# Patient Record
Sex: MALE | Race: BLACK OR AFRICAN AMERICAN | NOT HISPANIC OR LATINO | Employment: UNEMPLOYED | URBAN - METROPOLITAN AREA
[De-identification: names, ages, dates, MRNs, and addresses within clinical notes are randomized per-mention and may not be internally consistent; named-entity substitution may affect disease eponyms.]

---

## 2017-04-12 ENCOUNTER — OFFICE VISIT (OUTPATIENT)
Dept: INTERNAL MEDICINE | Facility: CLINIC | Age: 32
End: 2017-04-12
Payer: COMMERCIAL

## 2017-04-12 VITALS
RESPIRATION RATE: 16 BRPM | SYSTOLIC BLOOD PRESSURE: 118 MMHG | DIASTOLIC BLOOD PRESSURE: 84 MMHG | BODY MASS INDEX: 27.71 KG/M2 | TEMPERATURE: 99 F | HEART RATE: 86 BPM | HEIGHT: 67 IN | WEIGHT: 176.56 LBS

## 2017-04-12 DIAGNOSIS — K52.9 AGE (ACUTE GASTROENTERITIS): Primary | ICD-10-CM

## 2017-04-12 PROCEDURE — 99999 PR PBB SHADOW E&M-NEW PATIENT-LVL III: CPT | Mod: PBBFAC,,, | Performed by: FAMILY MEDICINE

## 2017-04-12 PROCEDURE — 99203 OFFICE O/P NEW LOW 30 MIN: CPT | Mod: S$GLB,,, | Performed by: FAMILY MEDICINE

## 2017-04-12 PROCEDURE — 1160F RVW MEDS BY RX/DR IN RCRD: CPT | Mod: S$GLB,,, | Performed by: FAMILY MEDICINE

## 2017-04-12 RX ORDER — ONDANSETRON 8 MG/1
8 TABLET, ORALLY DISINTEGRATING ORAL EVERY 6 HOURS PRN
Qty: 30 TABLET | Refills: 0 | Status: SHIPPED | OUTPATIENT
Start: 2017-04-12 | End: 2017-08-30

## 2017-04-12 NOTE — PROGRESS NOTES
Subjective:   Patient ID: Varinder Salazar is a 31 y.o. male.    Chief Complaint: Generalized Body Aches; Chills; Emesis (vomiting on Monday); and Letter for School/Work (return to work note)      Emesis    This is a new problem. The current episode started yesterday. The problem occurs 2 to 4 times per day. The problem has been gradually improving. The emesis has an appearance of stomach contents. There has been no fever. Associated symptoms include abdominal pain, chills, diarrhea, a fever, headaches, myalgias and sweats. Pertinent negatives include no arthralgias, chest pain, coughing, dizziness, URI or weight loss. Risk factors include ill contacts. He has tried nothing for the symptoms. The treatment provided no relief.         PAST MEDICAL HISTORY:  Past Medical History:   Diagnosis Date    Asthma     Depression        PAST SURGICAL HISTORY:  History reviewed. No pertinent surgical history.    SOCIAL HISTORY:  Social History     Social History    Marital status: Single     Spouse name: N/A    Number of children: N/A    Years of education: N/A     Occupational History    Not on file.     Social History Main Topics    Smoking status: Former Smoker     Quit date: 4/5/2017    Smokeless tobacco: Not on file    Alcohol use Yes      Comment: social     Drug use: Yes     Special: Marijuana    Sexual activity: Yes     Partners: Female     Other Topics Concern    Not on file     Social History Narrative       FAMILY HISTORY:  Family History   Problem Relation Age of Onset    Migraines Mother     Diabetes Father     Heart disease Father        ALLERGIES AND MEDICATIONS: updated and reviewed.  Review of patient's allergies indicates:  No Known Allergies    Current Outpatient Prescriptions:     ondansetron (ZOFRAN-ODT) 8 MG TbDL, Take 1 tablet (8 mg total) by mouth every 6 (six) hours as needed., Disp: 30 tablet, Rfl: 0    Review of Systems   Constitutional: Positive for chills and fever. Negative for  "weight loss.   Respiratory: Negative for cough and wheezing.    Cardiovascular: Negative for chest pain and palpitations.   Gastrointestinal: Positive for abdominal pain, diarrhea, nausea and vomiting. Negative for abdominal distention, anal bleeding, blood in stool, constipation and rectal pain.   Musculoskeletal: Positive for myalgias. Negative for arthralgias.   Neurological: Positive for headaches. Negative for dizziness.       Objective:     Vitals:    04/12/17 1049   BP: 118/84   Pulse: 86   Resp: 16   Temp: 98.6 °F (37 °C)   TempSrc: Oral   Weight: 80.1 kg (176 lb 9.4 oz)   Height: 5' 7" (1.702 m)   PainSc:   6   PainLoc: Generalized     Body mass index is 27.66 kg/(m^2).    Physical Exam   Constitutional: He appears well-developed and well-nourished.   HENT:   Head: Normocephalic and atraumatic.   Right Ear: Hearing, tympanic membrane, external ear and ear canal normal. No drainage or swelling. No decreased hearing is noted.   Left Ear: Hearing, tympanic membrane, external ear and ear canal normal. No drainage or swelling. No decreased hearing is noted.   Nose: Nose normal. No rhinorrhea.   Mouth/Throat: Oropharynx is clear and moist. No oropharyngeal exudate, posterior oropharyngeal edema or posterior oropharyngeal erythema.   Eyes: Conjunctivae, EOM and lids are normal. Pupils are equal, round, and reactive to light. Right eye exhibits no discharge and no exudate. Left eye exhibits no discharge and no exudate. Right conjunctiva is not injected. Left conjunctiva is not injected.   Neck: Trachea normal and full passive range of motion without pain. Normal carotid pulses, no hepatojugular reflux and no JVD present. Carotid bruit is not present. No rigidity. No edema and no erythema present. No thyroid mass and no thyromegaly present.   Cardiovascular: Normal rate, regular rhythm and normal heart sounds.    Pulmonary/Chest: Effort normal. No respiratory distress.   Abdominal: Soft. Normal appearance and bowel " sounds are normal. There is no tenderness. There is negative Stephen's sign.   Lymphadenopathy:     He has no cervical adenopathy.   Neurological: He is alert.   Skin: Skin is warm and dry.   Psychiatric: He has a normal mood and affect. His speech is normal and behavior is normal.       Assessment and Plan:   Varinder was seen today for generalized body aches, chills, emesis and letter for school/work.    Diagnoses and all orders for this visit:    AGE (acute gastroenteritis)    Other orders  -     ondansetron (ZOFRAN-ODT) 8 MG TbDL; Take 1 tablet (8 mg total) by mouth every 6 (six) hours as needed.    Hydrate, rest, push po liquids. No immodium for 48-72 hrs. ER if not better tonight.    Return in about 1 week (around 4/19/2017).    Pt has been given instructions populated from [employer] patient information database and has verbalized understanding of the after-visit summary (AVS) and information contained therein.    THIS NOTE WILL BE SHARED WITH THE PATIENT.

## 2017-04-13 NOTE — PATIENT INSTRUCTIONS

## 2017-08-30 ENCOUNTER — OFFICE VISIT (OUTPATIENT)
Dept: INTERNAL MEDICINE | Facility: CLINIC | Age: 32
End: 2017-08-30
Payer: COMMERCIAL

## 2017-08-30 VITALS
HEART RATE: 76 BPM | RESPIRATION RATE: 16 BRPM | TEMPERATURE: 99 F | DIASTOLIC BLOOD PRESSURE: 86 MMHG | SYSTOLIC BLOOD PRESSURE: 132 MMHG | HEIGHT: 67 IN | WEIGHT: 185.88 LBS | BODY MASS INDEX: 29.17 KG/M2

## 2017-08-30 DIAGNOSIS — B96.89 ACUTE BACTERIAL PHARYNGITIS: ICD-10-CM

## 2017-08-30 DIAGNOSIS — N45.1 EPIDIDYMITIS: Primary | ICD-10-CM

## 2017-08-30 DIAGNOSIS — J02.8 ACUTE BACTERIAL PHARYNGITIS: ICD-10-CM

## 2017-08-30 PROCEDURE — 3008F BODY MASS INDEX DOCD: CPT | Mod: S$GLB,,, | Performed by: FAMILY MEDICINE

## 2017-08-30 PROCEDURE — 99999 PR PBB SHADOW E&M-EST. PATIENT-LVL III: CPT | Mod: PBBFAC,,, | Performed by: FAMILY MEDICINE

## 2017-08-30 PROCEDURE — 99214 OFFICE O/P EST MOD 30 MIN: CPT | Mod: S$GLB,,, | Performed by: FAMILY MEDICINE

## 2017-08-30 RX ORDER — METHYLPREDNISOLONE 4 MG/1
TABLET ORAL
Qty: 1 PACKAGE | Refills: 0 | Status: SHIPPED | OUTPATIENT
Start: 2017-08-30 | End: 2017-09-19

## 2017-08-30 RX ORDER — CIPROFLOXACIN 500 MG/1
500 TABLET ORAL EVERY 12 HOURS
Qty: 20 TABLET | Refills: 0 | Status: SHIPPED | OUTPATIENT
Start: 2017-08-30 | End: 2017-09-19

## 2017-08-30 NOTE — PROGRESS NOTES
Subjective:   Patient ID: Varinder Salazar is a 31 y.o. male.    Chief Complaint: Sore Throat and Back Pain      Sore Throat    This is a new problem. The current episode started 1 to 4 weeks ago. The problem has been rapidly worsening. Neither side of throat is experiencing more pain than the other. The maximum temperature recorded prior to his arrival was 100.4 - 100.9 F. The fever has been present for 1 to 2 days. The pain is at a severity of 9/10. Associated symptoms include ear pain, headaches, a hoarse voice, a plugged ear sensation, neck pain, swollen glands and trouble swallowing. Pertinent negatives include no abdominal pain, congestion, coughing, diarrhea, drooling, ear discharge, shortness of breath, stridor or vomiting. He has had exposure to strep. He has had no exposure to mono. He has tried nothing for the symptoms. The treatment provided no relief.   He is also having some symptoms cw past epididymitis but denies dysuria or fever or chills. Having some left sided lower back pain as he did with prior episode.    Patient queried and denies any further complaints        PAST MEDICAL HISTORY:  Past Medical History:   Diagnosis Date    Asthma     Depression        PAST SURGICAL HISTORY:  History reviewed. No pertinent surgical history.    SOCIAL HISTORY:  Social History     Social History    Marital status: Single     Spouse name: N/A    Number of children: N/A    Years of education: N/A     Occupational History    Not on file.     Social History Main Topics    Smoking status: Former Smoker     Quit date: 4/5/2017    Smokeless tobacco: Not on file    Alcohol use Yes      Comment: social     Drug use:      Types: Marijuana    Sexual activity: Yes     Partners: Female     Other Topics Concern    Not on file     Social History Narrative    No narrative on file       FAMILY HISTORY:  Family History   Problem Relation Age of Onset    Migraines Mother     Diabetes Father     Heart disease Father  "       ALLERGIES AND MEDICATIONS: updated and reviewed.  Review of patient's allergies indicates:  No Known Allergies    Current Outpatient Prescriptions:     ciprofloxacin HCl (CIPRO) 500 MG tablet, Take 1 tablet (500 mg total) by mouth every 12 (twelve) hours., Disp: 20 tablet, Rfl: 0    methylPREDNISolone (MEDROL DOSEPACK) 4 mg tablet, use as directed, Disp: 1 Package, Rfl: 0    Review of Systems   Constitutional: Positive for fatigue. Negative for chills, diaphoresis and fever.   HENT: Positive for ear pain, hoarse voice, sore throat and trouble swallowing. Negative for congestion, drooling and ear discharge.    Respiratory: Negative for cough, shortness of breath and stridor.    Cardiovascular: Negative for chest pain and palpitations.   Gastrointestinal: Negative for abdominal pain, diarrhea and vomiting.   Genitourinary: Positive for frequency. Negative for decreased urine volume, dysuria, flank pain, penile pain, penile swelling, testicular pain and urgency.   Musculoskeletal: Positive for back pain and neck pain.   Neurological: Positive for headaches.       Objective:     Vitals:    08/30/17 0836   BP: 132/86   Pulse: 76   Resp: 16   Temp: 98.9 °F (37.2 °C)   TempSrc: Oral   Weight: 84.3 kg (185 lb 13.6 oz)   Height: 5' 7" (1.702 m)   PainSc:   7     Body mass index is 29.11 kg/m².    Physical Exam   Constitutional: He is oriented to person, place, and time. He appears well-developed and well-nourished. He is cooperative. He does not have a sickly appearance. No distress.   HENT:   Head: Normocephalic and atraumatic.   Right Ear: Hearing, tympanic membrane, external ear and ear canal normal. No drainage, swelling or tenderness. No decreased hearing is noted.   Left Ear: Hearing, tympanic membrane, external ear and ear canal normal. No drainage, swelling or tenderness. No decreased hearing is noted.   Nose: Nose normal. No rhinorrhea.   Mouth/Throat: Oropharyngeal exudate, posterior oropharyngeal edema " and posterior oropharyngeal erythema present.   Eyes: Conjunctivae, EOM and lids are normal. Pupils are equal, round, and reactive to light. Right eye exhibits no discharge and no exudate. Left eye exhibits no discharge and no exudate. Right conjunctiva is not injected. Left conjunctiva is not injected. No scleral icterus. Right eye exhibits normal extraocular motion. Left eye exhibits normal extraocular motion.   Neck: Trachea normal, normal range of motion and full passive range of motion without pain. Neck supple. Normal carotid pulses, no hepatojugular reflux and no JVD present. Carotid bruit is not present. No neck rigidity. No tracheal deviation, no edema and no erythema present. No thyroid mass and no thyromegaly present.   Cardiovascular: Normal rate, regular rhythm, normal heart sounds and normal pulses.  Exam reveals no friction rub.    No murmur heard.  Pulmonary/Chest: Effort normal and breath sounds normal. No accessory muscle usage. No respiratory distress. He has no wheezes. He has no rhonchi. He has no rales.   Abdominal: Soft. Normal appearance and bowel sounds are normal. He exhibits no distension, no abdominal bruit, no pulsatile midline mass and no mass. There is no hepatosplenomegaly. There is no tenderness. There is no rebound, no guarding, no CVA tenderness, no tenderness at McBurney's point and negative Stephen's sign.   Musculoskeletal: He exhibits no edema.   Lymphadenopathy:        Head (right side): No submandibular, no preauricular and no posterior auricular adenopathy present.        Head (left side): No submandibular, no preauricular and no posterior auricular adenopathy present.     He has no cervical adenopathy.   Neurological: He is alert and oriented to person, place, and time. GCS eye subscore is 4. GCS verbal subscore is 5. GCS motor subscore is 6.   Skin: Skin is warm and dry. No ecchymosis and no rash noted. Rash is not maculopapular and not urticarial. He is not diaphoretic. No  cyanosis or erythema. Nails show no clubbing.   Psychiatric: He has a normal mood and affect. His speech is normal and behavior is normal. Thought content normal. His mood appears not anxious. His affect is not angry and not inappropriate. He does not exhibit a depressed mood.       Assessment and Plan:   Varinder was seen today for sore throat and back pain.    Diagnoses and all orders for this visit:    Epididymitis    Acute bacterial pharyngitis    Other orders  -     methylPREDNISolone (MEDROL DOSEPACK) 4 mg tablet; use as directed  -     ciprofloxacin HCl (CIPRO) 500 MG tablet; Take 1 tablet (500 mg total) by mouth every 12 (twelve) hours.      Hydrate, rest, OTC Mucinex as directed, Nasal saline as needed.  OTC Zyrtec as directed.    Return in about 1 week (around 9/6/2017), or if symptoms worsen or fail to improve.        THIS NOTE WILL BE SHARED WITH THE PATIENT.

## 2017-09-19 ENCOUNTER — OFFICE VISIT (OUTPATIENT)
Dept: INTERNAL MEDICINE | Facility: CLINIC | Age: 32
End: 2017-09-19
Payer: COMMERCIAL

## 2017-09-19 VITALS
HEIGHT: 67 IN | DIASTOLIC BLOOD PRESSURE: 81 MMHG | BODY MASS INDEX: 28.93 KG/M2 | TEMPERATURE: 99 F | SYSTOLIC BLOOD PRESSURE: 118 MMHG | HEART RATE: 75 BPM | RESPIRATION RATE: 16 BRPM | WEIGHT: 184.31 LBS

## 2017-09-19 DIAGNOSIS — K52.9 AGE (ACUTE GASTROENTERITIS): Primary | ICD-10-CM

## 2017-09-19 PROCEDURE — 99213 OFFICE O/P EST LOW 20 MIN: CPT | Mod: S$GLB,,, | Performed by: FAMILY MEDICINE

## 2017-09-19 PROCEDURE — 3008F BODY MASS INDEX DOCD: CPT | Mod: S$GLB,,, | Performed by: FAMILY MEDICINE

## 2017-09-19 PROCEDURE — 99999 PR PBB SHADOW E&M-EST. PATIENT-LVL III: CPT | Mod: PBBFAC,,, | Performed by: FAMILY MEDICINE

## 2017-09-19 RX ORDER — ONDANSETRON 8 MG/1
8 TABLET, ORALLY DISINTEGRATING ORAL EVERY 6 HOURS PRN
Qty: 30 TABLET | Refills: 0 | Status: SHIPPED | OUTPATIENT
Start: 2017-09-19 | End: 2020-01-27

## 2017-09-20 NOTE — PROGRESS NOTES
"Subjective:   Patient ID: Varinder Salazar is a 31 y.o. male.    Chief Complaint: Sore Throat and Generalized Body Aches      HPI  32 yo male with NVD for two days. Nausea is improving. Diarrhea is improving. No hematemesis. No melena or BRBPR. No fevers or chills. Mild sore throat. Hydrating well now.    Patient queried and denies any further complaints.        ALLERGIES AND MEDICATIONS: updated and reviewed.  Review of patient's allergies indicates:  No Known Allergies    Current Outpatient Prescriptions:     ondansetron (ZOFRAN-ODT) 8 MG TbDL, Take 1 tablet (8 mg total) by mouth every 6 (six) hours as needed., Disp: 30 tablet, Rfl: 0    Review of Systems   Constitutional: Negative for activity change, appetite change, chills, diaphoresis, fatigue, fever and unexpected weight change.   HENT: Positive for sore throat. Negative for congestion, ear discharge, ear pain, postnasal drip, rhinorrhea and sneezing.    Eyes: Negative for photophobia and discharge.   Respiratory: Negative for cough, chest tightness, shortness of breath and wheezing.    Cardiovascular: Negative for chest pain and palpitations.   Gastrointestinal: Positive for diarrhea, nausea and vomiting. Negative for abdominal distention and abdominal pain.   Genitourinary: Negative for dysuria.   Musculoskeletal: Negative for arthralgias and neck pain.   Skin: Negative for rash.   Neurological: Negative for headaches.       Objective:     Vitals:    09/19/17 1135   BP: 118/81   Pulse: 75   Resp: 16   Temp: 98.7 °F (37.1 °C)   TempSrc: Oral   Weight: 83.6 kg (184 lb 4.9 oz)   Height: 5' 7" (1.702 m)   PainSc:   7   PainLoc: Throat     Body mass index is 28.87 kg/m².    Physical Exam   Constitutional: He appears well-developed and well-nourished.   HENT:   Head: Normocephalic and atraumatic.   Right Ear: Hearing, tympanic membrane, external ear and ear canal normal. No drainage or swelling. No decreased hearing is noted.   Left Ear: Hearing, tympanic " membrane, external ear and ear canal normal. No drainage or swelling. No decreased hearing is noted.   Nose: Nose normal. No rhinorrhea.   Mouth/Throat: Posterior oropharyngeal erythema present. No oropharyngeal exudate, posterior oropharyngeal edema or tonsillar abscesses.   Eyes: Conjunctivae, EOM and lids are normal. Pupils are equal, round, and reactive to light. Right eye exhibits no discharge and no exudate. Left eye exhibits no discharge and no exudate. Right conjunctiva is not injected. Left conjunctiva is not injected.   Neck: Trachea normal and full passive range of motion without pain. Normal carotid pulses, no hepatojugular reflux and no JVD present. Carotid bruit is not present. No neck rigidity. No edema and no erythema present. No thyroid mass and no thyromegaly present.   Cardiovascular: Normal rate, regular rhythm and normal heart sounds.    Pulmonary/Chest: Effort normal. No respiratory distress.   Abdominal: Soft. Normal appearance and bowel sounds are normal. There is no tenderness. There is negative Stephen's sign.   Lymphadenopathy:     He has no cervical adenopathy.   Neurological: He is alert.   Skin: Skin is warm and dry.   Psychiatric: He has a normal mood and affect. His speech is normal and behavior is normal.       Assessment and Plan:   Varinder was seen today for sore throat and generalized body aches.    Diagnoses and all orders for this visit:    AGE (acute gastroenteritis)    Other orders  -     ondansetron (ZOFRAN-ODT) 8 MG TbDL; Take 1 tablet (8 mg total) by mouth every 6 (six) hours as needed.      Gastroenteritis--Hydrate with clear liquids. (Dilute Powerade, Gatorade, water, flat Sprite). No carbonation (increases nausea). When hungry, slowly advance diet to full liquids and then to cold, bland diet. Zofran Rx as directed. If emesis starts and can't be resolved with Zofran, go to ER.   If diarrhea starts, I recommend good, soft toilet paper or baby wipes. Consider using Desitin or  Erik's Butt Paste. Wash hands thoroughly. I don't especially recommend Imodium or Pepto-Bismol; let the diarrhea take its course. You will not become dehydrated as long as we stop the vomiting.     No Follow-up on file.    THIS NOTE WILL BE SHARED WITH THE PATIENT.

## 2017-09-21 ENCOUNTER — TELEPHONE (OUTPATIENT)
Dept: INTERNAL MEDICINE | Facility: CLINIC | Age: 32
End: 2017-09-21

## 2017-09-21 NOTE — TELEPHONE ENCOUNTER
----- Message from Amanda Hodge sent at 9/21/2017  8:09 AM CDT -----  Contact: [phone]  Patient would like to know if he can get a new Dr note to return to work. Patient was told to call if needed he tried to return to work but was sent home.

## 2017-09-21 NOTE — TELEPHONE ENCOUNTER
Pt was seen Tuesday, missed Wednesday  and  Thursday and wants to return tomorrow Friday 9-22    Pt informed new wk note is ready for him to .

## 2017-12-13 ENCOUNTER — OFFICE VISIT (OUTPATIENT)
Dept: INTERNAL MEDICINE | Facility: CLINIC | Age: 32
End: 2017-12-13
Payer: COMMERCIAL

## 2017-12-13 VITALS
WEIGHT: 183.44 LBS | DIASTOLIC BLOOD PRESSURE: 80 MMHG | BODY MASS INDEX: 28.79 KG/M2 | HEART RATE: 68 BPM | SYSTOLIC BLOOD PRESSURE: 130 MMHG | TEMPERATURE: 98 F | HEIGHT: 67 IN

## 2017-12-13 DIAGNOSIS — J11.1 INFLUENZA: Primary | ICD-10-CM

## 2017-12-13 PROCEDURE — 99999 PR PBB SHADOW E&M-EST. PATIENT-LVL III: CPT | Mod: PBBFAC,,, | Performed by: FAMILY MEDICINE

## 2017-12-13 PROCEDURE — 99213 OFFICE O/P EST LOW 20 MIN: CPT | Mod: S$GLB,,, | Performed by: FAMILY MEDICINE

## 2017-12-13 RX ORDER — PROMETHAZINE HYDROCHLORIDE AND CODEINE PHOSPHATE 6.25; 1 MG/5ML; MG/5ML
5 SOLUTION ORAL EVERY 4 HOURS PRN
Qty: 240 ML | Refills: 0 | Status: SHIPPED | OUTPATIENT
Start: 2017-12-13 | End: 2017-12-23

## 2017-12-13 RX ORDER — OSELTAMIVIR PHOSPHATE 75 MG/1
75 CAPSULE ORAL 2 TIMES DAILY
Qty: 10 CAPSULE | Refills: 0 | Status: SHIPPED | OUTPATIENT
Start: 2017-12-13 | End: 2017-12-18

## 2017-12-13 NOTE — PROGRESS NOTES
Subjective:   Patient ID: Varinder Salazar is a 32 y.o. male.    Chief Complaint: Sore Throat; Cough (dry); Nasal Congestion; and Generalized Body Aches      Cough   This is a new problem. The current episode started yesterday. The problem has been rapidly worsening. The problem occurs every few minutes. The cough is non-productive. Associated symptoms include chest pain, chills, ear congestion, ear pain, a fever, headaches, myalgias, postnasal drip, rhinorrhea, a sore throat and sweats. Pertinent negatives include no hemoptysis, rash, shortness of breath, weight loss or wheezing. The symptoms are aggravated by dust, exercise and fumes. He has tried nothing for the symptoms. The treatment provided no relief. There is no history of asthma or bronchiectasis.       Patient queried and denies any further complaints.        ALLERGIES AND MEDICATIONS: updated and reviewed.  Review of patient's allergies indicates:  No Known Allergies    Current Outpatient Prescriptions:     ondansetron (ZOFRAN-ODT) 8 MG TbDL, Take 1 tablet (8 mg total) by mouth every 6 (six) hours as needed., Disp: 30 tablet, Rfl: 0    oseltamivir (TAMIFLU) 75 MG capsule, Take 1 capsule (75 mg total) by mouth 2 (two) times daily., Disp: 10 capsule, Rfl: 0    promethazine-codeine 6.25-10 mg/5 ml (PHENERGAN WITH CODEINE) 6.25-10 mg/5 mL syrup, Take 5 mLs by mouth every 4 (four) hours as needed., Disp: 240 mL, Rfl: 0    Review of Systems   Constitutional: Positive for chills and fever. Negative for weight loss.   HENT: Positive for ear pain, postnasal drip, rhinorrhea and sore throat.    Respiratory: Positive for cough. Negative for hemoptysis, shortness of breath and wheezing.    Cardiovascular: Positive for chest pain. Negative for palpitations.   Musculoskeletal: Positive for myalgias.   Skin: Negative for rash.   Neurological: Positive for headaches.       Objective:     Vitals:    12/13/17 1058   BP: 130/80   Pulse: 68   Temp: 98.3 °F (36.8 °C)  "  TempSrc: Oral   Weight: 83.2 kg (183 lb 6.8 oz)   Height: 5' 7" (1.702 m)   PainSc:   4     Body mass index is 28.73 kg/m².    Physical Exam   Constitutional: He appears well-developed and well-nourished.   HENT:   Head: Normocephalic and atraumatic.   Right Ear: Hearing, tympanic membrane, external ear and ear canal normal. No drainage or swelling. No decreased hearing is noted.   Left Ear: Hearing, tympanic membrane, external ear and ear canal normal. No drainage or swelling. No decreased hearing is noted.   Nose: Nose normal. No rhinorrhea.   Mouth/Throat: Oropharynx is clear and moist. No oropharyngeal exudate, posterior oropharyngeal edema or posterior oropharyngeal erythema.   Eyes: Conjunctivae, EOM and lids are normal. Pupils are equal, round, and reactive to light. Right eye exhibits no discharge and no exudate. Left eye exhibits no discharge and no exudate. Right conjunctiva is not injected. Left conjunctiva is not injected.   Neck: Trachea normal and full passive range of motion without pain. Normal carotid pulses, no hepatojugular reflux and no JVD present. Carotid bruit is not present. No neck rigidity. No edema and no erythema present. No thyroid mass and no thyromegaly present.   Cardiovascular: Normal rate, regular rhythm and normal heart sounds.    Pulmonary/Chest: Effort normal. No respiratory distress.   Abdominal: Soft. Normal appearance and bowel sounds are normal. There is no tenderness. There is negative Stephen's sign.   Lymphadenopathy:     He has no cervical adenopathy.   Neurological: He is alert.   Skin: Skin is warm and dry.   Psychiatric: He has a normal mood and affect. His speech is normal and behavior is normal.       Assessment and Plan:   Varinder was seen today for sore throat, cough, nasal congestion and generalized body aches.    Diagnoses and all orders for this visit:    Influenza    Other orders  -     oseltamivir (TAMIFLU) 75 MG capsule; Take 1 capsule (75 mg total) by mouth 2 " (two) times daily.  -     promethazine-codeine 6.25-10 mg/5 ml (PHENERGAN WITH CODEINE) 6.25-10 mg/5 mL syrup; Take 5 mLs by mouth every 4 (four) hours as needed.    Hydrate, rest, OTC Mucinex as directed, Nasal saline as needed.  OTC Zyrtec as directed.      Return in about 1 week (around 12/20/2017).    THIS NOTE WILL BE SHARED WITH THE PATIENT.

## 2018-02-01 ENCOUNTER — TELEPHONE (OUTPATIENT)
Dept: INTERNAL MEDICINE | Facility: CLINIC | Age: 33
End: 2018-02-01

## 2018-02-01 ENCOUNTER — OFFICE VISIT (OUTPATIENT)
Dept: INTERNAL MEDICINE | Facility: CLINIC | Age: 33
End: 2018-02-01
Payer: COMMERCIAL

## 2018-02-01 ENCOUNTER — HOSPITAL ENCOUNTER (OUTPATIENT)
Dept: RADIOLOGY | Facility: HOSPITAL | Age: 33
Discharge: HOME | End: 2018-02-01
Attending: FAMILY MEDICINE
Payer: COMMERCIAL

## 2018-02-01 VITALS
TEMPERATURE: 98 F | DIASTOLIC BLOOD PRESSURE: 90 MMHG | BODY MASS INDEX: 28.09 KG/M2 | OXYGEN SATURATION: 99 % | SYSTOLIC BLOOD PRESSURE: 140 MMHG | HEART RATE: 89 BPM | WEIGHT: 179 LBS | HEIGHT: 67 IN

## 2018-02-01 DIAGNOSIS — M54.6 ACUTE LEFT-SIDED THORACIC BACK PAIN: ICD-10-CM
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
## 2018-02-01 DIAGNOSIS — M54.50 ACUTE LEFT-SIDED LOW BACK PAIN WITHOUT SCIATICA: ICD-10-CM

## 2018-02-01 DIAGNOSIS — M25.512 ACUTE PAIN OF LEFT SHOULDER: Primary | ICD-10-CM

## 2018-02-01 DIAGNOSIS — M25.512 ACUTE PAIN OF LEFT SHOULDER: ICD-10-CM

## 2018-02-01 PROCEDURE — 72114 X-RAY EXAM L-S SPINE BENDING: CPT | Mod: TC,PO

## 2018-02-01 PROCEDURE — 73030 X-RAY EXAM OF SHOULDER: CPT | Mod: 26,LT,, | Performed by: RADIOLOGY

## 2018-02-01 PROCEDURE — 99214 OFFICE O/P EST MOD 30 MIN: CPT | Mod: S$GLB,,, | Performed by: FAMILY MEDICINE

## 2018-02-01 PROCEDURE — 72114 X-RAY EXAM L-S SPINE BENDING: CPT | Mod: 26,,, | Performed by: RADIOLOGY

## 2018-02-01 PROCEDURE — 73030 X-RAY EXAM OF SHOULDER: CPT | Mod: TC,PO,LT

## 2018-02-01 PROCEDURE — 3008F BODY MASS INDEX DOCD: CPT | Mod: S$GLB,,, | Performed by: FAMILY MEDICINE

## 2018-02-01 PROCEDURE — 99999 PR PBB SHADOW E&M-EST. PATIENT-LVL III: CPT | Mod: PBBFAC,,, | Performed by: FAMILY MEDICINE

## 2018-02-01 PROCEDURE — 72070 X-RAY EXAM THORAC SPINE 2VWS: CPT | Mod: TC,PO

## 2018-02-01 PROCEDURE — 72070 X-RAY EXAM THORAC SPINE 2VWS: CPT | Mod: 26,,, | Performed by: RADIOLOGY

## 2018-02-01 RX ORDER — CYCLOBENZAPRINE HCL 10 MG
10 TABLET ORAL NIGHTLY
Qty: 20 TABLET | Refills: 0 | Status: SHIPPED | OUTPATIENT
Start: 2018-02-01 | End: 2018-02-11

## 2018-02-01 RX ORDER — CHLORZOXAZONE 500 MG/1
TABLET ORAL
COMMUNITY
Start: 2018-01-23 | End: 2018-02-01

## 2018-02-01 RX ORDER — METHOCARBAMOL 500 MG/1
500 TABLET, FILM COATED ORAL 3 TIMES DAILY
Qty: 30 TABLET | Refills: 0 | Status: SHIPPED | OUTPATIENT
Start: 2018-02-01 | End: 2018-02-11

## 2018-02-01 RX ORDER — IBUPROFEN 800 MG/1
800 TABLET ORAL 3 TIMES DAILY
Qty: 30 TABLET | Refills: 0 | Status: SHIPPED | OUTPATIENT
Start: 2018-02-01 | End: 2020-01-27
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
## 2018-02-01 NOTE — TELEPHONE ENCOUNTER
----- Message from Jeremi Ruiz MD sent at 2/1/2018 12:37 PM CST -----  Please let pt know his spinal films and shoulder films were normal other than some mild arthritis changes. Thank you.

## 2019-07-23 ENCOUNTER — TELEPHONE (OUTPATIENT)
Dept: PRIMARY CARE CLINIC | Facility: CLINIC | Age: 34
End: 2019-07-23

## 2019-07-23 ENCOUNTER — OFFICE VISIT (OUTPATIENT)
Dept: PRIMARY CARE CLINIC | Facility: CLINIC | Age: 34
End: 2019-07-23
Payer: COMMERCIAL

## 2019-07-23 ENCOUNTER — HOSPITAL ENCOUNTER (OUTPATIENT)
Dept: RADIOLOGY | Facility: HOSPITAL | Age: 34
Discharge: HOME | End: 2019-07-23
Attending: FAMILY MEDICINE
Payer: COMMERCIAL

## 2019-07-23 VITALS
BODY MASS INDEX: 30.07 KG/M2 | DIASTOLIC BLOOD PRESSURE: 78 MMHG | HEIGHT: 67 IN | HEART RATE: 88 BPM | TEMPERATURE: 99 F | WEIGHT: 191.56 LBS | SYSTOLIC BLOOD PRESSURE: 104 MMHG

## 2019-07-23 DIAGNOSIS — M54.42 CHRONIC LEFT-SIDED LOW BACK PAIN WITH LEFT-SIDED SCIATICA: ICD-10-CM

## 2019-07-23 DIAGNOSIS — G89.29 CHRONIC LEFT-SIDED LOW BACK PAIN WITH LEFT-SIDED SCIATICA: ICD-10-CM

## 2019-07-23 DIAGNOSIS — N52.8 OTHER MALE ERECTILE DYSFUNCTION: ICD-10-CM

## 2019-07-23 DIAGNOSIS — L02.811 SCALP ABSCESS: Primary | ICD-10-CM

## 2019-07-23 DIAGNOSIS — Z00.00 GENERAL MEDICAL EXAM: ICD-10-CM

## 2019-07-23 PROCEDURE — 99999 PR PBB SHADOW E&M-EST. PATIENT-LVL III: ICD-10-PCS | Mod: PBBFAC,,, | Performed by: FAMILY MEDICINE

## 2019-07-23 PROCEDURE — 99214 PR OFFICE/OUTPT VISIT, EST, LEVL IV, 30-39 MIN: ICD-10-PCS | Mod: S$GLB,,, | Performed by: FAMILY MEDICINE

## 2019-07-23 PROCEDURE — 99213 OFFICE O/P EST LOW 20 MIN: CPT | Mod: PBBFAC,25,PN | Performed by: FAMILY MEDICINE

## 2019-07-23 PROCEDURE — 72100 X-RAY EXAM L-S SPINE 2/3 VWS: CPT | Mod: 26,,, | Performed by: RADIOLOGY

## 2019-07-23 PROCEDURE — 99999 PR PBB SHADOW E&M-EST. PATIENT-LVL III: CPT | Mod: PBBFAC,,, | Performed by: FAMILY MEDICINE

## 2019-07-23 PROCEDURE — 99214 OFFICE O/P EST MOD 30 MIN: CPT | Mod: S$GLB,,, | Performed by: FAMILY MEDICINE

## 2019-07-23 PROCEDURE — 72100 XR LUMBAR SPINE AP AND LATERAL: ICD-10-PCS | Mod: 26,,, | Performed by: RADIOLOGY

## 2019-07-23 PROCEDURE — 72100 X-RAY EXAM L-S SPINE 2/3 VWS: CPT | Mod: TC,PN

## 2019-07-23 RX ORDER — DICLOFENAC SODIUM 75 MG/1
75 TABLET, DELAYED RELEASE ORAL 2 TIMES DAILY
Qty: 30 TABLET | Refills: 1 | Status: SHIPPED | OUTPATIENT
Start: 2019-07-23 | End: 2020-01-27

## 2019-07-23 RX ORDER — SILDENAFIL 100 MG/1
100 TABLET, FILM COATED ORAL DAILY PRN
Qty: 30 TABLET | Refills: 1 | Status: SHIPPED | OUTPATIENT
Start: 2019-07-23 | End: 2020-01-27

## 2019-07-23 RX ORDER — METHOCARBAMOL 500 MG/1
500 TABLET, FILM COATED ORAL 3 TIMES DAILY
Qty: 30 TABLET | Refills: 0 | Status: SHIPPED | OUTPATIENT
Start: 2019-07-23 | End: 2019-08-02

## 2019-07-24 ENCOUNTER — TELEPHONE (OUTPATIENT)
Dept: PLASTIC SURGERY | Facility: CLINIC | Age: 34
End: 2019-07-24

## 2019-07-24 PROBLEM — G89.29 CHRONIC LEFT-SIDED LOW BACK PAIN WITH LEFT-SIDED SCIATICA: Status: ACTIVE | Noted: 2019-07-24

## 2019-07-24 PROBLEM — N52.8 OTHER MALE ERECTILE DYSFUNCTION: Status: ACTIVE | Noted: 2019-07-24

## 2019-07-24 PROBLEM — M54.42 CHRONIC LEFT-SIDED LOW BACK PAIN WITH LEFT-SIDED SCIATICA: Status: ACTIVE | Noted: 2019-07-24

## 2019-07-24 NOTE — TELEPHONE ENCOUNTER
left message for pt to call me back regarding being scheduled for a consult appt. I left our office number for pt to call back . Pt didnt answer the phone,         ----- Message from Imelda Bhatia RN sent at 7/23/2019  5:32 PM CDT -----  Contact: [phone]/ pt information      ----- Message -----  From: Torrie Miller  Sent: 7/23/2019  11:04 AM  To: Daljit Purcell Staff    Dr. Jeremi Ruiz has put in a referral for patient to be scheduled with Plastic Surgery. Please call patient to schedule.    Scalp abscess [L02.811]

## 2019-07-24 NOTE — TELEPHONE ENCOUNTER
spoke with pt and was able to get him scheduled . Pt verbalized understanding of appt time date and location.             ----- Message from Machelle Vargas sent at 7/24/2019 12:02 PM CDT -----  Contact: Pt.Self   Patient Returning Call from [facility]    Who Left Message for Patient:  Ms.Brittani Al     Communication Preference:  [phone]    Additional Information:    Thank You
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
## 2019-07-26 ENCOUNTER — TELEPHONE (OUTPATIENT)
Dept: PRIMARY CARE CLINIC | Facility: CLINIC | Age: 34
End: 2019-07-26

## 2019-07-26 ENCOUNTER — TELEPHONE (OUTPATIENT)
Dept: PLASTIC SURGERY | Facility: CLINIC | Age: 34
End: 2019-07-26

## 2019-07-26 NOTE — TELEPHONE ENCOUNTER
spoke with pt regarding his upcoming appt. I made pt aware that I could not gurantee his procedure to be approved and that this appt was only a consult & that we did have a procedure day available thurs of week of his scheduled consult appt. Pt asked me what was the latest time for a procedure that Thursday, I made pt aware that if approved , The procedure could possibly be done at 4pm if the slot was available and his insurance approved the procedure. Pt verbalized understanding.               ----- Message from Flo Kumar sent at 7/26/2019 12:09 PM CDT -----  Contact: Pt  Needs Advice    Reason for call:The Pt would like to have a call back from your office about his appt on Tuesday and then also the possible appt for the procedure on Thursday.  Please contact the Pt.         Communication Preference:[phone]    Additional Information:

## 2019-07-26 NOTE — TELEPHONE ENCOUNTER
----- Message from Jeremi Ruiz MD sent at 7/26/2019 11:55 AM CDT -----  Contact: [phone]  gen surgery not likely to do an I&D on his scalp. Other choice is ENT which will be a much longer wait. He will need to wait for plastic appt or seek outside referral. thx  ----- Message -----  From: Jeremi Ruiz MD  Sent: 7/25/2019  12:51 PM  To: Jeremi Ruiz MD        ----- Message -----  From: Kathleen Randhawa  Sent: 7/25/2019  12:38 PM  To: Joseph Blood Staff     The plastic surgeon your recommended to him will not be able to do the surgery until 8/8. He needs this prior to 8/5. Please set this up with the 2nd option you had for him with the [facility].

## 2019-08-06 ENCOUNTER — OFFICE VISIT (OUTPATIENT)
Dept: PLASTIC SURGERY | Facility: CLINIC | Age: 34
End: 2019-08-06
Payer: COMMERCIAL

## 2019-08-06 VITALS — HEART RATE: 76 BPM | DIASTOLIC BLOOD PRESSURE: 82 MMHG | SYSTOLIC BLOOD PRESSURE: 125 MMHG

## 2019-08-06 DIAGNOSIS — R22.0 MASS OF SCALP: ICD-10-CM

## 2019-08-06 DIAGNOSIS — L72.0 EPIDERMAL CYST: Primary | ICD-10-CM

## 2019-08-06 PROCEDURE — 99202 PR OFFICE/OUTPT VISIT, NEW, LEVL II, 15-29 MIN: ICD-10-PCS | Mod: S$GLB,,, | Performed by: SURGERY

## 2019-08-06 PROCEDURE — 99202 OFFICE O/P NEW SF 15 MIN: CPT | Mod: S$GLB,,, | Performed by: SURGERY

## 2019-08-06 RX ORDER — OXYCODONE HYDROCHLORIDE 5 MG/1
5 TABLET ORAL EVERY 4 HOURS PRN
Qty: 5 TABLET | Refills: 0 | Status: SHIPPED | OUTPATIENT
Start: 2019-08-06 | End: 2020-01-27

## 2019-08-06 RX ORDER — DIAZEPAM 5 MG/1
5 TABLET ORAL EVERY 6 HOURS PRN
Qty: 1 TABLET | Refills: 0 | Status: SHIPPED | OUTPATIENT
Start: 2019-08-06 | End: 2019-09-05

## 2019-08-06 NOTE — PROGRESS NOTES
CC: epidermal cyst    HPI: This is a 33 y.o. male with a cyst that has been present for months. Patient states the cyst first appeared in December 2018. The location of the cyst is focal to the left mastoid. Patient reports he tried aspirating it at home, which produced white sebum. The cyst continued to reoccur and grow in size. The area is mildly tender. There are no modifying factors and there are no systemic associated signs and symptoms.     Med/Surg/Accidents:                See ROS                                                   CV: no congenital abn                                                     Pulm: + asthma, no chronic diseases    FH:   History of cancer:  Negative  Bleeding disorders:                         none  MH/anesthetic problems:                 none           Sickle Cell:                                      none  Allergy/Asthma:                              None                                                       Past Medical History:   Diagnosis Date    Asthma     Depression     Lateral epicondylitis 8/23/2013       History reviewed. No pertinent surgical history.      Current Outpatient Medications:     diclofenac (VOLTAREN) 75 MG EC tablet, Take 1 tablet (75 mg total) by mouth 2 (two) times daily. w food and water for 7-10 d, Disp: 30 tablet, Rfl: 1    ibuprofen (ADVIL,MOTRIN) 800 MG tablet, Take 1 tablet (800 mg total) by mouth 3 (three) times daily., Disp: 30 tablet, Rfl: 0    ondansetron (ZOFRAN-ODT) 8 MG TbDL, Take 1 tablet (8 mg total) by mouth every 6 (six) hours as needed., Disp: 30 tablet, Rfl: 0    sildenafil (VIAGRA) 100 MG tablet, Take 1 tablet (100 mg total) by mouth daily as needed. For erectile dysfunction, Disp: 30 tablet, Rfl: 1    Review of patient's allergies indicates:  No Known Allergies    Family History   Problem Relation Age of Onset    Migraines Mother     Diabetes Father     Heart disease Father            ROS  Review of Systems   Constitutional:  Negative for activity change, appetite change and unexpected weight change.   HENT: Negative.  Negative for congestion and facial swelling.    Eyes: Negative.  Negative for discharge, redness and itching.   Respiratory: Negative.  Negative for apnea and shortness of breath.         + asthma    Cardiovascular: Negative.  Negative for chest pain.   Musculoskeletal: Negative.  Negative for arthralgias, gait problem and myalgias.   Skin: Negative.  Negative for color change and pallor.        Cyst: left mastoid x 10 m, increasing in size   Allergic/Immunologic: Negative.    Neurological: Negative.  Negative for dizziness and facial asymmetry.   Psychiatric/Behavioral: Negative.          PE    Physical Exam   Constitutional: He is oriented to person, place, and time. He appears well-developed and well-nourished.   HENT:   Head: Normocephalic and atraumatic. Head is without abrasion, without contusion and without laceration.   Right Ear: External ear normal.   Left Ear: External ear normal.   Nose: Nose normal.   Mouth/Throat: Oropharynx is clear and moist.   Eyes: Pupils are equal, round, and reactive to light. Conjunctivae, EOM and lids are normal.   Neck: Trachea normal, full passive range of motion without pain and phonation normal. Neck supple.   Cardiovascular: Normal rate, regular rhythm and normal heart sounds.   Pulmonary/Chest: Effort normal and breath sounds normal. No stridor. No respiratory distress.   Musculoskeletal: Normal range of motion.   Neurological: He is alert and oriented to person, place, and time.   Skin: Skin is warm, dry and intact. Capillary refill takes less than 2 seconds. No abrasion, no bruising, no burn, no ecchymosis, no laceration, no lesion and no rash noted. No erythema.   On exam, the location of the cyst is focal to the left mastoid process. There is a punctum present.    The cyst is non-mobile, hard There are no modifying factors and there are no systemic associated signs and  symptoms.   Psychiatric: He has a normal mood and affect. His speech is normal and behavior is normal. Judgment and thought content normal. Cognition and memory are normal.   Nursing note and vitals reviewed.       Imaging Studies  Not indicated at this time    Assessment:  Assessment   1. Epidermal cyst     Scalp          Plan  Plan   I feel he is a good candidate for excision of a epidermal cyst and every effort will be made to place the scar in an aesthetically pleasing location. The risks, benefits, and alternatives are reviewed and permission is granted to proceed.  Will submit information for authorization.  Needs booking sheet with CPT codes 39988, 99043.  8/22 for minor date scheduled  He can place orajel over the lesion 1 hour before the procedure and cover it with a tegaderm.  Wrote for pre-procedural valium.  He verbalized understanding that he is to take it 1 hour before scheduled procedure.  He cannot drive or use alcohol while taking this.  He says that his wife will bring him in to the appointment.  I explained to him that I would use local anesthesia injected around the area prior to excision.  We came up with the plan of oral sedation and topical premedication as outlined above to address his concerns.      30 minutes was spend with patient, more than 50% was spent explaining the nature of the procedure, perioperative anesthesia or answering questions, expected recovery.         Plastic & Reconstructive Surgery  [facility]  c/o Binh Bocanegra M.D.  [facility]  [address]  [address] [place] [address]    Work [phone]  Toll free [phone]  If no answer [phone]

## 2019-08-06 NOTE — LETTER
August 6, 2019      Jeremi Ruiz MD  [address]  [facility] [address]           [facility] [place] [address] [id number]  [address] [address]  Phone: [phone]  Fax: [phone]       [facility] [place] [id number]  [address] [address]  Phone: [phone]  Fax: [phone] August 6, 2019      Jeremi Ruiz MD  [address]  [facility] [address]    Patient: Varinder Salazar   MR Number: [id number]   YOB: 1985   Date of Visit: 8/6/2019     Dear Dr. Jeremi Ruiz:    Thank you for referring Varinder Salazar to me for evaluation. Below you will find relevant portions of my assessment and plan of care.    Assessment:   1. Epidermal cyst Scalp       I feel he is a good candidate for excision of a epidermal cyst and every effort will be made to place the scar in an aesthetically pleasing location. The risks, benefits, and alternatives are reviewed and permission is granted to proceed.    Will submit information for authorization.  Needs booking sheet with CPT codes 83103, 37088.  8/22 for minor procedure date was scheduled. He can place orajel over the lesion 1 hour before the procedure and cover it with a tegaderm.    Wrote for pre-procedural valium.  He verbalized understanding that he is to take it 1 hour before scheduled procedure.  He cannot drive or use alcohol while taking this.  He says that his wife will bring him in to the appointment. I explained to him that I would use local anesthesia injected around the area prior to excision.  We came up with the plan of oral sedation and topical premedication to address his concerns.       30 minutes was spend with patient, more than 50% was spent explaining the nature of the procedure, perioperative anesthesia or answering questions, expected recovery.     If you have questions, please do not hesitate to call me. I look forward to following Varinder Salazar along with  you.    Sincerely,      Binh Bocanegra MD  Section of Plastic & Reconstructive Surgery  Department of Surgery  [facility]    TYRONEK/hcr

## 2019-08-21 ENCOUNTER — TELEPHONE (OUTPATIENT)
Dept: PLASTIC SURGERY | Facility: CLINIC | Age: 34
End: 2019-08-21

## 2019-08-21 RX ORDER — DIAZEPAM 5 MG/1
5 TABLET ORAL EVERY 6 HOURS PRN
Qty: 1 TABLET | Refills: 0 | Status: SHIPPED | OUTPATIENT
Start: 2019-08-21 | End: 2020-01-27

## 2019-08-21 NOTE — TELEPHONE ENCOUNTER
spoke with pt and confirmed that his Rx was sent over and it was to be taken an hour before his procedure. Pt verbalized understanding.                ----- Message from Que Peacock sent at 8/21/2019  9:44 AM CDT -----  Contact: pt  Needs Advice    Reason for call: the pt is calling to speak with the nurse. The pt states he was told he would be getting a prescription called in before his surgery. The pt states he wants to know if he will get the prescription today or tomorrow.     [id number] - [place], [place] - [address] AT [facility] [address]  [facility] [address]  Phone: [phone] Fax: [phone]    Communication Preference: [phone]     Additional Information:

## 2019-08-22 ENCOUNTER — TELEPHONE (OUTPATIENT)
Dept: PLASTIC SURGERY | Facility: CLINIC | Age: 34
End: 2019-08-22

## 2019-08-22 ENCOUNTER — PROCEDURE VISIT (OUTPATIENT)
Dept: PLASTIC SURGERY | Facility: CLINIC | Age: 34
End: 2019-08-22
Payer: COMMERCIAL

## 2019-08-22 VITALS — WEIGHT: 191 LBS | BODY MASS INDEX: 29.91 KG/M2

## 2019-08-22 DIAGNOSIS — R22.0 MASS OF SCALP: Primary | ICD-10-CM

## 2019-08-22 PROCEDURE — 88304 TISSUE SPECIMEN TO PATHOLOGY, SURGERY: ICD-10-PCS | Mod: 26,,, | Performed by: PATHOLOGY

## 2019-08-22 PROCEDURE — 88304 TISSUE EXAM BY PATHOLOGIST: CPT | Mod: 26,,, | Performed by: PATHOLOGY

## 2019-08-22 PROCEDURE — 88304 TISSUE EXAM BY PATHOLOGIST: CPT | Performed by: PATHOLOGY

## 2019-08-22 RX ORDER — OXYCODONE HYDROCHLORIDE 5 MG/1
5 TABLET ORAL EVERY 6 HOURS PRN
Qty: 5 TABLET | Refills: 0 | Status: SHIPPED | OUTPATIENT
Start: 2019-08-22 | End: 2020-01-27

## 2019-08-22 NOTE — TELEPHONE ENCOUNTER
spoke with pt and squared him away for his procedure. Pt verbalized understading.                 ----- Message from Ngoc Vazquez sent at 8/22/2019 12:54 PM CDT -----  Contact: pt @ [phone]  Calling to speak with someone in Dr. Bocanegra's office regarding his procedure today @4pm, asking if he needed to fast. Please call.

## 2019-08-23 ENCOUNTER — OFFICE VISIT (OUTPATIENT)
Dept: URGENT CARE | Facility: CLINIC | Age: 34
End: 2019-08-23
Payer: COMMERCIAL

## 2019-08-23 ENCOUNTER — TELEPHONE (OUTPATIENT)
Dept: PLASTIC SURGERY | Facility: CLINIC | Age: 34
End: 2019-08-23

## 2019-08-23 VITALS
WEIGHT: 191 LBS | OXYGEN SATURATION: 98 % | TEMPERATURE: 98 F | DIASTOLIC BLOOD PRESSURE: 81 MMHG | SYSTOLIC BLOOD PRESSURE: 132 MMHG | HEART RATE: 89 BPM | HEIGHT: 67 IN | BODY MASS INDEX: 29.98 KG/M2 | RESPIRATION RATE: 18 BRPM

## 2019-08-23 DIAGNOSIS — T79.2XXA SEROMA, POST-TRAUMATIC: Primary | ICD-10-CM

## 2019-08-23 DIAGNOSIS — M54.2 NECK PAIN: ICD-10-CM

## 2019-08-23 PROCEDURE — 99203 PR OFFICE/OUTPT VISIT, NEW, LEVL III, 30-44 MIN: ICD-10-PCS | Mod: S$GLB,,, | Performed by: EMERGENCY MEDICINE

## 2019-08-23 PROCEDURE — 99203 OFFICE O/P NEW LOW 30 MIN: CPT | Mod: S$GLB,,, | Performed by: EMERGENCY MEDICINE

## 2019-08-23 RX ORDER — HYDROCODONE BITARTRATE AND ACETAMINOPHEN 7.5; 325 MG/1; MG/1
1 TABLET ORAL EVERY 8 HOURS PRN
Qty: 12 TABLET | Refills: 0 | Status: SHIPPED | OUTPATIENT
Start: 2019-08-23 | End: 2019-08-27

## 2019-08-23 RX ORDER — MUPIROCIN 20 MG/G
OINTMENT TOPICAL
Qty: 22 G | Refills: 1 | Status: SHIPPED | OUTPATIENT
Start: 2019-08-23 | End: 2020-01-27

## 2019-08-23 RX ORDER — SULFAMETHOXAZOLE AND TRIMETHOPRIM 800; 160 MG/1; MG/1
1 TABLET ORAL 2 TIMES DAILY
Qty: 20 TABLET | Refills: 0 | Status: SHIPPED | OUTPATIENT
Start: 2019-08-23 | End: 2019-09-02

## 2019-08-23 NOTE — TELEPHONE ENCOUNTER
spoke with pt and he stated that he was in a little pain since his procedure and he went over to urgent care to be seen and the Dr he saw there squeezed it and blood came out I told pt that was normal,He asked could he get a work letter for Sunday to let him be excused as he wasn't sure what his pain level would be this weekend and he would be able to contact us . I told pt that was fine and that I would contact pt on Monday to touch base with him. He verbalized understanding.

## 2019-08-23 NOTE — PATIENT INSTRUCTIONS
Large amount of serous fluid and blood removed during reopening of the drainage site left by surgeon.    Bactrim double strength prescription  Bactroban ointment prescription  [place] prescription for pain  Ibuprofen 600 mg every 6 hr for pain    Return for any fevers chills spreading redness, otherwise follow up with your surgeon who did the procedure.      Postsurgical Seroma    A seroma is a sterile collection of fluid under the skin, usually at the site of a surgical incision. Fluid builds up under the skin where tissue was removed. It may form soon after your surgery. Or it may form up to about 1 to 2 weeks after surgery. It may look like a swollen lump and feel tender or sore.  A small seroma is not dangerous. Depending on its size and symptoms, it may not need to be treated. The seroma may go away on its own within a few weeks or months. Your body slowly absorbs the fluid. No medicine will make it go away faster. But if you have a large seroma or if it is causing pain, your healthcare provider may drain it. This is done with a syringe and needle. Or the provider may put in a drain. Seromas can return and may need to be drained multiple times. Rarely, you may need a minor procedure to remove the seroma. Long-term problems from a seroma are rare.  Home care  You may be given medicines to relieve pain. These may include acetaminophen and ibuprofen. Take these as directed. Check the seroma daily for the signs of infection listed below.  Follow-up care  Follow up with your healthcare provider, or as advised.  When to seek medical advice  Call your healthcare provider right away if you have signs of infection:  · Fever of 100.4°F (38°C) or higher, or as directed by your healthcare provider  · Seroma or skin around it feels warm  · Pain in the seroma that gets worse  · Redness or swelling that gets worse  Also call your provider right away if any of these occur:  · Drainage from the seroma that is white or colored or  very bloody. Clear or slightly bloody drainage is normal.  · Wound opens up  · Rapid heart rate  · Shortness of breath  Date Last Reviewed: 1/1/2017  © 2106-6151 [employer]. [address] [place] [address], [place] [address]. All rights reserved. This information is not intended as a substitute for professional medical care. Always follow your healthcare professional's instructions.

## 2019-08-23 NOTE — PROGRESS NOTES
"Subjective:       Patient ID: Varinder Salazar is a 33 y.o. male.    Vitals:    08/23/19 0948   BP: 132/81   Pulse: 89   Resp: 18   Temp: 98 °F (36.7 °C)   TempSrc: Oral   SpO2: 98%   Weight: 86.6 kg (191 lb)   Height: 5' 7" (1.702 m)       Chief Complaint: Neck Pain    Pt states yesterday he had cyst removed on back of neck behind left ear. Pt c/o pain to area. Pt taking oxycodone and not helping with the pain. Pt states they did not go over any after care instructions on what to do or how to clean the area. Apparently it was a cyst, not infected cyst nor abscess, and they were not prescribed antibiotics. They are concerned for infection and pain, however it appears to be a seroma/blood collection rather than abscess/pus pocket. No fevers, no chills, no spreading redness.    Neck Pain    This is a new problem. The current episode started yesterday. The problem occurs constantly. The problem has been gradually worsening. The pain is associated with nothing. The quality of the pain is described as stabbing. The pain is at a severity of 9/10. The symptoms are aggravated by position. The pain is same all the time. Pertinent negatives include no chest pain, fever or headaches. He has tried oral narcotics for the symptoms. The treatment provided no relief.     Review of Systems   Constitution: Negative for chills and fever.   HENT: Negative for sore throat.    Eyes: Negative for blurred vision.   Cardiovascular: Negative for chest pain.   Respiratory: Negative for shortness of breath.    Skin: Negative for rash.   Musculoskeletal: Positive for neck pain. Negative for back pain and joint pain.   Gastrointestinal: Negative for abdominal pain, diarrhea, nausea and vomiting.   Neurological: Negative for headaches.   Psychiatric/Behavioral: The patient is not nervous/anxious.        Objective:      Physical Exam   Constitutional: He is oriented to person, place, and time. He appears well-developed and well-nourished.   HENT: "   Head: Normocephalic and atraumatic. Head is without abrasion, without contusion and without laceration.   Right Ear: External ear normal.   Left Ear: External ear normal.   Nose: Nose normal.   Mouth/Throat: Oropharynx is clear and moist.   Eyes: Pupils are equal, round, and reactive to light. Conjunctivae, EOM and lids are normal.   Neck: Trachea normal, full passive range of motion without pain and phonation normal. Neck supple.   Cardiovascular: Normal rate, regular rhythm and normal heart sounds.   Pulmonary/Chest: Effort normal and breath sounds normal. No stridor. No respiratory distress.   Musculoskeletal: Normal range of motion.   Neurological: He is alert and oriented to person, place, and time. No sensory deficit. He exhibits normal muscle tone.   Skin: Skin is dry and intact. Capillary refill takes less than 2 seconds. No abrasion, no bruising, no burn, no ecchymosis, no laceration, no lesion and no rash noted. No erythema.   Left post auricular swelling, sutures at the superiormost aspect of the wound without dehiscence. Apparent purposefully place stab incision in the center of the area for drainage as per patient, however it has closed up. Golf ball sized swelling, edema, conssistent with either abscess vs seroma vs retained cystic structures.  Incision and manual pressure reveals collection of old blood and seroma with relief on manual pressure.   Psychiatric: He has a normal mood and affect. His speech is normal. Cognition and memory are normal.   Nursing note and vitals reviewed.      Assessment:       1. Seroma, post-traumatic    2. Neck pain        Plan:       Varinder was seen today for neck pain.    Diagnoses and all orders for this visit:    Seroma, post-traumatic  Comments:  post-procedural    Neck pain    Other orders  -     sulfamethoxazole-trimethoprim 800-160mg (BACTRIM DS) 800-160 mg Tab; Take 1 tablet by mouth 2 (two) times daily. for 10 days  -     HYDROcodone-acetaminophen (NORCO)  7.5-325 mg per tablet; Take 1 tablet by mouth every 8 (eight) hours as needed for Pain.  -     mupirocin (BACTROBAN) 2 % ointment; Apply to affected area 3 times daily          Patient Instructions   Large amount of serous fluid and blood removed during reopening of the drainage site left by surgeon.    Bactrim double strength prescription  Bactroban ointment prescription  [place] prescription for pain  Ibuprofen 600 mg every 6 hr for pain    Return for any fevers chills spreading redness, otherwise follow up with your surgeon who did the procedure.      Postsurgical Seroma    A seroma is a sterile collection of fluid under the skin, usually at the site of a surgical incision. Fluid builds up under the skin where tissue was removed. It may form soon after your surgery. Or it may form up to about 1 to 2 weeks after surgery. It may look like a swollen lump and feel tender or sore.  A small seroma is not dangerous. Depending on its size and symptoms, it may not need to be treated. The seroma may go away on its own within a few weeks or months. Your body slowly absorbs the fluid. No medicine will make it go away faster. But if you have a large seroma or if it is causing pain, your healthcare provider may drain it. This is done with a syringe and needle. Or the provider may put in a drain. Seromas can return and may need to be drained multiple times. Rarely, you may need a minor procedure to remove the seroma. Long-term problems from a seroma are rare.  Home care  You may be given medicines to relieve pain. These may include acetaminophen and ibuprofen. Take these as directed. Check the seroma daily for the signs of infection listed below.  Follow-up care  Follow up with your healthcare provider, or as advised.  When to seek medical advice  Call your healthcare provider right away if you have signs of infection:  · Fever of 100.4°F (38°C) or higher, or as directed by your healthcare provider  · Seroma or skin around it  feels warm  · Pain in the seroma that gets worse  · Redness or swelling that gets worse  Also call your provider right away if any of these occur:  · Drainage from the seroma that is white or colored or very bloody. Clear or slightly bloody drainage is normal.  · Wound opens up  · Rapid heart rate  · Shortness of breath  Date Last Reviewed: 1/1/2017  © 9395-8545 [employer] [address] [place] [address], [place] [address]. All rights reserved. This information is not intended as a substitute for professional medical care. Always follow your healthcare professional's instructions.

## 2019-08-26 ENCOUNTER — OFFICE VISIT (OUTPATIENT)
Dept: URGENT CARE | Facility: CLINIC | Age: 34
End: 2019-08-26
Payer: COMMERCIAL

## 2019-08-26 VITALS
OXYGEN SATURATION: 100 % | HEART RATE: 92 BPM | RESPIRATION RATE: 18 BRPM | HEIGHT: 67 IN | BODY MASS INDEX: 29.98 KG/M2 | TEMPERATURE: 98 F | DIASTOLIC BLOOD PRESSURE: 90 MMHG | SYSTOLIC BLOOD PRESSURE: 138 MMHG | WEIGHT: 191 LBS

## 2019-08-26 DIAGNOSIS — Z51.89 VISIT FOR WOUND CHECK: Primary | ICD-10-CM

## 2019-08-26 PROCEDURE — 99499 UNLISTED E&M SERVICE: CPT | Mod: S$GLB,,, | Performed by: EMERGENCY MEDICINE

## 2019-08-26 PROCEDURE — 99499 NO LOS: ICD-10-PCS | Mod: S$GLB,,, | Performed by: EMERGENCY MEDICINE

## 2019-08-26 NOTE — PROCEDURES
OFFICE PROCEDURE    Name: Varinder Salazar  MRN: [id number]  Date: 8/6/2019    PRE-OP DIAGNOSIS:  Encounter Diagnoses   Name Primary?    Mass of scalp Yes       POST-OP DIAGNOSIS:  Same    PROCEDURES:  EXCISION, BENIGN mass scalp  Layered closure scalp    ANESTHESIA:  Lidocaine 1% with epinephrine 1:200,000 10 ml    FINDINGS: Well encapsulated subcutaneous cystic mass 3 cm, total incision 3.5 cm.    SURGEON: Ermias Bocanegra MD  EBL: minimal  COMPLICATIONS: none  SPECIMENS: cystic mass  DISPOSITION: good condition, discharged to home.  The patient tolerated the procedure without adverse consequences    INDICATIONS:  The risks, benefits, alternatives, and expected outcomes were discussed with the patient's parents; the risks including but not limited to poor scarring, wound healing problem, infection, bleeding, keloid, hypertrophic scarring, incomplete removal, recurrence of mass, sensory nerve injury. Informed consent was obtained.  All questions were answered.      PROCEDURE:  The patient was brought to the procedure room and placed in a supine position.  Time out and verification procedure were performed. 10 cc 1% lidocaine with 1:200,000 epinephrine solution was injected locally. The patient was prepped and draped in sterile fashion. Incision was made eccentrically and blunt dissection around the mass was performed under direct visualization.  The cystic mass was excised and sent for permanent pathology.  Hemostasis was achieved. The wound was closed with interrupted 3-0 [place] interrupted 3-0 chromic. The patient tolerating the procedure well without complication.  A bacitracin dressing was placed and he was instructed on further wound care.      AFTERCARE  Explained to him the following instructions at his initial visit and at time of consent  Antibiotics not indicated at this time  Avoid driving while taking pain medication  Recommend taking the remainder of the week off  No lifting more than 5 lbs  Avoid perspiration  until follow up if possible  Baby shampoo for 3 weeks  Would avoid buzzing hair if possible to avoid irritation of surgical incision  Sleep with head of bed elevated if possible  Pat wound dry, no tub soaking  Follow up in 2-3 weeks, follow up pathology

## 2019-08-27 ENCOUNTER — OFFICE VISIT (OUTPATIENT)
Dept: PLASTIC SURGERY | Facility: CLINIC | Age: 34
End: 2019-08-27
Payer: COMMERCIAL

## 2019-08-27 VITALS
BODY MASS INDEX: 29.28 KG/M2 | DIASTOLIC BLOOD PRESSURE: 70 MMHG | SYSTOLIC BLOOD PRESSURE: 120 MMHG | WEIGHT: 186.94 LBS | HEART RATE: 78 BPM

## 2019-08-27 DIAGNOSIS — R22.0 MASS OF SCALP: Primary | ICD-10-CM

## 2019-08-27 PROCEDURE — 99999 PR PBB SHADOW E&M-EST. PATIENT-LVL III: CPT | Mod: PBBFAC,,, | Performed by: SURGERY

## 2019-08-27 PROCEDURE — 99024 POSTOP FOLLOW-UP VISIT: CPT | Mod: S$GLB,,, | Performed by: SURGERY

## 2019-08-27 PROCEDURE — 99999 PR PBB SHADOW E&M-EST. PATIENT-LVL III: ICD-10-PCS | Mod: PBBFAC,,, | Performed by: SURGERY

## 2019-08-27 PROCEDURE — 99024 PR POST-OP FOLLOW-UP VISIT: ICD-10-PCS | Mod: S$GLB,,, | Performed by: SURGERY

## 2019-08-27 NOTE — PROGRESS NOTES
"Subjective:       Patient ID: Varinder Salazar is a 33 y.o. male.    Vitals:  height is 5' 7" (1.702 m) and weight is 86.6 kg (191 lb). His temperature is 98 °F (36.7 °C). His blood pressure is 138/90 (abnormal) and his pulse is 92. His respiration is 18 and oxygen saturation is 100%.     Chief Complaint: Abscess    Pt presents today with concerns of his healing abscess. He denies any pain and complaints minor itching.     HAS APPOINTMENT WITH SURGEON TOMORROW. WOUND ACTUALLY LOOKS REMARKABLY IMPROVED FROM PRIOR VISIT, NO DRAINAGE, NO ERYTHEMA, SUTURES INTACT. NO EDEMA    Abscess   Chronicity:  RecurrentProgression Since Onset: worsening after initial improvement  Location:  Head/neck  Associated Symptoms: no fever, no chills, no sweats  Characteristics: itching and swelling    Characteristics: not painful, no redness, no dryness, no peeling, no bruising and no blistering    Pain Scale:  0/10      Constitution: Negative for chills, fatigue and fever.   HENT: Negative for congestion and sore throat.    Neck: Negative for painful lymph nodes.   Cardiovascular: Negative for chest pain and leg swelling.   Eyes: Negative for double vision and blurred vision.   Respiratory: Negative for cough and shortness of breath.    Gastrointestinal: Negative for nausea, vomiting and diarrhea.   Genitourinary: Negative for dysuria, frequency and urgency.   Musculoskeletal: Negative for joint pain, joint swelling, muscle cramps and muscle ache.   Skin: Positive for abscess. Negative for color change, pale, rash and erythema.   Allergic/Immunologic: Negative for seasonal allergies.   Neurological: Negative for dizziness, history of vertigo, light-headedness, passing out and headaches.   Hematologic/Lymphatic: Negative for swollen lymph nodes, easy bruising/bleeding and history of blood clots. Does not bruise/bleed easily.   Psychiatric/Behavioral: Negative for nervous/anxious, sleep disturbance and depression. The patient is not " nervous/anxious.        Objective:      Physical Exam   Constitutional: He is oriented to person, place, and time. He appears well-developed and well-nourished.   HENT:   Head: Normocephalic and atraumatic. Head is without abrasion, without contusion and without laceration.   Right Ear: External ear normal.   Left Ear: External ear normal.   Nose: Nose normal.   Mouth/Throat: Oropharynx is clear and moist.   POST AURICULAR SWELLING AND POST-SURGICAL CHANGES, REMARKABLY IMPROVED COMPARED TO PRIOR VISIT BEFORE DRAINAGE OF HEMATOMA/SEROMA   Eyes: Pupils are equal, round, and reactive to light. Conjunctivae, EOM and lids are normal.   Neck: Trachea normal, full passive range of motion without pain and phonation normal. Neck supple.   Cardiovascular: Normal rate, regular rhythm and normal heart sounds.   Pulmonary/Chest: Effort normal and breath sounds normal. No stridor. No respiratory distress.   Musculoskeletal: Normal range of motion.   Neurological: He is alert and oriented to person, place, and time.   Skin: Skin is warm, dry and intact. Capillary refill takes less than 2 seconds. No abrasion, no bruising, no burn, no ecchymosis, no laceration, no lesion and no rash noted. No erythema.   Psychiatric: He has a normal mood and affect. His speech is normal. Cognition and memory are normal.   Nursing note and vitals reviewed.      Assessment:       1. Visit for wound check        Plan:         Visit for wound check          Patient Instructions   Wound looks very much improved from prior visit  Be sure to finish your antibiotics by mouth and topically as well.  Also keep your appointment with the surgeon who performed the surgery tomorrow morning

## 2019-08-27 NOTE — PATIENT INSTRUCTIONS
Wound looks very much improved from prior visit  Be sure to finish your antibiotics by mouth and topically as well.  Also keep your appointment with the surgeon who performed the surgery tomorrow morning

## 2019-09-03 ENCOUNTER — TELEPHONE (OUTPATIENT)
Dept: PRIMARY CARE CLINIC | Facility: CLINIC | Age: 34
End: 2019-09-03

## 2019-09-03 ENCOUNTER — OFFICE VISIT (OUTPATIENT)
Dept: PRIMARY CARE CLINIC | Facility: CLINIC | Age: 34
End: 2019-09-03
Payer: COMMERCIAL

## 2019-09-03 VITALS
WEIGHT: 192 LBS | BODY MASS INDEX: 30.13 KG/M2 | DIASTOLIC BLOOD PRESSURE: 80 MMHG | SYSTOLIC BLOOD PRESSURE: 120 MMHG | TEMPERATURE: 98 F | HEART RATE: 89 BPM | HEIGHT: 67 IN

## 2019-09-03 DIAGNOSIS — E66.09 CLASS 1 OBESITY DUE TO EXCESS CALORIES WITHOUT SERIOUS COMORBIDITY WITH BODY MASS INDEX (BMI) OF 30.0 TO 30.9 IN ADULT: ICD-10-CM

## 2019-09-03 DIAGNOSIS — N52.8 OTHER MALE ERECTILE DYSFUNCTION: ICD-10-CM

## 2019-09-03 DIAGNOSIS — G89.29 CHRONIC LEFT-SIDED LOW BACK PAIN WITH LEFT-SIDED SCIATICA: ICD-10-CM

## 2019-09-03 DIAGNOSIS — L02.811 SCALP ABSCESS: Primary | ICD-10-CM

## 2019-09-03 DIAGNOSIS — M54.42 CHRONIC LEFT-SIDED LOW BACK PAIN WITH LEFT-SIDED SCIATICA: ICD-10-CM

## 2019-09-03 PROCEDURE — 99999 PR PBB SHADOW E&M-EST. PATIENT-LVL III: CPT | Mod: PBBFAC,,, | Performed by: FAMILY MEDICINE

## 2019-09-03 PROCEDURE — 99999 PR PBB SHADOW E&M-EST. PATIENT-LVL III: ICD-10-PCS | Mod: PBBFAC,,, | Performed by: FAMILY MEDICINE

## 2019-09-03 PROCEDURE — 99213 PR OFFICE/OUTPT VISIT, EST, LEVL III, 20-29 MIN: ICD-10-PCS | Mod: S$GLB,,, | Performed by: FAMILY MEDICINE

## 2019-09-03 PROCEDURE — 99213 OFFICE O/P EST LOW 20 MIN: CPT | Mod: S$GLB,,, | Performed by: FAMILY MEDICINE

## 2019-09-03 RX ORDER — CLINDAMYCIN AND BENZOYL PEROXIDE 10; 50 MG/G; MG/G
GEL TOPICAL 2 TIMES DAILY
Qty: 25 G | Refills: 0 | Status: SHIPPED | OUTPATIENT
Start: 2019-09-03 | End: 2020-01-27

## 2019-09-03 RX ORDER — CLINDAMYCIN HYDROCHLORIDE 300 MG/1
300 CAPSULE ORAL EVERY 6 HOURS
Qty: 30 CAPSULE | Refills: 0 | Status: SHIPPED | OUTPATIENT
Start: 2019-09-03 | End: 2020-01-27

## 2019-09-03 NOTE — TELEPHONE ENCOUNTER
----- Message from Monika Biggs sent at 9/3/2019 12:58 PM CDT -----  Contact: [phone]  [facility] is calling to get the correct quantity for medication clindamycin (CLEOCIN) 300 MG capsule.      [id number] [place] [address] [address] AT [facility] & [address]   [phone] (Phone)  [phone] (Fax)  Please advise, thanks

## 2019-09-03 NOTE — TELEPHONE ENCOUNTER
With directions of taking medication every 6 hours for 10 days, should be dispensing #40. Prescription clarified with Jen at [facility].

## 2019-09-04 PROBLEM — E66.09 CLASS 1 OBESITY DUE TO EXCESS CALORIES WITHOUT SERIOUS COMORBIDITY WITH BODY MASS INDEX (BMI) OF 30.0 TO 30.9 IN ADULT: Status: ACTIVE | Noted: 2019-09-04

## 2019-09-04 PROBLEM — E66.811 CLASS 1 OBESITY DUE TO EXCESS CALORIES WITHOUT SERIOUS COMORBIDITY WITH BODY MASS INDEX (BMI) OF 30.0 TO 30.9 IN ADULT: Status: ACTIVE | Noted: 2019-09-04

## 2019-09-04 NOTE — PROGRESS NOTES
Subjective:   Patient ID: Varinder Salazar is a 33 y.o. male.    Chief Complaint: Cyst (removal follow up)      HPI  Pt had I&D recently on scalp and wants f/u eval. No fever or chills. Discomfort minimal.     Patient queried and denies any further complaints.    ALLERGIES AND MEDICATIONS: updated and reviewed.  Review of patient's allergies indicates:  No Known Allergies    Current Outpatient Medications:     mupirocin (BACTROBAN) 2 % ointment, Apply to affected area 3 times daily, Disp: 22 g, Rfl: 1    clindamycin (CLEOCIN) 300 MG capsule, Take 1 capsule (300 mg total) by mouth every 6 (six) hours. X 10 days w food and water, Disp: 30 capsule, Rfl: 0    clindamycin-benzoyl peroxide (BENZACLIN) gel, Apply topically 2 (two) times daily., Disp: 25 g, Rfl: 0    diazePAM (VALIUM) 5 MG tablet, Take 1 tablet (5 mg total) by mouth every 6 (six) hours as needed for Anxiety (take 1 hour before scheduled procedure.  currently scheduled for 8/22/19)., Disp: 1 tablet, Rfl: 0    diazePAM (VALIUM) 5 MG tablet, Take 1 tablet (5 mg total) by mouth every 6 (six) hours as needed (take it 1 hour before procedure 8-22-19)., Disp: 1 tablet, Rfl: 0    diclofenac (VOLTAREN) 75 MG EC tablet, Take 1 tablet (75 mg total) by mouth 2 (two) times daily. w food and water for 7-10 d, Disp: 30 tablet, Rfl: 1    ibuprofen (ADVIL,MOTRIN) 800 MG tablet, Take 1 tablet (800 mg total) by mouth 3 (three) times daily., Disp: 30 tablet, Rfl: 0    ondansetron (ZOFRAN-ODT) 8 MG TbDL, Take 1 tablet (8 mg total) by mouth every 6 (six) hours as needed., Disp: 30 tablet, Rfl: 0    oxyCODONE (ROXICODONE) 5 MG immediate release tablet, Take 1 tablet (5 mg total) by mouth every 4 (four) hours as needed for Pain (severe post procedure pain.  procedure scheuduled for 8/22)., Disp: 5 tablet, Rfl: 0    oxyCODONE (ROXICODONE) 5 MG immediate release tablet, Take 1 tablet (5 mg total) by mouth every 6 (six) hours as needed for Pain (brekathrough pain)., Disp: 5  "tablet, Rfl: 0    sildenafil (VIAGRA) 100 MG tablet, Take 1 tablet (100 mg total) by mouth daily as needed. For erectile dysfunction, Disp: 30 tablet, Rfl: 1    Review of Systems   Constitutional: Negative for activity change, appetite change, chills, diaphoresis, fatigue, fever and unexpected weight change.   HENT: Negative for congestion, ear discharge, ear pain, postnasal drip, rhinorrhea, sneezing and sore throat.    Eyes: Negative for photophobia and discharge.   Respiratory: Negative for cough, chest tightness, shortness of breath and wheezing.    Cardiovascular: Negative for chest pain and palpitations.   Gastrointestinal: Negative for abdominal distention, abdominal pain, diarrhea, nausea and vomiting.   Genitourinary: Negative for dysuria.   Musculoskeletal: Negative for arthralgias and neck pain.   Skin: Negative for rash.   Neurological: Negative for headaches.       Objective:     Vitals:    09/03/19 1115   BP: 120/80   Pulse: 89   Temp: 98.2 °F (36.8 °C)   TempSrc: Oral   Weight: 87.1 kg (192 lb 0.3 oz)   Height: 5' 7" (1.702 m)   PainSc: 0-No pain     Body mass index is 30.07 kg/m².    Physical Exam   Constitutional: He is oriented to person, place, and time. He appears well-developed and well-nourished.   HENT:   Head: Normocephalic.       Right Ear: Hearing, tympanic membrane and ear canal normal. No drainage or swelling. No decreased hearing is noted.   Left Ear: Hearing, tympanic membrane and ear canal normal. No drainage or swelling. No decreased hearing is noted.   Nose: No rhinorrhea.   Mouth/Throat: No posterior oropharyngeal edema or posterior oropharyngeal erythema.   Well healing area as in drawing with evidence of I&d and some mild surrounding edema     Eyes: Lids are normal. Right eye exhibits no exudate. Left eye exhibits no exudate. Right conjunctiva is not injected. Left conjunctiva is not injected.   Neck: Trachea normal and full passive range of motion without pain. Normal carotid " pulses and no hepatojugular reflux present. Carotid bruit is not present. No neck rigidity. No edema and no erythema present. No thyroid mass present.   Abdominal: Normal appearance. There is negative Stephen's sign.   Neurological: He is alert and oriented to person, place, and time.   Skin: Skin is warm and dry.   Psychiatric: He has a normal mood and affect. His speech is normal and behavior is normal.   Nursing note and vitals reviewed.      Assessment and Plan:   Varinder was seen today for cyst.    Diagnoses and all orders for this visit:    Scalp abscess    Class 1 obesity due to excess calories without serious comorbidity with body mass index (BMI) of 30.0 to 30.9 in adult    Chronic left-sided low back pain with left-sided sciatica    Other male erectile dysfunction    Other orders  -     clindamycin-benzoyl peroxide (BENZACLIN) gel; Apply topically 2 (two) times daily.  -     clindamycin (CLEOCIN) 300 MG capsule; Take 1 capsule (300 mg total) by mouth every 6 (six) hours. X 10 days w food and water        Follow up in about 2 weeks (around 9/17/2019).    THIS NOTE WILL BE SHARED WITH THE PATIENT.

## 2019-09-04 NOTE — PROGRESS NOTES
Varinder returns for follow up after excision of scalp mass.  He had to report to the ED for pain and drainage from the incision.  He denies drainage.  Per his report the emergency room expressed fluid from the wound and placed him on antibiotics.  On examination today he has a well approximated incision with good contour.  I was unable to express any fluid from the wound.  There is no evidence of cellulitis or abscess.  There is good approximation of skin.  I reassured him at this visit and advised him to continue using baby shampoo as directed until 3 weeks post operatively.  He can clean any crust from his incision.  If he has any new redness, drainage or pain, he can call us to be seen sooner than his previously scheduled follow up.  If there is concern on final pathology, we will call him before this visit.  All of his questions were answered.      Plastic & Reconstructive Surgery  [facility]  c/o Binh Bocanegra M.D.  [facility]  [address]  [address] [place] [address]    Work [phone]  Toll free [phone]  If no answer [phone]

## 2020-01-27 ENCOUNTER — TELEPHONE (OUTPATIENT)
Dept: PRIMARY CARE CLINIC | Facility: CLINIC | Age: 35
End: 2020-01-27

## 2020-01-27 ENCOUNTER — OFFICE VISIT (OUTPATIENT)
Dept: PRIMARY CARE CLINIC | Facility: CLINIC | Age: 35
End: 2020-01-27
Payer: COMMERCIAL

## 2020-01-27 VITALS
HEIGHT: 67 IN | SYSTOLIC BLOOD PRESSURE: 120 MMHG | WEIGHT: 188.25 LBS | BODY MASS INDEX: 29.55 KG/M2 | HEART RATE: 92 BPM | OXYGEN SATURATION: 99 % | DIASTOLIC BLOOD PRESSURE: 86 MMHG | TEMPERATURE: 99 F

## 2020-01-27 DIAGNOSIS — M54.42 CHRONIC LEFT-SIDED LOW BACK PAIN WITH LEFT-SIDED SCIATICA: ICD-10-CM

## 2020-01-27 DIAGNOSIS — J06.9 UPPER RESPIRATORY TRACT INFECTION, UNSPECIFIED TYPE: ICD-10-CM

## 2020-01-27 DIAGNOSIS — M77.11 LATERAL EPICONDYLITIS, RIGHT ELBOW: Primary | ICD-10-CM

## 2020-01-27 DIAGNOSIS — G89.29 CHRONIC LEFT-SIDED LOW BACK PAIN WITH LEFT-SIDED SCIATICA: ICD-10-CM

## 2020-01-27 DIAGNOSIS — M25.511 RIGHT SHOULDER PAIN, UNSPECIFIED CHRONICITY: ICD-10-CM

## 2020-01-27 PROCEDURE — 99214 PR OFFICE/OUTPT VISIT, EST, LEVL IV, 30-39 MIN: ICD-10-PCS | Mod: S$GLB,,, | Performed by: FAMILY MEDICINE

## 2020-01-27 PROCEDURE — 99999 PR PBB SHADOW E&M-EST. PATIENT-LVL III: ICD-10-PCS | Mod: PBBFAC,,, | Performed by: FAMILY MEDICINE

## 2020-01-27 PROCEDURE — 99214 OFFICE O/P EST MOD 30 MIN: CPT | Mod: S$GLB,,, | Performed by: FAMILY MEDICINE

## 2020-01-27 PROCEDURE — 99999 PR PBB SHADOW E&M-EST. PATIENT-LVL III: CPT | Mod: PBBFAC,,, | Performed by: FAMILY MEDICINE

## 2020-01-27 RX ORDER — METHYLPREDNISOLONE 4 MG/1
TABLET ORAL
Qty: 1 PACKAGE | Refills: 0 | Status: SHIPPED | OUTPATIENT
Start: 2020-01-27 | End: 2020-02-17

## 2020-01-27 RX ORDER — TRAMADOL HYDROCHLORIDE 50 MG/1
50 TABLET ORAL NIGHTLY
Qty: 14 TABLET | Refills: 0 | Status: SHIPPED | OUTPATIENT
Start: 2020-01-27 | End: 2020-01-28 | Stop reason: SDUPTHER

## 2020-01-27 NOTE — PROGRESS NOTES
Subjective:   Patient ID: Varinder Salazar is a 34 y.o. male.    Chief Complaint: Arm Pain; Nasal Congestion; Cough; Generalized Body Aches; and Sore Throat      Arm Pain    The incident occurred more than 1 week ago. There was no injury mechanism. The pain is present in the right shoulder and right elbow. The quality of the pain is described as aching. The pain is at a severity of 5/10. The pain is moderate. Pertinent negatives include no chest pain, muscle weakness, numbness or tingling. Nothing aggravates the symptoms. He has tried nothing for the symptoms.   Cough   This is a new problem. The current episode started 1 to 4 weeks ago. The problem has been gradually worsening. The cough is productive of sputum. Associated symptoms include chills, ear congestion, ear pain, a fever, headaches, myalgias, a sore throat and wheezing. Pertinent negatives include no chest pain, eye redness, postnasal drip, rash, rhinorrhea or shortness of breath. The symptoms are aggravated by dust and exercise. The treatment provided moderate relief.   Sore Throat    Associated symptoms include coughing, ear pain and headaches. Pertinent negatives include no abdominal pain, congestion, diarrhea, ear discharge, neck pain, shortness of breath, trouble swallowing or vomiting.   URI    Associated symptoms include coughing, ear pain, headaches, a sore throat and wheezing. Pertinent negatives include no abdominal pain, chest pain, congestion, diarrhea, dysuria, nausea, neck pain, rash, rhinorrhea, sneezing or vomiting.     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 He is aware this is not filed under workman's comp    Patient queried and denies any further complaints.      ALLERGIES AND MEDICATIONS: updated and reviewed.  Review of patient's allergies indicates:  No Known Allergies    Current Outpatient Medications:     methylPREDNISolone (MEDROL DOSEPACK) 4 mg tablet, use as directed, Disp: 1 Package, Rfl: 0    traMADol  "(ULTRAM) 50 mg tablet, Take 1 tablet (50 mg total) by mouth every evening. Prn severe pain M54.4 qty medically necessary qty, Disp: 7 tablet, Rfl: 0    Review of Systems   Constitutional: Positive for chills and fever. Negative for activity change, appetite change, diaphoresis, fatigue and unexpected weight change.   HENT: Positive for ear pain and sore throat. Negative for congestion, ear discharge, facial swelling, hearing loss, nosebleeds, postnasal drip, rhinorrhea, sinus pressure, sneezing, tinnitus, trouble swallowing and voice change.    Eyes: Negative for photophobia, pain, discharge, redness, itching and visual disturbance.   Respiratory: Positive for cough and wheezing. Negative for chest tightness and shortness of breath.    Cardiovascular: Negative for chest pain, palpitations and leg swelling.   Gastrointestinal: Negative for abdominal distention, abdominal pain, anal bleeding, blood in stool, constipation, diarrhea, nausea, rectal pain and vomiting.   Endocrine: Negative for cold intolerance, heat intolerance, polydipsia, polyphagia and polyuria.   Genitourinary: Negative for difficulty urinating, dysuria and flank pain.   Musculoskeletal: Positive for arthralgias, back pain and myalgias. Negative for joint swelling, neck pain and neck stiffness.   Skin: Negative for rash.   Neurological: Positive for headaches. Negative for dizziness, tingling, tremors, seizures, syncope, speech difficulty, weakness, light-headedness and numbness.   Psychiatric/Behavioral: Negative for behavioral problems, confusion, decreased concentration, dysphoric mood, sleep disturbance and suicidal ideas. The patient is not nervous/anxious and is not hyperactive.        Objective:     Vitals:    01/27/20 1357   BP: 120/86   Pulse: 92   Temp: 98.7 °F (37.1 °C)   TempSrc: Oral   SpO2: 99%   Weight: 85.4 kg (188 lb 4.4 oz)   Height: 5' 7" (1.702 m)   PainSc:   6   PainLoc: Arm     Body mass index is 29.49 kg/m².    Physical Exam "   Constitutional: He appears well-developed and well-nourished.   HENT:   Head: Normocephalic and atraumatic.   Right Ear: Hearing, tympanic membrane, external ear and ear canal normal. No drainage or swelling. No decreased hearing is noted.   Left Ear: Hearing, tympanic membrane, external ear and ear canal normal. No drainage or swelling. No decreased hearing is noted.   Nose: Nose normal. No rhinorrhea.   Mouth/Throat: Oropharynx is clear and moist. No oropharyngeal exudate, posterior oropharyngeal edema or posterior oropharyngeal erythema.   Eyes: Pupils are equal, round, and reactive to light. Conjunctivae, EOM and lids are normal. Right eye exhibits no discharge and no exudate. Left eye exhibits no discharge and no exudate. Right conjunctiva is not injected. Left conjunctiva is not injected.   Neck: Trachea normal and full passive range of motion without pain. Normal carotid pulses, no hepatojugular reflux and no JVD present. Carotid bruit is not present. No neck rigidity. No edema and no erythema present. No thyroid mass and no thyromegaly present.   Cardiovascular: Normal rate, regular rhythm and normal heart sounds.   Pulmonary/Chest: Effort normal. No respiratory distress.   Abdominal: Soft. Normal appearance and bowel sounds are normal. There is no tenderness. There is negative Stephen's sign.   Musculoskeletal:        Right shoulder: Normal.        Left shoulder: Normal.        Right elbow: Tenderness found. Lateral epicondyle tenderness noted.   Lymphadenopathy:     He has no cervical adenopathy.   Neurological: He is alert.   Skin: Skin is warm and dry.   Psychiatric: He has a normal mood and affect. His speech is normal and behavior is normal.   Nursing note and vitals reviewed.      Assessment and Plan:   Varinder was seen today for arm pain, nasal congestion, cough, generalized body aches and sore throat.    Diagnoses and all orders for this visit:    Lateral epicondylitis, right elbow    Chronic  left-sided low back pain with left-sided sciatica    Right shoulder pain, unspecified chronicity  URI  Hydrate, rest, OTC Mucinex Expectorant as directed, Nasal saline as needed.  OTC Zyrtec as directed.    -     methylPREDNISolone (MEDROL DOSEPACK) 4 mg tablet; use as directed    -     traMADol (ULTRAM) 50 mg tablet; Take 1 tablet (50 mg total) by mouth every evening. Prn severe pain M54.4 qty medically necessary qty        Follow up in about 2 weeks (around 2/10/2020), or if symptoms worsen or fail to improve.    THIS NOTE WILL BE SHARED WITH THE PATIENT.

## 2020-01-27 NOTE — TELEPHONE ENCOUNTER
Please see message and advise     ----- Message from Monica Burrows sent at 1/27/2020  4:30 PM CST -----  Contact: [phone]  Patient is requesting a call from the office regarding an excuse note for his visit today.  Also, patient wants to know if he should return to work tomorrow, if not not then he will need a excuse for both days.    Please advise, thank you.

## 2020-01-28 PROBLEM — E66.811 CLASS 1 OBESITY DUE TO EXCESS CALORIES WITHOUT SERIOUS COMORBIDITY WITH BODY MASS INDEX (BMI) OF 30.0 TO 30.9 IN ADULT: Status: RESOLVED | Noted: 2019-09-04 | Resolved: 2020-01-28

## 2020-01-28 PROBLEM — E66.09 CLASS 1 OBESITY DUE TO EXCESS CALORIES WITHOUT SERIOUS COMORBIDITY WITH BODY MASS INDEX (BMI) OF 30.0 TO 30.9 IN ADULT: Status: RESOLVED | Noted: 2019-09-04 | Resolved: 2020-01-28

## 2020-01-28 RX ORDER — TRAMADOL HYDROCHLORIDE 50 MG/1
50 TABLET ORAL NIGHTLY
Qty: 7 TABLET | Refills: 0 | Status: SHIPPED | OUTPATIENT
Start: 2020-01-28 | End: 2020-06-15

## 2020-01-28 RX ORDER — PROMETHAZINE HYDROCHLORIDE AND DEXTROMETHORPHAN HYDROBROMIDE 6.25; 15 MG/5ML; MG/5ML
5 SYRUP ORAL EVERY 4 HOURS PRN
Qty: 118 ML | Refills: 0 | Status: SHIPPED | OUTPATIENT
Start: 2020-01-28 | End: 2020-02-07

## 2020-01-28 NOTE — TELEPHONE ENCOUNTER
Patient states he will come to office to  work excuse. Patient states he was told he would be prescribed something for cough. Please advise.

## 2020-03-10 ENCOUNTER — OFFICE VISIT (OUTPATIENT)
Dept: PRIMARY CARE CLINIC | Facility: CLINIC | Age: 35
End: 2020-03-10
Payer: COMMERCIAL

## 2020-03-10 VITALS
WEIGHT: 186.75 LBS | DIASTOLIC BLOOD PRESSURE: 70 MMHG | OXYGEN SATURATION: 99 % | HEART RATE: 99 BPM | HEIGHT: 67 IN | TEMPERATURE: 99 F | BODY MASS INDEX: 29.31 KG/M2 | SYSTOLIC BLOOD PRESSURE: 128 MMHG

## 2020-03-10 DIAGNOSIS — G89.29 CHRONIC LEFT-SIDED LOW BACK PAIN WITH LEFT-SIDED SCIATICA: ICD-10-CM

## 2020-03-10 DIAGNOSIS — G89.29 CHRONIC MIDLINE LOW BACK PAIN WITHOUT SCIATICA: ICD-10-CM

## 2020-03-10 DIAGNOSIS — F19.90 MISUSE OF PRESCRIPTION ONLY DRUGS: ICD-10-CM

## 2020-03-10 DIAGNOSIS — M54.42 CHRONIC LEFT-SIDED LOW BACK PAIN WITH LEFT-SIDED SCIATICA: ICD-10-CM

## 2020-03-10 DIAGNOSIS — M54.50 CHRONIC MIDLINE LOW BACK PAIN WITHOUT SCIATICA: ICD-10-CM

## 2020-03-10 DIAGNOSIS — Z00.00 GENERAL MEDICAL EXAM: ICD-10-CM

## 2020-03-10 DIAGNOSIS — F19.10 SUBSTANCE ABUSE: Primary | ICD-10-CM

## 2020-03-10 PROCEDURE — 99999 PR PBB SHADOW E&M-EST. PATIENT-LVL IV: ICD-10-PCS | Mod: PBBFAC,,, | Performed by: FAMILY MEDICINE

## 2020-03-10 PROCEDURE — 99214 PR OFFICE/OUTPT VISIT, EST, LEVL IV, 30-39 MIN: ICD-10-PCS | Mod: S$GLB,,, | Performed by: FAMILY MEDICINE

## 2020-03-10 PROCEDURE — 99214 OFFICE O/P EST MOD 30 MIN: CPT | Mod: S$GLB,,, | Performed by: FAMILY MEDICINE

## 2020-03-10 PROCEDURE — 99999 PR PBB SHADOW E&M-EST. PATIENT-LVL IV: CPT | Mod: PBBFAC,,, | Performed by: FAMILY MEDICINE

## 2020-03-10 NOTE — PROGRESS NOTES
"Subjective:   Patient ID: Varinder Salazar is a 34 y.o. male.    Chief Complaint: Generalized Body Aches and Mental Health Problem      HPI  Pt comes in for generalized body aches. States a coworker in the past gave him a rx medication and he took it regularly but did not know what it was.  He states he took it for low back pain He states he did not know it was for approximately 2 years.  States he just recently learned it was "[place]." States he took it consistently since 2018. Stopped taking it last week.  I asked him why he never asked what he was taking.  His reply was, I did not care   [What it was] because it was working so good.     States he is having withdrawal symptoms and has body aches, confusion. States he is now taking tramadol daily and states it is a 200 mg tablet daily;  but moments before stated that he stopped taking any tramadol or other medicine known or unknown last week.     He received a prescription of tramadol 50 mg from me January 27, 2020 a quantity of 7 tablets.  He received a prescription for 12 tablets of tramadol 50 mg December 21, 2019 from a clinic in another [facility].    He is today asking for prescription for something to help with these withdrawals.    Patient queried and denies any further complaints.      ALLERGIES AND MEDICATIONS: updated and reviewed.  Review of patient's allergies indicates:  No Known Allergies    Current Outpatient Medications:     traMADol (ULTRAM) 50 mg tablet, Take 1 tablet (50 mg total) by mouth every evening. Prn severe pain M54.4 qty medically necessary qty (Patient not taking: Reported on 3/10/2020), Disp: 7 tablet, Rfl: 0    Review of Systems   Constitutional: Positive for fatigue. Negative for activity change, appetite change, chills, diaphoresis, fever and unexpected weight change.   HENT: Negative for congestion, ear discharge, ear pain, postnasal drip, rhinorrhea, sneezing and sore throat.    Eyes: Negative for photophobia and " "discharge.   Respiratory: Negative for cough, chest tightness, shortness of breath and wheezing.    Cardiovascular: Negative for chest pain and palpitations.   Gastrointestinal: Negative for abdominal distention, abdominal pain, diarrhea, nausea and vomiting.   Genitourinary: Negative for dysuria.   Musculoskeletal: Positive for back pain. Negative for arthralgias, myalgias and neck pain.   Skin: Negative for rash.   Neurological: Negative for headaches.       Objective:     Vitals:    03/10/20 1539   BP: 128/70   Pulse: 99   Temp: 98.6 °F (37 °C)   TempSrc: Oral   SpO2: 99%   Weight: 84.7 kg (186 lb 11.7 oz)   Height: 5' 7" (1.702 m)   PainSc:   4   PainLoc: Hip     Body mass index is 29.25 kg/m².    Physical Exam   Constitutional: He is oriented to person, place, and time. He appears well-developed and well-nourished.   HENT:   Head: Normocephalic and atraumatic.   Cardiovascular: Normal rate, regular rhythm and normal heart sounds.   Pulmonary/Chest: Effort normal and breath sounds normal.   Neurological: He is alert and oriented to person, place, and time.   Skin: Skin is warm and dry.   Psychiatric: He has a normal mood and affect. His behavior is normal.   Nursing note and vitals reviewed.      Assessment and Plan:   Varinder was seen today for generalized body aches and mental health problem.    Diagnoses and all orders for this visit:    Substance abuse  -     Ambulatory referral/consult to Psychiatry; Future    Misuse of prescription only drugs    Chronic midline low back pain without sciatica  -     Ambulatory referral/consult to [facility]; Future    General medical exam  -     CBC auto differential; Future  -     Comprehensive metabolic panel; Future  -     Hemoglobin A1c; Future  -     TSH; Future    Chronic left-sided low back pain with left-sided sciatica      See HPI regarding patient's reports.  Check labs.  Psychiatry referral.  No opioid will be prescribed to this patient who requests such " or Suboxone.  No follow-ups on file.    THIS NOTE WILL BE SHARED WITH THE PATIENT.

## 2020-03-11 ENCOUNTER — LAB VISIT (OUTPATIENT)
Dept: LAB | Facility: HOSPITAL | Age: 35
End: 2020-03-11
Attending: FAMILY MEDICINE
Payer: COMMERCIAL

## 2020-03-11 DIAGNOSIS — Z00.00 GENERAL MEDICAL EXAM: ICD-10-CM

## 2020-03-11 LAB
BASOPHILS # BLD AUTO: 0.04 K/UL (ref 0–0.2)
BASOPHILS NFR BLD: 0.3 % (ref 0–1.9)
DIFFERENTIAL METHOD: ABNORMAL
EOSINOPHIL # BLD AUTO: 0.1 K/UL (ref 0–0.5)
EOSINOPHIL NFR BLD: 0.8 % (ref 0–8)
ERYTHROCYTE [DISTWIDTH] IN BLOOD BY AUTOMATED COUNT: 13 % (ref 11.5–14.5)
HCT VFR BLD AUTO: 46.2 % (ref 40–54)
HGB BLD-MCNC: 14.4 G/DL (ref 14–18)
IMM GRANULOCYTES # BLD AUTO: 0.03 K/UL (ref 0–0.04)
IMM GRANULOCYTES NFR BLD AUTO: 0.2 % (ref 0–0.5)
LYMPHOCYTES # BLD AUTO: 2.4 K/UL (ref 1–4.8)
LYMPHOCYTES NFR BLD: 19.2 % (ref 18–48)
MCH RBC QN AUTO: 28.3 PG (ref 27–31)
MCHC RBC AUTO-ENTMCNC: 31.2 G/DL (ref 32–36)
MCV RBC AUTO: 91 FL (ref 82–98)
MONOCYTES # BLD AUTO: 0.5 K/UL (ref 0.3–1)
MONOCYTES NFR BLD: 4.3 % (ref 4–15)
NEUTROPHILS # BLD AUTO: 9.4 K/UL (ref 1.8–7.7)
NEUTROPHILS NFR BLD: 75.2 % (ref 38–73)
NRBC BLD-RTO: 0 /100 WBC
PLATELET # BLD AUTO: 357 K/UL (ref 150–350)
PMV BLD AUTO: 10.6 FL (ref 9.2–12.9)
RBC # BLD AUTO: 5.09 M/UL (ref 4.6–6.2)
WBC # BLD AUTO: 12.48 K/UL (ref 3.9–12.7)

## 2020-03-11 PROCEDURE — 84443 ASSAY THYROID STIM HORMONE: CPT

## 2020-03-11 PROCEDURE — 36415 COLL VENOUS BLD VENIPUNCTURE: CPT | Mod: PN

## 2020-03-11 PROCEDURE — 85025 COMPLETE CBC W/AUTO DIFF WBC: CPT

## 2020-03-11 PROCEDURE — 80053 COMPREHEN METABOLIC PANEL: CPT

## 2020-03-11 PROCEDURE — 83036 HEMOGLOBIN GLYCOSYLATED A1C: CPT

## 2020-03-12 ENCOUNTER — TELEPHONE (OUTPATIENT)
Dept: PRIMARY CARE CLINIC | Facility: CLINIC | Age: 35
End: 2020-03-12

## 2020-03-12 LAB
ALBUMIN SERPL BCP-MCNC: 4.5 G/DL (ref 3.5–5.2)
ALP SERPL-CCNC: 62 U/L (ref 55–135)
ALT SERPL W/O P-5'-P-CCNC: 23 U/L (ref 10–44)
ANION GAP SERPL CALC-SCNC: 10 MMOL/L (ref 8–16)
AST SERPL-CCNC: 20 U/L (ref 10–40)
BILIRUB SERPL-MCNC: 0.4 MG/DL (ref 0.1–1)
BUN SERPL-MCNC: 7 MG/DL (ref 6–20)
CALCIUM SERPL-MCNC: 10 MG/DL (ref 8.7–10.5)
CHLORIDE SERPL-SCNC: 106 MMOL/L (ref 95–110)
CO2 SERPL-SCNC: 27 MMOL/L (ref 23–29)
CREAT SERPL-MCNC: 0.8 MG/DL (ref 0.5–1.4)
EST. GFR  (AFRICAN AMERICAN): >60 ML/MIN/1.73 M^2
EST. GFR  (NON AFRICAN AMERICAN): >60 ML/MIN/1.73 M^2
ESTIMATED AVG GLUCOSE: 114 MG/DL (ref 68–131)
GLUCOSE SERPL-MCNC: 95 MG/DL (ref 70–110)
HBA1C MFR BLD HPLC: 5.6 % (ref 4–5.6)
POTASSIUM SERPL-SCNC: 4.1 MMOL/L (ref 3.5–5.1)
PROT SERPL-MCNC: 7.6 G/DL (ref 6–8.4)
SODIUM SERPL-SCNC: 143 MMOL/L (ref 136–145)
TSH SERPL DL<=0.005 MIU/L-ACNC: 0.55 UIU/ML (ref 0.4–4)

## 2020-03-12 NOTE — TELEPHONE ENCOUNTER
----- Message from Jeremi Ruiz MD sent at 3/12/2020  7:21 AM CDT -----  No concerning findings on the lab.  Platelets very mildly elevated which is not uncommon and not of concern.  Follow-up with Psychiatry as discussed

## 2020-03-16 PROBLEM — M54.50 CHRONIC MIDLINE LOW BACK PAIN WITHOUT SCIATICA: Status: ACTIVE | Noted: 2020-03-16

## 2020-03-16 PROBLEM — G89.29 CHRONIC MIDLINE LOW BACK PAIN WITHOUT SCIATICA: Status: ACTIVE | Noted: 2020-03-16

## 2020-03-16 PROBLEM — N52.8 OTHER MALE ERECTILE DYSFUNCTION: Status: RESOLVED | Noted: 2019-07-24 | Resolved: 2020-03-16

## 2020-03-18 PROBLEM — F19.90 MISUSE OF PRESCRIPTION ONLY DRUGS: Status: ACTIVE | Noted: 2020-03-18

## 2020-06-08 ENCOUNTER — TELEPHONE (OUTPATIENT)
Dept: PRIMARY CARE CLINIC | Facility: CLINIC | Age: 35
End: 2020-06-08

## 2020-06-08 ENCOUNTER — HOSPITAL ENCOUNTER (EMERGENCY)
Facility: OTHER | Age: 35
Discharge: HOME | End: 2020-06-08
Attending: EMERGENCY MEDICINE
Payer: MEDICAID

## 2020-06-08 VITALS
DIASTOLIC BLOOD PRESSURE: 86 MMHG | WEIGHT: 200 LBS | SYSTOLIC BLOOD PRESSURE: 127 MMHG | RESPIRATION RATE: 16 BRPM | BODY MASS INDEX: 31.39 KG/M2 | HEART RATE: 78 BPM | TEMPERATURE: 99 F | OXYGEN SATURATION: 100 % | HEIGHT: 67 IN

## 2020-06-08 DIAGNOSIS — N50.82 SCROTAL PAIN: ICD-10-CM

## 2020-06-08 DIAGNOSIS — M79.605 LEG PAIN, MEDIAL, LEFT: Primary | ICD-10-CM

## 2020-06-08 LAB
ALBUMIN SERPL BCP-MCNC: 4.3 G/DL (ref 3.5–5.2)
ALP SERPL-CCNC: 73 U/L (ref 55–135)
ALT SERPL W/O P-5'-P-CCNC: 24 U/L (ref 10–44)
ANION GAP SERPL CALC-SCNC: 10 MMOL/L (ref 8–16)
AST SERPL-CCNC: 20 U/L (ref 10–40)
BASOPHILS # BLD AUTO: 0.04 K/UL (ref 0–0.2)
BASOPHILS NFR BLD: 0.5 % (ref 0–1.9)
BILIRUB SERPL-MCNC: 0.3 MG/DL (ref 0.1–1)
BILIRUB UR QL STRIP: NEGATIVE
BUN SERPL-MCNC: 7 MG/DL (ref 6–20)
CALCIUM SERPL-MCNC: 9.4 MG/DL (ref 8.7–10.5)
CHLORIDE SERPL-SCNC: 107 MMOL/L (ref 95–110)
CK SERPL-CCNC: 193 U/L (ref 20–200)
CLARITY UR: CLEAR
CO2 SERPL-SCNC: 25 MMOL/L (ref 23–29)
COLOR UR: YELLOW
CREAT SERPL-MCNC: 0.9 MG/DL (ref 0.5–1.4)
DIFFERENTIAL METHOD: NORMAL
EOSINOPHIL # BLD AUTO: 0.2 K/UL (ref 0–0.5)
EOSINOPHIL NFR BLD: 2.4 % (ref 0–8)
ERYTHROCYTE [DISTWIDTH] IN BLOOD BY AUTOMATED COUNT: 13.2 % (ref 11.5–14.5)
EST. GFR  (AFRICAN AMERICAN): >60 ML/MIN/1.73 M^2
EST. GFR  (NON AFRICAN AMERICAN): >60 ML/MIN/1.73 M^2
GLUCOSE SERPL-MCNC: 96 MG/DL (ref 70–110)
GLUCOSE UR QL STRIP: NEGATIVE
HCT VFR BLD AUTO: 46.9 % (ref 40–54)
HGB BLD-MCNC: 15 G/DL (ref 14–18)
HGB UR QL STRIP: ABNORMAL
IMM GRANULOCYTES # BLD AUTO: 0.02 K/UL (ref 0–0.04)
IMM GRANULOCYTES NFR BLD AUTO: 0.2 % (ref 0–0.5)
KETONES UR QL STRIP: NEGATIVE
LEUKOCYTE ESTERASE UR QL STRIP: NEGATIVE
LYMPHOCYTES # BLD AUTO: 2 K/UL (ref 1–4.8)
LYMPHOCYTES NFR BLD: 23.9 % (ref 18–48)
MCH RBC QN AUTO: 28.5 PG (ref 27–31)
MCHC RBC AUTO-ENTMCNC: 32 G/DL (ref 32–36)
MCV RBC AUTO: 89 FL (ref 82–98)
MONOCYTES # BLD AUTO: 0.4 K/UL (ref 0.3–1)
MONOCYTES NFR BLD: 5 % (ref 4–15)
NEUTROPHILS # BLD AUTO: 5.7 K/UL (ref 1.8–7.7)
NEUTROPHILS NFR BLD: 68 % (ref 38–73)
NITRITE UR QL STRIP: NEGATIVE
NRBC BLD-RTO: 0 /100 WBC
PH UR STRIP: 6 [PH] (ref 5–8)
PLATELET # BLD AUTO: 347 K/UL (ref 150–350)
PMV BLD AUTO: 9.7 FL (ref 9.2–12.9)
POTASSIUM SERPL-SCNC: 4.7 MMOL/L (ref 3.5–5.1)
PROT SERPL-MCNC: 7.4 G/DL (ref 6–8.4)
PROT UR QL STRIP: NEGATIVE
RBC # BLD AUTO: 5.27 M/UL (ref 4.6–6.2)
SODIUM SERPL-SCNC: 142 MMOL/L (ref 136–145)
SP GR UR STRIP: >=1.03 (ref 1–1.03)
URN SPEC COLLECT METH UR: ABNORMAL
UROBILINOGEN UR STRIP-ACNC: NEGATIVE EU/DL
WBC # BLD AUTO: 8.37 K/UL (ref 3.9–12.7)

## 2020-06-08 PROCEDURE — 81003 URINALYSIS AUTO W/O SCOPE: CPT

## 2020-06-08 PROCEDURE — 85025 COMPLETE CBC W/AUTO DIFF WBC: CPT

## 2020-06-08 PROCEDURE — 25000003 PHARM REV CODE 250: Performed by: EMERGENCY MEDICINE

## 2020-06-08 PROCEDURE — 82550 ASSAY OF CK (CPK): CPT

## 2020-06-08 PROCEDURE — 80053 COMPREHEN METABOLIC PANEL: CPT

## 2020-06-08 PROCEDURE — 99284 EMERGENCY DEPT VISIT MOD MDM: CPT | Mod: 25

## 2020-06-08 RX ORDER — ORPHENADRINE CITRATE 30 MG/ML
60 INJECTION INTRAMUSCULAR; INTRAVENOUS
Status: DISCONTINUED | OUTPATIENT
Start: 2020-06-08 | End: 2020-06-08 | Stop reason: HOSPADM

## 2020-06-08 RX ORDER — LIDOCAINE 50 MG/G
PATCH TOPICAL
Qty: 30 PATCH | Refills: 0 | Status: SHIPPED | OUTPATIENT
Start: 2020-06-08 | End: 2020-07-02

## 2020-06-08 RX ORDER — OMEPRAZOLE 20 MG/1
40 CAPSULE, DELAYED RELEASE ORAL DAILY
Qty: 60 CAPSULE | Refills: 0 | Status: SHIPPED | OUTPATIENT
Start: 2020-06-08 | End: 2020-07-02

## 2020-06-08 RX ORDER — NAPROXEN 500 MG/1
500 TABLET ORAL
Status: COMPLETED | OUTPATIENT
Start: 2020-06-08 | End: 2020-06-08

## 2020-06-08 RX ORDER — METHOCARBAMOL 750 MG/1
1500 TABLET, FILM COATED ORAL 3 TIMES DAILY PRN
Qty: 30 TABLET | Refills: 0 | Status: SHIPPED | OUTPATIENT
Start: 2020-06-08 | End: 2020-06-13

## 2020-06-08 RX ORDER — NAPROXEN 500 MG/1
500 TABLET ORAL 2 TIMES DAILY PRN
Qty: 60 TABLET | Refills: 0 | Status: SHIPPED | OUTPATIENT
Start: 2020-06-08 | End: 2020-07-02

## 2020-06-08 RX ADMIN — NAPROXEN 500 MG: 500 TABLET ORAL at 11:06

## 2020-06-08 NOTE — TELEPHONE ENCOUNTER
----- Message from Jeremi Ruiz MD sent at 6/8/2020  2:24 PM CDT -----  Patient just set up a virtual visit for 3:00 p.m. today for trace blood in a urinalysis during a ER visit only this morning.  First, dipstick urinalyses often test falsely positive for blood.  Second, it is much too soon to do anything at this point even if it is truly positive.  Contact us later this week for a urine microscopic study.  Thank you.

## 2020-06-08 NOTE — TELEPHONE ENCOUNTER
Spoke with patient, seen in ER today and informed he should f/u with PCP later this week for blood in urine.  Appointment scheduled Thursday with Dr. Wood.

## 2020-06-08 NOTE — ED NOTES
L groin, L testicle, L inner thigh pain x 2 days after lifting heavy furniture. Went to Urgent care PTA, sent here for further eval. Denies urinary symptoms, testicular swelling or fevers, chills. Pt AAOx4 and appropriate at this time. Respirations even and unlabored. No acute distress noted. Ambulates with steady gait.

## 2020-06-08 NOTE — DISCHARGE INSTRUCTIONS
Call your primary care doctor to make the first available appointment.     Keep all your medical appointments.     Take your regular medication as prescribed. Contact your primary care provider before running out of medication, or for any problems obtaining them.    Do not drive or operate heavy machinery while taking opioid or muscle relaxing medications. Examples include norco, percocet, xanax, valium, flexeril.     Overuse or long term use of pain and sedating medication may lead to addiction, dependence, organ failure, family and work problems, legal problems, accidental overdose and death.    If you do not have health insurance, you probably can afford it:  Call 1-110.329.1463 ([facility] hotline) or go to www.[employer].la.gov    Your evaluation in the ED does not suggest any emergent or life threatening medical condition requiring admission or immediate intervention beyond that provided in the ED.   However, the signs of a serious problem sometimes take more time to appear.   RETURN TO THE ER if any of the following occur:    Weakness, dizziness, fainting, or loss of consciousness   Fever of 100.4ºF (38ºC) or higher  Any worse symptoms  Any new or concerning symptoms      Free COVID19 testing:   No cost. No health insurance required. 18+. Walk-up.   [facility] Mobile Testing: Anyone is eligible to receive a test. No ID required. Every day 8am until 1:30 or 2. Locations vary. Call 211 or go to ready.jeanine.gov.   [facility]: Must have symptoms (fever, cough, sore throat, breathing troubles, loss of taste/smell, stomach symptoms, etc.). [address], [phone], [email or web address]. Mon-Fri 830am-4pm. Sat 830am-11am.     If you need food assistance:  [employer] [place]:   Why Hunger Hotline ([place]/EN): text your zip code to receive a list of food pantries near you: [phone]  [employer] [place]: Saturdays 9am-12pm at [address].  No ID or paperwork required.  [employer] [place]: Locations vary  weekly. Call 211 for information about second harvest or visit www.ready.ajit.gov ([place]/EN). No ID or paperwork required. Your windows do not need to be opened. [employer] may be present.     Food Delivery:  [employer] ([place]):  Delivering on Wednesdays.  Call [phone] or text 439-754-0843  [employer]: ([place]): Delivering groceries and supplies to immigrant families Monday through Friday 9am-4pm. [phone].  [employer] ([place]): Delivering groceries for those 60+ and/or immunocompromised. 101.983.2035 or [email or web address]    Legal Services:  [facility] Legal Service: Free legal services for low income individuals. Includes problems with rent or eviction. [phone]  [facility]: Guidance for those experiencing family violence, child abuse, and sexual assault. [phone]    Unemployment:  Those who have lost all or some of their work are eligible for state unemployment ($247 weekly before tax). They may also be eligible for federal Pandemic Unemployment Assistance ($600 weekly before tax). This includes independent contractors, gig workers, and those unemployed pre-COVID.   [place] Unemployment Hotline: Mon-Fri 830am-430pm. 916.574.4072, [phone], and [phone]. Due to long phone hold times, we recommend applying online at www.Pathgather.net. If you need help with internet access for the application, call [phone].

## 2020-06-08 NOTE — ED PROVIDER NOTES
"Encounter Date: 6/8/2020    SCRIBE #1 NOTE: I, Josias Doe, am scribing for, and in the presence of, Dr. Pandey.       History     Chief Complaint   Patient presents with    Leg Pain     pt with  lower back pain and groin pain and leg pain x2 . pt sent over from urgent care.     Time seen by provider: 10:36 AM     This is a 34 y.o. male who presents with complaint of left inner thigh pain, left testicular pain, and lower back pain after "pulling a muscle" in that area moving furniture two days ago. Pt was sent here from urgent care. Pt reports the pain worsened today while he was walking in the grocery store, prompting him to go urgent care. Pt reports a hx of "pulling muscle" in that area. He states specific movements worsen pain. He has used muscle relaxers and 800 mg of Ibuprofen for his pain that only helped for 1 hour. He denies penile discharge.    The history is provided by the patient and medical records.     Review of patient's allergies indicates:  No Known Allergies  Past Medical History:   Diagnosis Date    Asthma     Depression     Lateral epicondylitis 8/23/2013    Other male erectile dysfunction 7/24/2019     History reviewed. No pertinent surgical history.  Family History   Problem Relation Age of Onset    Migraines Mother     Diabetes Father     Heart disease Father      Social History     Tobacco Use    Smoking status: Current Every Day Smoker   Substance Use Topics    Alcohol use: Yes     Comment: social     Drug use: Yes     Types: Marijuana     Review of Systems  ROS: As per HPI and below:   General: No fever.   HENT: No facial pain.   Eyes: Negative for eye pain.   Cardiovascular: No chest pain.   Respiratory:  No dyspnea.   GI: No abdominal pain. No nausea. No vomiting. No diarrhea.   : Notes testicular pain. No penile discharge.  Skin: No rashes.   Neuro:  No syncope.  No focal deficits.   Musculoskeletal: Notes extremity pain.Notes back pain.  All other systems reviewed and are " negative.    Physical Exam     Initial Vitals [06/08/20 0938]   BP Pulse Resp Temp SpO2   117/79 92 18 98.6 °F (37 °C) 100 %      MAP       --         Physical Exam  Nursing note and vitals reviewed.  Constitutional: AAOx3. Well-developed and well-nourished. No distress.  HENT:   Mouth/Throat: Oropharynx is clear and moist.  Eyes: Pupils are equal, round, and reactive to light. No discharge. Anicteric.  Neck: Normal range of motion. Neck supple. No midline spinal tenderness.  Cardiovascular: Normal rate.  Pulmonary/Chest: Effort normal.  Abdominal: Soft. Bowel sounds normal. No distension and no mass. There is no tenderness. There is no rebound, no guarding.  Genitourinary: Penis normal. Cremasteric reflex is present. Right testis shows no mass, no swelling and no tenderness. Left testis shows scrotal tenderness, no mass, no swelling. No phimosis, paraphimosis or penile erythema. No discharge found.  Exam chaperoned by tech or RN.   Musculoskeletal: Left lumbar paraspinal tenderness. Full range of motion of hip, knee, and ankle. Reproducible tenderness along medial left upper leg. No stepoffs or deformities.  Neurological: Alert and oriented to person, place, and time. No gross cranial nerve deficit. Coordination normal. No UE/LE light  touch or strength deficits. Able to do deep knee bend, stand on heels and toes. Normal gait.   Skin: Skin is warm and dry.   Ext: 2+ radial pulses  Psychiatric: Behavior is normal. Judgment normal.    ED Course   Procedures  Labs Reviewed   URINALYSIS, REFLEX TO URINE CULTURE - Abnormal; Notable for the following components:       Result Value    Specific Gravity, UA >=1.030 (*)     Occult Blood UA Trace (*)     All other components within normal limits    Narrative:     Preferred Collection Type->Urine, Clean Catch   CBC W/ AUTO DIFFERENTIAL   COMPREHENSIVE METABOLIC PANEL   CK          Imaging Results          US Scrotum And Testicles (Final result)  Result time 06/08/20 13:13:13     Final result by Isaac Cohen MD (06/08/20 13:13:13)                 Impression:      No evidence for abnormal testicular masses or testicular torsion.    Small bilateral hydroceles.      Electronically signed by: Isaac Cohen MD  Date:    06/08/2020  Time:    13:13             Narrative:    EXAMINATION:  US SCROTUM AND TESTICLES    CLINICAL HISTORY:  Scrotal pain    TECHNIQUE:  Sonography of the scrotum and testes.    COMPARISON:  None.    FINDINGS:  Right Testicle:    *Size: 4.2 x 2.5 x 2.6 cm  *Appearance: Normal.  *Flow: Normal arterial and venous flow  *Epididymis: 0.2 cm epididymal cyst versus spermatocele within the head of the right epididymis.  *Hydrocele: Small.  *Varicocele: None.  .    Left Testicle:    *Size: 4.6 x 2.5 x 2.8 cm  *Appearance: Normal.  *Flow: Normal arterial and venous flow  *Epididymis: Normal.  *Hydrocele: Small.  *Varicocele: None.  .    Other findings: None.                               US Kidney (Final result)  Result time 06/08/20 13:05:38    Final result by Moe Cormier MD (06/08/20 13:05:38)                 Impression:      No significant sonographic abnormality.      Electronically signed by: Moe Cormier MD  Date:    06/08/2020  Time:    13:05             Narrative:    EXAMINATION:  US KIDNEY    CLINICAL HISTORY:  eval for obstructive ureterolithiasis, please evaluate bladder jets;    TECHNIQUE:  Ultrasound of the kidneys was performed including color flow and Doppler evaluation of the kidneys.    COMPARISON:  CT abdomen and pelvis 09/19/2013    FINDINGS:  Right kidney: The right kidney measures 10.2 cm. No cortical thinning. No loss of corticomedullary distinction. Resistive index measures 0.70.  No mass. No renal stone. No hydronephrosis.    Left kidney: The left kidney measures 11.1 cm. No cortical thinning. No loss of corticomedullary distinction. Resistive index measures 0.60.  No mass. No renal stone. No hydronephrosis.                                 Medical  Decision Making:   History:   Old Medical Records: I decided to obtain old medical records.  Clinical Tests:   Lab Tests: Ordered and Reviewed  Radiological Study: Ordered and Reviewed            Scribe Attestation:   Scribe #1: I performed the above scribed service and the documentation accurately describes the services I performed. I attest to the accuracy of the note.    Attending Attestation:           Physician Attestation for Scribe:  Physician Attestation Statement for Scribe #1: I, Dr. Pandey, reviewed documentation, as scribed by Josias Doe in my presence, and it is both accurate and complete.         Attending ED Notes:   Pt is a 34 y.o. M who presents with left lower back, left leg, left groin pain, as well as left sccrotal pain after moving a large amount of furniture several days ago. No associated fevers, chills, dysuria, hematuria, discharge.   On exam, pt with left scrotal tenderness, medial thigh and groin tenderness, no  lesions or lymphadenopathy, normal neurologic exam. .   Ddx included MSK strain and spasm, UTI, nephrolithiasis, torsion.     I independently interpreted and reviewed the patient's pt's scrotal and renal ultrasounds, notable for no acute process.    I discussed with patient and/or guardian/caretaker that this evaluation in the ED does not suggest any emergent or life threatening medical condition requiring admission or immediate intervention beyond what was provided in the ED. Regardless, an unremarkable evaluation in the ED does not preclude the development or presence of a serious or life threatening condition. As such, patient was instructed to seek medical assistance for any worsening or change in current symptoms.     I had a detailed discussion with patient  and/or guardian/caretaker regarding findings, plan, return precautions, importance of medication adherence, need to follow-up with a PCP for recheck of his hematuria. All questions answered.     Management decisions  for this encounter made amidst early acute phase of COVID19 public health emergency; emergency medicine workup standards and admission vs. discharge standards have necessarily shifted.     Note was created using voice recognition software. It may have occasional typographical errors not identified and edited despite initial review prior to signing.                ED Course as of Jun 08 1704   Mon Jun 08, 2020   1224 I independently reviewed and interpreted labs which are unremarkable / unrevealing.       [MN]      ED Course User Index  [MN] Josias Doe                Clinical Impression:     1. Leg pain, medial, left    2. Scrotal pain                ED Disposition Condition    Discharge Good        ED Prescriptions     Medication Sig Dispense Start Date End Date Auth. Provider    naproxen (NAPROSYN) 500 MG tablet Take 1 tablet (500 mg total) by mouth 2 (two) times daily as needed (pain). 60 tablet 6/8/2020  Sukumar Pandey MD    omeprazole (PRILOSEC) 20 MG capsule Take 2 capsules (40 mg total) by mouth once daily. 60 capsule 6/8/2020 7/8/2020 Sukumar Pandey MD    methocarbamoL (ROBAXIN) 750 MG Tab Take 2 tablets (1,500 mg total) by mouth 3 (three) times daily as needed (Muscle spasm pain). 30 tablet 6/8/2020 6/13/2020 Sukumar Pandey MD    lidocaine (LIDODERM) 5 % Apply to affected area as needed for pain for 12 hours, then off for 12 hours. Discard after each use.  May use 4% lidocaine patch as alternative. 30 patch 6/8/2020  Sukumar Pandey MD        Follow-up Information     Follow up With Specialties Details Why Contact Info    Jeremi Ruiz MD Family Medicine Schedule an appointment as soon as possible for a visit in 2 days  [address] [facility]  [address]TH FLO[address]  [facility] [address]  [phone]                                       Sukumar Pandey MD  06/08/20 8516

## 2020-06-09 DIAGNOSIS — R31.9 HEMATURIA, UNSPECIFIED TYPE: Primary | ICD-10-CM

## 2020-06-09 DIAGNOSIS — R82.90 ABNORMAL URINALYSIS: ICD-10-CM

## 2020-06-11 ENCOUNTER — OFFICE VISIT (OUTPATIENT)
Dept: PRIMARY CARE CLINIC | Facility: CLINIC | Age: 35
End: 2020-06-11
Payer: MEDICAID

## 2020-06-11 VITALS
SYSTOLIC BLOOD PRESSURE: 114 MMHG | HEIGHT: 67 IN | WEIGHT: 194.25 LBS | DIASTOLIC BLOOD PRESSURE: 78 MMHG | TEMPERATURE: 98 F | HEART RATE: 88 BPM | OXYGEN SATURATION: 99 % | BODY MASS INDEX: 30.49 KG/M2

## 2020-06-11 DIAGNOSIS — R31.21 ASYMPTOMATIC MICROSCOPIC HEMATURIA: ICD-10-CM

## 2020-06-11 DIAGNOSIS — M54.50 CHRONIC MIDLINE LOW BACK PAIN WITHOUT SCIATICA: ICD-10-CM

## 2020-06-11 DIAGNOSIS — G89.29 CHRONIC MIDLINE LOW BACK PAIN WITHOUT SCIATICA: ICD-10-CM

## 2020-06-11 DIAGNOSIS — K40.90 LEFT INGUINAL HERNIA: Primary | ICD-10-CM

## 2020-06-11 PROCEDURE — 99213 PR OFFICE/OUTPT VISIT, EST, LEVL III, 20-29 MIN: ICD-10-PCS | Mod: S$PBB,,, | Performed by: FAMILY MEDICINE

## 2020-06-11 PROCEDURE — 99214 OFFICE O/P EST MOD 30 MIN: CPT | Mod: PBBFAC,PN | Performed by: FAMILY MEDICINE

## 2020-06-11 PROCEDURE — 99213 OFFICE O/P EST LOW 20 MIN: CPT | Mod: S$PBB,,, | Performed by: FAMILY MEDICINE

## 2020-06-11 PROCEDURE — 99999 PR PBB SHADOW E&M-EST. PATIENT-LVL IV: ICD-10-PCS | Mod: PBBFAC,,, | Performed by: FAMILY MEDICINE

## 2020-06-11 PROCEDURE — 99999 PR PBB SHADOW E&M-EST. PATIENT-LVL IV: CPT | Mod: PBBFAC,,, | Performed by: FAMILY MEDICINE

## 2020-06-11 NOTE — PATIENT INSTRUCTIONS
Hernia (Adult)    A hernia can happen when there is a weakness or defect in the wall of the abdomen or groin. Intestines or nearby tissues may move from their usual location and push through the weakness in the wall. This can cause a hernia (bulge) you may see or feel.  Causes and Risk Factors   A hernia may be present at birth. Or it may be caused by the wear and tear of daily living. Certain factors can make a hernia more likely. These can include:  · Heavy lifting  · Straining, whether from lifting, movement, or constipation  · Chronic cough  · Injury to the abdominal wall  · Excess weight  · Pregnancy  · Prior surgery  · Older age  · Family history of hernia  Symptoms  Symptoms of a hernia may come on suddenly. Or they may appear slowly over time. Some common symptoms include:  · Bulge in the groin area, around the navel, or in the scrotum (the bulge may get bigger when you stand and go away when you lie down)  · Pain or pressure around the bulge  · Pain during activities such as lifting, coughing, or sneezing  · A feeling of weakness or pressure in the groin  · Pain or swelling in the scrotum  Types of hernias  There are different types of hernia. The type you have depends on its location:  · Inguinal: This type is in the groin or scrotum. It is more common in men.  · Femoral: This type is in the groin, upper thigh (where the leg bends), or labia. It is more common in women.  · Ventral: This type is in the abdominal wall.  · Umbilical: This type occurs around the navel (belly button).  · Incisional: This type occurs at the site of a previous surgery.  The condition of the hernia can help determine how urgently it needs to be treated.  · Reducible: It goes back in by itself, or it can be pushed back in.  · Irreducible: It cant be pushed back in.  · Incarcerated/Strangulated: The intestine is trapped (incarcerated). If this happens, you wont be able to push the bulge back in. If the incarcerated hernia isnt  treated, it may become strangulated. This means the area loses blood supply and the tissue may die. This requires emergency surgery! Treatment is needed right away!  In most cases, a hernia will not heal on its own. Surgery is usually needed to repair the defect in the abdominal wall or groin. Youll be told more about surgery, if needed.  If your symptoms are not severe, treatment may sometimes be delayed. In such cases, regular follow-up visits with the provider will be needed. Youll be asked to keep track of your symptoms and to watch for signs of more serious problems. You may also be given guidelines similar to the home care instructions below.  Home Care  To help keep a hernia from getting worse, you may be advised to:  · Avoid heavy lifting and straining as directed.  · Take steps to prevent constipation, such as eating more fiber and drinking more water. This may help reduce straining that can occur when having a bowel movement. Reducing straining may help keep your symptoms from getting worse.  · Maintain a healthy weight or lose excess weight. This can help reduce strain on abdominal muscles and tissues.  · Stop smoking. This can help prevent coughing that may also strain abdominal muscles and tissues.  Follow-up care  Follow up with your healthcare provider, or as directed. If imaging tests were done, they will be reviewed a doctor. You will be told the results and any new findings that may affect your care.  When to seek medical advice  Call your healthcare provider right away if any of these occur:  · Hernia hardens, swells, or grows larger  · Hernia can no longer be pushed back in  · Pain moves to the lower right abdomen (just below the waistline), or spreads to the back  Call 911  Call 911 right away if any of these occur:  · Nausea and vomiting  · Severe pain, redness, or tenderness in the area near the hernia  · Pain worsens quickly and doesnt get better  · Inability to have a bowel movement or  pass gas  · Fever of 100.4°F (38°C) or higher  · Trouble breathing  · Fainting  · Rapid heart rate  · Vomiting blood  · Large amounts of blood in stool  Date Last Reviewed: 6/9/2015  © 5495-2420 [employer]. [address] [place] [address], [place] [address]. All rights reserved. This information is not intended as a substitute for professional medical care. Always follow your healthcare professional's instructions.

## 2020-06-11 NOTE — PROGRESS NOTES
Subjective:       Patient ID: Varinder Salazar is a 34 y.o. male.    Chief Complaint: Transitional Care (ER follow up)    35 yo male presents post ED visit for follow up    He went to the ED on 6/8/2020 for left groin strain/sprain after moving furniture. He has a history of chronic lower back pain and most recently has noticed a painful bulge in left groin.    He reports some improvement in muscle pain in left thigh/ groin but states that the intermittent bulge in left groin causes him severe fleeting pain. Denies sphincter incontinence, skin changes, weakness or paralysis of extremities.  No change in bowel movement.    He had blood on analysis of urine. No personal or family history of genitourinary malignancy. He is on nicotine replacement therapy to assist with smoking cessation.    The following portions of the patient's history were reviewed and updated as appropriate: allergies, current medications, past medical history, past social history, and problem list.    Review of Systems   Constitutional: Negative for activity change, appetite change, chills, diaphoresis, fatigue, fever and unexpected weight change.   Respiratory: Negative for apnea, cough, choking, chest tightness, shortness of breath, wheezing and stridor.    Cardiovascular: Negative for chest pain, palpitations and leg swelling.   Gastrointestinal: Negative for abdominal pain, constipation, diarrhea, nausea and vomiting.   Genitourinary: Negative for difficulty urinating, dysuria, enuresis, flank pain, frequency and hematuria (no gross blood in urine).   Musculoskeletal: Positive for back pain and myalgias.   Skin: Negative for color change and rash.   Neurological: Negative for dizziness, tremors, seizures, speech difficulty, weakness, light-headedness, numbness and headaches.   Psychiatric/Behavioral: Negative for confusion.       Objective:       Vitals:    06/11/20 0909   BP: 114/78   Pulse: 88   Temp: 98.2 °F (36.8 °C)   TempSrc: Oral  "  SpO2: 99%   Weight: 88.1 kg (194 lb 3.6 oz)   Height: 5' 7" (1.702 m)     Physical Exam   Constitutional: He is oriented to person, place, and time. He appears well-developed and well-nourished. No distress.   Eyes: Conjunctivae are normal.   Neck: Neck supple.   Cardiovascular: Normal rate, regular rhythm, normal heart sounds and intact distal pulses.   Pulmonary/Chest: Effort normal and breath sounds normal.   Abdominal: Soft. Bowel sounds are normal. He exhibits mass (left ingunial mass on coughing). He exhibits no distension. There is no tenderness. There is no rebound and no guarding.   Musculoskeletal: He exhibits tenderness (left inner thigh).   No swelling or obvious abnormality of the spine   Neurological: He is alert and oriented to person, place, and time.   Skin: Skin is warm. Capillary refill takes less than 2 seconds.   Psychiatric: He has a normal mood and affect.       Assessment:       1. Left inguinal hernia    2. Asymptomatic microscopic hematuria    3. Chronic midline low back pain without sciatica        Plan:       1. Left inguinal hernia  -     Ambulatory referral/consult to General Surgery; Future; Expected date: 06/18/2020  Discussed observation vs hernia repair    2. Asymptomatic microscopic hematuria, reported as blood without breakdown on RBC per HPF  -     Urinalysis; Future; Expected date: 06/18/2020  This could be related to muscle enzymes indiscriminately being reported as blood. Encourage to hydrate and limit strenous activity. Repeat Urinalysis to evaluate for rbc.  Encourage abstinence from smoking. He uses OTC nicotine replacement therapy.    3. Chronic midline low back pain without sciatica  Activity modification. Heat/ cold therapy; topical Biofreeze, Aspercreme, Lidocaine OTC patches, Diclofenac gel. Tylenol as needed. Exercises for strengthening and increased range of motion; should he develop alarm symptoms then MRI would be indicated.    Disclaimer: This note has been " generated using voice-recognition software. There may be typographical errors that have been missed during proof-reading

## 2020-06-12 ENCOUNTER — TELEPHONE (OUTPATIENT)
Dept: PSYCHIATRY | Facility: CLINIC | Age: 35
End: 2020-06-12

## 2020-06-12 NOTE — TELEPHONE ENCOUNTER
Pt was called after not checking in within the first 10 minutes of his appointment time. Pt reported that he did not download and set up the [facility] but thought an alternate video method was being used. Pt was unable to set up the [facility] in an appropriate time to conduct an initial evaluation. Pt was informed he would need to reschedule and the provider would contact the clinic to help him get the earliest new patient appointment.    Shivani Rose MD  [facility]  PGY-3

## 2020-06-15 ENCOUNTER — OFFICE VISIT (OUTPATIENT)
Dept: PSYCHIATRY | Facility: CLINIC | Age: 35
End: 2020-06-15
Payer: MEDICAID

## 2020-06-15 DIAGNOSIS — F33.1 MAJOR DEPRESSIVE DISORDER, RECURRENT EPISODE, MODERATE DEGREE: Primary | ICD-10-CM

## 2020-06-15 DIAGNOSIS — F41.1 GAD (GENERALIZED ANXIETY DISORDER): ICD-10-CM

## 2020-06-15 PROCEDURE — 99205 OFFICE O/P NEW HI 60 MIN: CPT | Mod: 95,AF,HB, | Performed by: PSYCHIATRY & NEUROLOGY

## 2020-06-15 PROCEDURE — 99205 PR OFFICE/OUTPT VISIT, NEW, LEVL V, 60-74 MIN: ICD-10-PCS | Mod: 95,AF,HB, | Performed by: PSYCHIATRY & NEUROLOGY

## 2020-06-15 RX ORDER — BUPROPION HYDROCHLORIDE 150 MG/1
150 TABLET ORAL DAILY
Qty: 30 TABLET | Refills: 1 | Status: SHIPPED | OUTPATIENT
Start: 2020-06-15 | End: 2020-07-06 | Stop reason: SDUPTHER

## 2020-06-15 RX ORDER — MIRTAZAPINE 15 MG/1
15 TABLET, FILM COATED ORAL NIGHTLY
Qty: 30 TABLET | Refills: 1 | Status: SHIPPED | OUTPATIENT
Start: 2020-06-15 | End: 2020-07-06 | Stop reason: SDUPTHER
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
## 2020-06-16 ENCOUNTER — OFFICE VISIT (OUTPATIENT)
Dept: SURGERY | Facility: CLINIC | Age: 35
End: 2020-06-16
Payer: MEDICAID

## 2020-06-16 VITALS
BODY MASS INDEX: 30.28 KG/M2 | OXYGEN SATURATION: 98 % | DIASTOLIC BLOOD PRESSURE: 81 MMHG | HEART RATE: 98 BPM | TEMPERATURE: 99 F | SYSTOLIC BLOOD PRESSURE: 128 MMHG | WEIGHT: 193.31 LBS

## 2020-06-16 DIAGNOSIS — K40.90 LEFT INGUINAL HERNIA: Primary | ICD-10-CM

## 2020-06-16 PROCEDURE — 99203 OFFICE O/P NEW LOW 30 MIN: CPT | Mod: S$PBB,,, | Performed by: SURGERY

## 2020-06-16 PROCEDURE — 99999 PR PBB SHADOW E&M-EST. PATIENT-LVL V: ICD-10-PCS | Mod: PBBFAC,,, | Performed by: SURGERY

## 2020-06-16 PROCEDURE — 99203 PR OFFICE/OUTPT VISIT, NEW, LEVL III, 30-44 MIN: ICD-10-PCS | Mod: S$PBB,,, | Performed by: SURGERY

## 2020-06-16 PROCEDURE — 99999 PR PBB SHADOW E&M-EST. PATIENT-LVL V: CPT | Mod: PBBFAC,,, | Performed by: SURGERY

## 2020-06-16 PROCEDURE — 99215 OFFICE O/P EST HI 40 MIN: CPT | Mod: PBBFAC | Performed by: SURGERY

## 2020-06-16 RX ORDER — TRAMADOL HYDROCHLORIDE 50 MG/1
50 TABLET ORAL EVERY 6 HOURS
COMMUNITY
End: 2020-07-02

## 2020-06-16 NOTE — PROGRESS NOTES
"[facility] - General Surgery  History & Physical    Subjective:      Chief Complaint: Left Inguinal Hernia    Varinder Salazar is a 34 y.o. male  with PMHx of Asthma and COPD presents to clinic today c/o intermittent worsening sharp left inguinal pain. Pt states he was doing some heavy lifting a few days ago when he felt sharp, "stingy" pain in his left groin that radiates to his left testicle. The initial occurrence was about a year ago. Pain will come and go, starts as a dull ache and gradually worsens to a sharp pain lasting days at a time. The pain is made worse with coughing and heavy lifting. He was recently seen by his GP who diagnosed him with inguinal hernia based off clinical suspicion. Pt denies alleviating factors, fever, nausea, vomiting, rash or change in bowel movements.          Past Medical History:   Diagnosis Date    Alcohol abuse     ended 2-4 years ago    Anxiety     Asthma     COPD (chronic obstructive pulmonary disease)     Childhood asthma.    Depression     Hernia, inguinal     left    History of psychiatric hospitalization     Lateral epicondylitis 8/23/2013    Other male erectile dysfunction 7/24/2019    Sleep difficulties     Therapy      Past Surgical History:   Procedure Laterality Date    CYST REMOVAL      WISDOM TOOTH EXTRACTION       Family History   Problem Relation Age of Onset    Migraines Mother     Anxiety disorder Mother     Depression Mother     Diabetes Father     Heart disease Father     Dementia Father     Drug abuse Brother     Bipolar disorder Maternal Aunt     Schizophrenia Neg Hx     Suicide Neg Hx     Alcohol abuse Neg Hx      Social History     Tobacco Use    Smoking status: Current Some Day Smoker     Types: Cigarettes    Smokeless tobacco: Never Used    Tobacco comment: 1-2 cigarettes per week   Substance Use Topics    Alcohol use: Not Currently     Comment: social     Drug use: Yes     Types: Marijuana         Review of patient's " allergies indicates:  No Known Allergies     Review of Systems   Constitutional: Negative for chills and fever.   HENT: Negative.    Eyes: Negative.    Respiratory: Negative.    Cardiovascular: Negative.    Gastrointestinal: Negative for blood in stool, constipation, diarrhea, melena, nausea and vomiting.   Genitourinary: Negative for dysuria, hematuria and urgency.   Musculoskeletal:        Left groin pain   Skin: Negative for rash.   Neurological: Negative.    Endo/Heme/Allergies: Negative.        Objective:      Vital Signs (Most Recent)  Temp: 98.6 °F (37 °C) (06/16/20 1427)  Pulse: 98 (06/16/20 1427)  BP: 128/81 (06/16/20 1427)  SpO2: 98 % (06/16/20 1427)    Vitals:    06/16/20 1427   BP: 128/81   Pulse: 98   Temp: 98.6 °F (37 °C)   SpO2: 98%   Weight: 87.7 kg (193 lb 5.5 oz)       Physical Exam  Constitutional:       Appearance: Normal appearance. He is normal weight.   HENT:      Head: Normocephalic and atraumatic.      Nose: Nose normal.   Eyes:      Extraocular Movements: Extraocular movements intact.   Neck:      Musculoskeletal: Normal range of motion and neck supple.   Pulmonary:      Effort: Pulmonary effort is normal.      Breath sounds: Normal breath sounds.   Abdominal:      General: Abdomen is flat. Bowel sounds are normal.      Palpations: Abdomen is soft.      Hernia: A hernia (small indirect Left inguinal TTP) is present. Hernia is present in the left inguinal area. There is no hernia in the right inguinal area.   Genitourinary:     Penis: Normal.       Scrotum/Testes:         Right: Mass, tenderness or swelling not present.         Left: Tenderness present. Swelling, testicular hydrocele or varicocele not present.      Epididymis:      Right: Normal. Not inflamed or enlarged. No mass.      Left: Normal. Not inflamed or enlarged. No mass.   Neurological:      Mental Status: He is alert.         Data Review:    CBC:   Lab Results   Component Value Date    WBC 8.37 06/08/2020    RBC 5.27 06/08/2020     HGB 15.0 06/08/2020    HCT 46.9 06/08/2020     06/08/2020     BMP:   Lab Results   Component Value Date    GLU 96 06/08/2020     06/08/2020    K 4.7 06/08/2020     06/08/2020    CO2 25 06/08/2020    BUN 7 06/08/2020    CREATININE 0.9 06/08/2020    CALCIUM 9.4 06/08/2020     Radiology review:       Plan:    Left inguinal hernia:  - robotic repair planned for next wed 6/24  - risks discussed/ consents obtained          Kim Nguyen MS4

## 2020-06-16 NOTE — LETTER
June 23, 2020      Kimberly Wood MD  [address]  [facility] [address]           [facility] - General Surgery  [address]  [place] [address]  Phone: [phone]          Patient: Varinder Salazar   MR Number: [id number]   YOB: 1985   Date of Visit: 6/16/2020       Dear Dr. Kimberly Wood:    Thank you for referring Varinder Salazar to me for evaluation. Attached you will find relevant portions of my assessment and plan of care.    If you have questions, please do not hesitate to call me. I look forward to following Varinder Salazar along with you.    Sincerely,    Vinayak Guaman MD    Enclosure  CC:  No Recipients    If you would like to receive this communication electronically, please contact [email or web address] or [phone] to request more information on [employer] access.    For providers and/or their staff who would like to refer a patient to [facility], please contact us through our one-stop-shop provider referral line, [facility], at [phone].    If you feel you have received this communication in error or would no longer like to receive these types of communications, please e-mail [email or web address]

## 2020-06-16 NOTE — PATIENT INSTRUCTIONS
What Is a Hernia?    A hernia is when an organ or tissue pushes through a weak area in the belly (abdominal) wall. This weak area may be present at birth. Or it may be caused by abdominal strain over time. If not treated, a hernia can get worse with time and physical stress.  When a bulge forms  When there is a weak area in the abdominal wall, an organ or tissue can push outward. This often causes a bulge that you can see under your skin. The bulge may get bigger when you stand up. It may go away when you lie down. You may also feel some pressure or mild pain when lifting, coughing, urinating, or doing other activities.  Types of hernias  The type of hernia you have depends on its location. Most hernias form in the groin at or near the internal ring. This is the entrance to a canal between the abdomen and groin. Hernias can also occur in the abdomen, thigh, or genitals.  · An incisional hernia occurs at the site of a previous surgical incision.  · An umbilical hernia occurs at the navel.  · An indirect inguinal hernia occurs in the groin at the internal ring.  · A direct inguinal hernia occurs in the groin near the internal ring.  · A femoral hernia occurs just below the groin.  · An epigastric hernia occurs in the upper abdomen at the midline.  Diagnosis  In most cases, your healthcare provider can diagnose a hernia by doing a physical exam.  In some cases it might not be clear why you have a swelling in the belly wall. Then your provider may order an imaging test such as an ultrasound. This can help with the diagnosis.  Surgery  A hernia will not heal on its own. Surgery is needed to fix the weak spot in the abdominal wall. If not treated, a hernia can get larger. It can also cause serious health problems. The good news is that hernia surgery can be done quickly and safely. In some cases, you can go home the same day as your surgery.   When to call your provider  Call your healthcare provider right away if the  swelling around your hernia becomes larger, firmer, or more painful. These may be signs that your intestines are stuck in the abdominal wall. This is an emergency. The hernia must be repaired right away to avoid serious problems.  Date Last Reviewed: 7/1/2016 © 2000-2017 [employer]. [address] [place] [id number]. All rights reserved. This information is not intended as a substitute for professional medical care. Always follow your healthcare professional's instructions.        Having Hernia Surgery: Patch Repair  Surgery treats a hernia by repairing the weakness in the abdominal wall. An incision is made so the surgeon has a direct view of the hernia. The repair is then done through this incision (open surgery). To repair the defect, special mesh materials are used to patch the weak area and make a tension-free repair. Follow your healthcare providers advice on how to get ready for the procedure. You can usually go home the same day as your surgery. In some cases, though, you may need to stay in the hospital overnight.  Getting ready for surgery  Your healthcare provider will talk with you about preparing for surgery. Follow all the instructions youre given and be sure to:   · Tell your healthcare provider about any medicines, supplements, or herbs you take. This includes both prescription and over-the-counter items.  · Stop taking aspirin, ibuprofen, naproxen and other NSAIDs as directed.  · Arrange for an adult family member or friend to give you a ride home after surgery.  · Stop smoking. Smoking affects blood flow and can slow healing.  · Gently wash the surgical area the night before surgery.  · Follow any directions you are given for not eating or drinking before surgery.     Repair in Front           Repair in Back           Combination Repair      The day of surgery  Arrive at the hospital or surgical center at your scheduled time. Youll be asked to change into a patient  gown. Youll then be given an IV to provide fluids and medicine. Shortly before surgery, an anesthesiologist or nurse anesthetist will talk with you. He or she will explain the types of anesthesia used to prevent pain during surgery. You will have one or more of the following:  · Monitored sedation to make you relaxed and sleepy.  · Local anesthesia to numb the surgical site.  · Regional anesthesia to numb specific areas of your body.  · General anesthesia to let you sleep during surgery.  During the surgery  Most hernias are treated using tension-free repairs. This is surgery that uses special mesh materials to repair the weak area. Unlike traditional repairs, the abdominal fascia (tissue around the muscle that gives strength to the abdominal wall) isnt sewn together. Instead, the mesh covers the weak area like a patch. This repairs the defect without tension on the muscles. It also makes it less likely to happen again. The mesh is made of strong, flexible plastic that stays in the body. Over time, nearby tissues grow into the mesh to strengthen the repair.  After surgery  When the procedure is over, youll be taken to the recovery area to rest. Your blood pressure and heart rate will be monitored. Youll also have a bandage over the surgical site. To help reduce discomfort, youll be given pain medicines. You may also be given breathing exercises to keep your lungs clear. Later, youll be asked to get up and walk. This helps prevent blood clots in the legs. You can go home when your healthcare provider says youre ready.     Risks and complications  Hernia surgery is safe, but does have risks including:  · Bleeding  · Infection  · Anesthesia risks  · Mesh complications  · Inability to urinate   · Bowel or bladder injury   · Numbness or pain in the groin or leg  · Risk the hernia will happen again  · Damage to the testicles or testicular function      Date Last Reviewed: 10/1/2016  © 0901-4182 [employer]. [address] [place] [address], [place] [address]. All rights reserved. This information is not intended as a substitute for professional medical care. Always follow your healthcare professional's instructions.

## 2020-06-16 NOTE — PROGRESS NOTES
"[facility] - General Surgery  History & Physical    Assessment/Plan:     Left inguinal hernia:  - robotic repair planned for next wed 6/24  - risks discussed/ consents obtained    Subjective:      Chief Complaint: left inguinal hernia    HPI:    35 y/o male with PMHx of Asthma and COPD presents to clinic today c/o intermittent worsening sharp left inguinal pain. Pt states he was doing some heavy lifting a few days ago when he felt sharp, "stingy" pain in his left groin that radiates to his left testicle. The initial occurrence was about a year ago. Pain will come and go, starts as a dull ache and gradually worsens to a sharp pain lasting days at a time. The pain is made worse with coughing and heavy lifting. He was recently seen by his GP who diagnosed him with inguinal hernia based off clinical suspicion. Pt denies alleviating factors, fever, nausea, vomiting, rash or change in bowel movements.        Past Medical History:   Diagnosis Date    Alcohol abuse     ended 2-4 years ago    Anxiety     Asthma     COPD (chronic obstructive pulmonary disease)     Childhood asthma.    Depression     Hernia, inguinal     left    History of psychiatric hospitalization     Lateral epicondylitis 8/23/2013    Other male erectile dysfunction 7/24/2019    Sleep difficulties     Therapy      Past Surgical History:   Procedure Laterality Date    CYST REMOVAL      WISDOM TOOTH EXTRACTION       Family History   Problem Relation Age of Onset    Migraines Mother     Anxiety disorder Mother     Depression Mother     Diabetes Father     Heart disease Father     Dementia Father     Drug abuse Brother     Bipolar disorder Maternal Aunt     Schizophrenia Neg Hx     Suicide Neg Hx     Alcohol abuse Neg Hx      Social History     Tobacco Use    Smoking status: Current Some Day Smoker     Types: Cigarettes    Smokeless tobacco: Never Used    Tobacco comment: 1-2 cigarettes per week   Substance Use Topics    Alcohol use: " Not Currently     Comment: social     Drug use: Yes     Types: Marijuana       (Not in a hospital admission)    Review of patient's allergies indicates:  No Known Allergies       Objective:      Vital Signs (Most Recent)  Temp: 98.6 °F (37 °C) (06/16/20 1427)  Pulse: 98 (06/16/20 1427)  BP: 128/81 (06/16/20 1427)  SpO2: 98 % (06/16/20 1427)      Data Review:    CBC:   Lab Results   Component Value Date    WBC 8.37 06/08/2020    RBC 5.27 06/08/2020    HGB 15.0 06/08/2020    HCT 46.9 06/08/2020     06/08/2020

## 2020-06-18 ENCOUNTER — LAB VISIT (OUTPATIENT)
Dept: LAB | Facility: HOSPITAL | Age: 35
End: 2020-06-18
Attending: FAMILY MEDICINE
Payer: MEDICAID

## 2020-06-18 DIAGNOSIS — R31.21 ASYMPTOMATIC MICROSCOPIC HEMATURIA: ICD-10-CM

## 2020-06-18 LAB
BACTERIA #/AREA URNS AUTO: NORMAL /HPF
BILIRUB UR QL STRIP: NEGATIVE
CLARITY UR REFRACT.AUTO: CLEAR
COLOR UR AUTO: YELLOW
GLUCOSE UR QL STRIP: NEGATIVE
HGB UR QL STRIP: ABNORMAL
KETONES UR QL STRIP: NEGATIVE
LEUKOCYTE ESTERASE UR QL STRIP: NEGATIVE
MICROSCOPIC COMMENT: NORMAL
NITRITE UR QL STRIP: NEGATIVE
PH UR STRIP: 5 [PH] (ref 5–8)
PROT UR QL STRIP: NEGATIVE
RBC #/AREA URNS AUTO: 0 /HPF (ref 0–4)
SP GR UR STRIP: 1.02 (ref 1–1.03)
URN SPEC COLLECT METH UR: ABNORMAL
WBC #/AREA URNS AUTO: 1 /HPF (ref 0–5)

## 2020-06-18 PROCEDURE — 81001 URINALYSIS AUTO W/SCOPE: CPT

## 2020-06-22 ENCOUNTER — LAB VISIT (OUTPATIENT)
Dept: SURGERY | Facility: CLINIC | Age: 35
End: 2020-06-22
Payer: MEDICAID

## 2020-06-22 DIAGNOSIS — K40.90 LEFT INGUINAL HERNIA: ICD-10-CM

## 2020-06-22 LAB — SARS-COV-2 RNA RESP QL NAA+PROBE: NOT DETECTED

## 2020-06-22 PROCEDURE — U0003 INFECTIOUS AGENT DETECTION BY NUCLEIC ACID (DNA OR RNA); SEVERE ACUTE RESPIRATORY SYNDROME CORONAVIRUS 2 (SARS-COV-2) (CORONAVIRUS DISEASE [COVID-19]), AMPLIFIED PROBE TECHNIQUE, MAKING USE OF HIGH THROUGHPUT TECHNOLOGIES AS DESCRIBED BY CMS-2020-01-R: HCPCS

## 2020-06-23 ENCOUNTER — TELEPHONE (OUTPATIENT)
Dept: SURGERY | Facility: CLINIC | Age: 35
End: 2020-06-23

## 2020-06-23 ENCOUNTER — ANESTHESIA EVENT (OUTPATIENT)
Dept: SURGERY | Facility: HOSPITAL | Age: 35
End: 2020-06-23
Payer: MEDICAID

## 2020-06-23 RX ORDER — SODIUM CHLORIDE 9 MG/ML
INJECTION, SOLUTION INTRAVENOUS CONTINUOUS
Status: CANCELLED | OUTPATIENT
Start: 2020-06-23

## 2020-06-24 ENCOUNTER — ANESTHESIA (OUTPATIENT)
Dept: SURGERY | Facility: HOSPITAL | Age: 35
End: 2020-06-24
Payer: MEDICAID

## 2020-06-24 ENCOUNTER — HOSPITAL ENCOUNTER (OUTPATIENT)
Facility: HOSPITAL | Age: 35
Discharge: HOME | End: 2020-06-24
Attending: SURGERY | Admitting: SURGERY
Payer: MEDICAID

## 2020-06-24 VITALS
HEIGHT: 67 IN | OXYGEN SATURATION: 100 % | HEART RATE: 82 BPM | WEIGHT: 192 LBS | TEMPERATURE: 98 F | RESPIRATION RATE: 18 BRPM | DIASTOLIC BLOOD PRESSURE: 79 MMHG | SYSTOLIC BLOOD PRESSURE: 127 MMHG | BODY MASS INDEX: 30.13 KG/M2

## 2020-06-24 DIAGNOSIS — K40.90 LEFT INGUINAL HERNIA: Primary | ICD-10-CM

## 2020-06-24 PROCEDURE — 00840 ANES IPER PX LOWER ABD NOS: CPT | Performed by: SURGERY

## 2020-06-24 PROCEDURE — 37000008 HC ANESTHESIA 1ST 15 MINUTES: Performed by: SURGERY

## 2020-06-24 PROCEDURE — 63600175 PHARM REV CODE 636 W HCPCS: Performed by: SURGERY

## 2020-06-24 PROCEDURE — D9220A PRA ANESTHESIA: Mod: ANES,,, | Performed by: ANESTHESIOLOGY

## 2020-06-24 PROCEDURE — 88304 TISSUE EXAM BY PATHOLOGIST: CPT | Performed by: PATHOLOGY

## 2020-06-24 PROCEDURE — 25000003 PHARM REV CODE 250: Performed by: ANESTHESIOLOGY

## 2020-06-24 PROCEDURE — 25000003 PHARM REV CODE 250: Performed by: STUDENT IN AN ORGANIZED HEALTH CARE EDUCATION/TRAINING PROGRAM

## 2020-06-24 PROCEDURE — D9220A PRA ANESTHESIA: Mod: CRNA,,, | Performed by: NURSE ANESTHETIST, CERTIFIED REGISTERED

## 2020-06-24 PROCEDURE — 88304 TISSUE EXAM BY PATHOLOGIST: CPT | Mod: 26,,, | Performed by: PATHOLOGY

## 2020-06-24 PROCEDURE — 49650 PR LAP,INGUINAL HERNIA REPR,INITIAL: ICD-10-PCS | Mod: LT,,, | Performed by: SURGERY

## 2020-06-24 PROCEDURE — 49650 LAP ING HERNIA REPAIR INIT: CPT | Mod: LT,,, | Performed by: SURGERY

## 2020-06-24 PROCEDURE — 88304 PR  SURG PATH,LEVEL III: ICD-10-PCS | Mod: 26,,, | Performed by: PATHOLOGY

## 2020-06-24 PROCEDURE — 63600175 PHARM REV CODE 636 W HCPCS: Performed by: NURSE ANESTHETIST, CERTIFIED REGISTERED

## 2020-06-24 PROCEDURE — 71000015 HC POSTOP RECOV 1ST HR: Performed by: SURGERY

## 2020-06-24 PROCEDURE — D9220A PRA ANESTHESIA: ICD-10-PCS | Mod: ANES,,, | Performed by: ANESTHESIOLOGY

## 2020-06-24 PROCEDURE — 36000711: Performed by: SURGERY

## 2020-06-24 PROCEDURE — 94761 N-INVAS EAR/PLS OXIMETRY MLT: CPT

## 2020-06-24 PROCEDURE — 25000003 PHARM REV CODE 250: Performed by: SURGERY

## 2020-06-24 PROCEDURE — C1781 MESH (IMPLANTABLE): HCPCS | Performed by: SURGERY

## 2020-06-24 PROCEDURE — 36000710: Performed by: SURGERY

## 2020-06-24 PROCEDURE — 37000009 HC ANESTHESIA EA ADD 15 MINS: Performed by: SURGERY

## 2020-06-24 PROCEDURE — 71000016 HC POSTOP RECOV ADDL HR: Performed by: SURGERY

## 2020-06-24 PROCEDURE — 71000044 HC DOSC ROUTINE RECOVERY FIRST HOUR: Performed by: SURGERY

## 2020-06-24 PROCEDURE — D9220A PRA ANESTHESIA: ICD-10-PCS | Mod: CRNA,,, | Performed by: NURSE ANESTHETIST, CERTIFIED REGISTERED

## 2020-06-24 PROCEDURE — 63600175 PHARM REV CODE 636 W HCPCS: Performed by: STUDENT IN AN ORGANIZED HEALTH CARE EDUCATION/TRAINING PROGRAM

## 2020-06-24 PROCEDURE — 25000003 PHARM REV CODE 250: Performed by: NURSE ANESTHETIST, CERTIFIED REGISTERED

## 2020-06-24 DEVICE — MESH PROGRIP LAP 10X15CM LEFT: Type: IMPLANTABLE DEVICE | Site: INGUINAL | Status: FUNCTIONAL

## 2020-06-24 RX ORDER — LIDOCAINE HCL/PF 100 MG/5ML
SYRINGE (ML) INTRAVENOUS
Status: DISCONTINUED | OUTPATIENT
Start: 2020-06-24 | End: 2020-06-24

## 2020-06-24 RX ORDER — ONDANSETRON 2 MG/ML
4 INJECTION INTRAMUSCULAR; INTRAVENOUS DAILY PRN
Status: DISCONTINUED | OUTPATIENT
Start: 2020-06-24 | End: 2020-06-24 | Stop reason: HOSPADM

## 2020-06-24 RX ORDER — ACETAMINOPHEN 10 MG/ML
INJECTION, SOLUTION INTRAVENOUS
Status: DISCONTINUED | OUTPATIENT
Start: 2020-06-24 | End: 2020-06-24

## 2020-06-24 RX ORDER — ROCURONIUM BROMIDE 10 MG/ML
INJECTION, SOLUTION INTRAVENOUS
Status: DISCONTINUED | OUTPATIENT
Start: 2020-06-24 | End: 2020-06-24

## 2020-06-24 RX ORDER — FENTANYL CITRATE 50 UG/ML
INJECTION, SOLUTION INTRAMUSCULAR; INTRAVENOUS
Status: DISCONTINUED | OUTPATIENT
Start: 2020-06-24 | End: 2020-06-24

## 2020-06-24 RX ORDER — CEFAZOLIN SODIUM 1 G/3ML
2 INJECTION, POWDER, FOR SOLUTION INTRAMUSCULAR; INTRAVENOUS
Status: COMPLETED | OUTPATIENT
Start: 2020-06-24 | End: 2020-06-24

## 2020-06-24 RX ORDER — ACETAMINOPHEN 500 MG
1000 TABLET ORAL
Status: DISCONTINUED | OUTPATIENT
Start: 2020-06-24 | End: 2020-06-24

## 2020-06-24 RX ORDER — KETOROLAC TROMETHAMINE 30 MG/ML
INJECTION, SOLUTION INTRAMUSCULAR; INTRAVENOUS
Status: DISCONTINUED | OUTPATIENT
Start: 2020-06-24 | End: 2020-06-24

## 2020-06-24 RX ORDER — OXYCODONE AND ACETAMINOPHEN 5; 325 MG/1; MG/1
1 TABLET ORAL EVERY 4 HOURS PRN
Qty: 21 TABLET | Refills: 0 | Status: SHIPPED | OUTPATIENT
Start: 2020-06-24 | End: 2020-07-02

## 2020-06-24 RX ORDER — NICOTINE 21-14-7MG
KIT TRANSDERMAL
COMMUNITY
End: 2020-07-02

## 2020-06-24 RX ORDER — BUPIVACAINE HYDROCHLORIDE 2.5 MG/ML
INJECTION, SOLUTION EPIDURAL; INFILTRATION; INTRACAUDAL
Status: DISCONTINUED | OUTPATIENT
Start: 2020-06-24 | End: 2020-06-24 | Stop reason: HOSPADM

## 2020-06-24 RX ORDER — ONDANSETRON 2 MG/ML
INJECTION INTRAMUSCULAR; INTRAVENOUS
Status: DISCONTINUED | OUTPATIENT
Start: 2020-06-24 | End: 2020-06-24

## 2020-06-24 RX ORDER — SODIUM CHLORIDE 9 MG/ML
INJECTION, SOLUTION INTRAVENOUS CONTINUOUS
Status: DISCONTINUED | OUTPATIENT
Start: 2020-06-24 | End: 2020-06-24 | Stop reason: HOSPADM

## 2020-06-24 RX ORDER — ACETAMINOPHEN 325 MG/1
975 TABLET ORAL
Status: COMPLETED | OUTPATIENT
Start: 2020-06-24 | End: 2020-06-24

## 2020-06-24 RX ORDER — MIDAZOLAM HYDROCHLORIDE 1 MG/ML
INJECTION, SOLUTION INTRAMUSCULAR; INTRAVENOUS
Status: DISCONTINUED | OUTPATIENT
Start: 2020-06-24 | End: 2020-06-24

## 2020-06-24 RX ORDER — DEXAMETHASONE SODIUM PHOSPHATE 4 MG/ML
INJECTION, SOLUTION INTRA-ARTICULAR; INTRALESIONAL; INTRAMUSCULAR; INTRAVENOUS; SOFT TISSUE
Status: DISCONTINUED | OUTPATIENT
Start: 2020-06-24 | End: 2020-06-24

## 2020-06-24 RX ORDER — PROPOFOL 10 MG/ML
VIAL (ML) INTRAVENOUS
Status: DISCONTINUED | OUTPATIENT
Start: 2020-06-24 | End: 2020-06-24

## 2020-06-24 RX ORDER — OXYCODONE AND ACETAMINOPHEN 5; 325 MG/1; MG/1
1 TABLET ORAL EVERY 4 HOURS PRN
Status: DISCONTINUED | OUTPATIENT
Start: 2020-06-24 | End: 2020-06-24 | Stop reason: HOSPADM

## 2020-06-24 RX ORDER — SODIUM CHLORIDE 0.9 % (FLUSH) 0.9 %
10 SYRINGE (ML) INJECTION
Status: DISCONTINUED | OUTPATIENT
Start: 2020-06-24 | End: 2020-06-24 | Stop reason: HOSPADM

## 2020-06-24 RX ORDER — HYDROMORPHONE HYDROCHLORIDE 1 MG/ML
0.2 INJECTION, SOLUTION INTRAMUSCULAR; INTRAVENOUS; SUBCUTANEOUS EVERY 5 MIN PRN
Status: DISCONTINUED | OUTPATIENT
Start: 2020-06-24 | End: 2020-06-24 | Stop reason: HOSPADM

## 2020-06-24 RX ADMIN — PROPOFOL 200 MG: 10 INJECTION, EMULSION INTRAVENOUS at 08:06

## 2020-06-24 RX ADMIN — DEXAMETHASONE SODIUM PHOSPHATE 4 MG: 4 INJECTION, SOLUTION INTRAMUSCULAR; INTRAVENOUS at 10:06

## 2020-06-24 RX ADMIN — CEFAZOLIN 2 G: 330 INJECTION, POWDER, FOR SOLUTION INTRAMUSCULAR; INTRAVENOUS at 08:06

## 2020-06-24 RX ADMIN — HYDROMORPHONE HYDROCHLORIDE 0.2 MG: 1 INJECTION, SOLUTION INTRAMUSCULAR; INTRAVENOUS; SUBCUTANEOUS at 12:06

## 2020-06-24 RX ADMIN — SUGAMMADEX 174 MG: 100 INJECTION, SOLUTION INTRAVENOUS at 10:06

## 2020-06-24 RX ADMIN — FENTANYL CITRATE 100 MCG: 50 INJECTION, SOLUTION INTRAMUSCULAR; INTRAVENOUS at 08:06

## 2020-06-24 RX ADMIN — ROCURONIUM BROMIDE 10 MG: 10 INJECTION, SOLUTION INTRAVENOUS at 09:06

## 2020-06-24 RX ADMIN — OXYCODONE HYDROCHLORIDE AND ACETAMINOPHEN 1 TABLET: 5; 325 TABLET ORAL at 10:06

## 2020-06-24 RX ADMIN — ACETAMINOPHEN 975 MG: 325 TABLET ORAL at 07:06

## 2020-06-24 RX ADMIN — SODIUM CHLORIDE, SODIUM GLUCONATE, SODIUM ACETATE, POTASSIUM CHLORIDE, MAGNESIUM CHLORIDE, SODIUM PHOSPHATE, DIBASIC, AND POTASSIUM PHOSPHATE: .53; .5; .37; .037; .03; .012; .00082 INJECTION, SOLUTION INTRAVENOUS at 08:06

## 2020-06-24 RX ADMIN — LIDOCAINE HYDROCHLORIDE 100 MG: 20 INJECTION, SOLUTION INTRAVENOUS at 08:06

## 2020-06-24 RX ADMIN — MIDAZOLAM HYDROCHLORIDE 2 MG: 1 INJECTION, SOLUTION INTRAMUSCULAR; INTRAVENOUS at 08:06

## 2020-06-24 RX ADMIN — ACETAMINOPHEN 1000 MG: 10 INJECTION, SOLUTION INTRAVENOUS at 10:06

## 2020-06-24 RX ADMIN — ROCURONIUM BROMIDE 50 MG: 10 INJECTION, SOLUTION INTRAVENOUS at 08:06

## 2020-06-24 RX ADMIN — ONDANSETRON 4 MG: 2 INJECTION, SOLUTION INTRAMUSCULAR; INTRAVENOUS at 10:06

## 2020-06-24 RX ADMIN — KETOROLAC TROMETHAMINE 30 MG: 30 INJECTION, SOLUTION INTRAMUSCULAR; INTRAVENOUS at 10:06

## 2020-06-24 RX ADMIN — FENTANYL CITRATE 50 MCG: 50 INJECTION, SOLUTION INTRAMUSCULAR; INTRAVENOUS at 08:06

## 2020-06-24 NOTE — OP NOTE
DATE: 6/24/2020.    PREOPERATIVE DIAGNOSIS: Left inguinal hernia.    POSTOPERATIVE DIAGNOSIS: Left inguinal hernia.    PROCEDURE: Robot assisted left inguinal hernia repair with mesh.    ATTENDING SURGEON: Vinayak Guaman MD.    RESIDENT: Eduardo Stovall MD.    ANESTHESIA: General.    INDICATION:  Patient is a 34-year-old male who presented to my clinic with a history of reducible but painful left inguinal bulge.  Exam was consistent with left inguinal hernia.  We recommended robotic repair and he agreed.    PROCEDURE IN DETAIL:  Patient was taken to the operating room and placed supine.  After induction of general endotracheal tube anesthesia, his abdomen was prepped and draped in the standard fashion.  Time-out was performed.  Midline incision was made 18 cm from the pubic symphysis and carried down the fascia.  The fascia was elevated and the abdomen was entered under direct vision using Optiview technique and an 8 mm robotic trocar.  Pneumoperitoneum was established and the abdomen was inspected.  There was no evidence of injury related to entry.  Additional 8 mm trocars were placed under direct vision in the right and left upper quadrants.  The robot was docked and the instruments were inserted.  Procedure was performed at the surgeon console.  A small indirect inguinal hernia defect was identified.  There was no hernia present on the right.  Prior to and was incised and the flap was carried down towards the inguinal hernia with a combination blunt and cautery dissection.  Hernia sac was reduced and the cord structures were carefully dissected free from the peritoneum.  Flap was continued laterally and medially with good exposure of pubic symphysis and iliopubic tract to allow for appropriate position of the mesh.  A small cord lipoma was present and was reduced in passed off field as specimen.  Hemostasis was confirmed and cord structures confirmed intact. Progrip mesh was inserted and placed over the indirect  defect with good coverage with no tension.  Peritoneum was then reapproximated with running lock suture.  Trocars removed under direct vision and pneumoperitoneum was evacuated.  Incisions were closed with 4-0 Monocryl suture and Dermabond was applied.  Patient was awakened from anesthesia and transferred to the PACU in good condition.  All needle and sponge counts were correct at the conclusion of the case.  I was present the procedure in its entirety.    EBL: Less than 5 mL.    FINDINGS: Small indirect inguinal hernia with cord lipoma repaired with mesh.    COMPLICATIONS: None.

## 2020-06-24 NOTE — BRIEF OP NOTE
[facility]  Brief Operative Note    Surgery Date: 6/24/2020     Surgeon(s) and Role:     * Vinayak Guaman MD - Primary     * Eduardo Stovall MD - Resident - Assisting        Pre-op Diagnosis:  Left inguinal hernia [K40.90]    Post-op Diagnosis:  Post-Op Diagnosis Codes:     * Left inguinal hernia [K40.90]    Procedure(s) (LRB):  XI ROBOTIC REPAIR, HERNIA, INGUINAL Left (Left)    Anesthesia: General    Description of the findings of the procedure(s): Small indirect inguinal hernia. Robotic hernia repair without complication    Estimated Blood Loss: Less than 5 mL         Specimens:   L cord lipoma    Discharge Note    OUTCOME: Patient tolerated treatment/procedure well without complication and is now ready for discharge.    DISPOSITION: Home or Self Care    FINAL DIAGNOSIS:  Left inguinal hernia    FOLLOWUP: In clinic    DISCHARGE INSTRUCTIONS:    Discharge Procedure Orders   Diet Adult Regular   Order Comments: Avoid constipation while taking narcotic pain medications. Ok to take OTC stool softeners or Miralax     Call MD for:  temperature >100.4     Call MD for:  persistent nausea and vomiting or diarrhea     Call MD for:  severe uncontrolled pain     Call MD for:  difficulty breathing or increased cough     Lifting restrictions   Order Comments: Nothing over 10Lbs for the next 6 weeks     No dressing needed   Order Comments: Surgical glue was applied to your incision, it will wear off on its own over the next 2 weeks.     Activity as tolerated     Shower on day dressing removed (No bath)   Order Comments: You can start showering 48 hours after surgery, avoid soaking in tub or pool for at least 2 weeks

## 2020-06-24 NOTE — DISCHARGE INSTRUCTIONS
After Laparoscopic Hernia Repair  You had a procedure called laparoscopic hernia repair. A hernia is a defect in the tough tissue covering the musculature of the abdominal wall (fascia). During laparoscopic hernia surgery, a surgeon inserts a telescope attached to a camera as well as surgical instruments through tiny incisions in your abdomen. The surgeon repairs the hernia with a mesh, which patches the tear or weakness in the fascia.  Home care  · Note that your shoulder may feel tight or your neck may be stiff for 24 to 48 hours after your surgery. This is common and usually lasts a short time. You may also have numbness around the incision area.  · Keep doing the coughing and deep breathing exercises that you learned in the hospital. These will help to prevent lung infection.  · Prevent constipation so you dont strain when going to the bathroom. Eat fruits, vegetables, and whole grains. Drink 6 to 8 glasses of water a day, unless otherwise directed. Use a laxative or a mild stool softener if your healthcare provider says its OK.  · Wash your incision with mild soap and water. Pat it dry. Dont use oil, powder, or lotion on your incision.  · Shower or take baths as instructed by your healthcare provider. Instructions will vary based on how your incision was closed and how its healing. It may be closed with glue, sutures, or staples. Your healthcare provider may have different advice for each kind.  Activity  · Ask others to help with chores and errands while you recover.  · Dont lift anything heavier than 10 pounds until your healthcare provider says its OK.  · Dont mow the lawn, use a vacuum , or do other strenuous activities until your healthcare provider says it's OK.  · Climb stairs slowly and pause after every few steps.  · Walk as often as you feel able.  · Ask your healthcare provider when you can drive again. This may be when you stop taking pain medicine and can move comfortably from side  to side. Dont drive if you are still taking opioid pain medicine.  When to call your healthcare provider   Call your healthcare provider right away if you have any of the following:  · Pain, bleeding, redness, or fluid at the incision site that gets worse  · Fever of 100.4°F (38°C) or higher, or as directed by your healthcare provider  · Vomiting or nausea that doesnt go away  · Inability to urinate  · No bowel movement after 3 days  · Swelling in abdomen or groin that gets worse  · Pain thats not relieved by medicine   Date Last Reviewed: 10/1/2016  © 2418-2128 [employer]. [address] [place] [address], [place] [address]. All rights reserved. This information is not intended as a substitute for professional medical care. Always follow your healthcare professional's instructions.

## 2020-06-24 NOTE — ANESTHESIA PREPROCEDURE EVALUATION
[facility]  Anesthesia Pre-Operative Evaluation         Patient Name: Varinder Salazar  YOB: 1985  MRN: [id number]    SUBJECTIVE:     Pre-operative evaluation for Procedure(s) (LRB):  XI ROBOTIC REPAIR, HERNIA, INGUINAL Left (Left)     06/23/2020    Varinder Salazar is a 34 y.o. male w/ a significant PMHx of asthma, hx of tobacco use, anxiety, and chronic lower back pain who presents w/ small L inguinal hernia.     Patient now presents for the above procedure(s).      LDA: None documented.     Prev airway: None documented.    Drips: None documented.      Patient Active Problem List   Diagnosis    Chronic left-sided low back pain with left-sided sciatica    Chronic midline low back pain without sciatica    Misuse of prescription only drugs       Review of patient's allergies indicates:  No Known Allergies    Current Inpatient Medications:      No current facility-administered medications on file prior to encounter.      Current Outpatient Medications on File Prior to Encounter   Medication Sig Dispense Refill    buPROPion (WELLBUTRIN XL) 150 MG TB24 tablet Take 1 tablet (150 mg total) by mouth once daily. 30 tablet 1    mirtazapine (REMERON) 15 MG tablet Take 1 tablet (15 mg total) by mouth every evening. 30 tablet 1    multivitamin capsule Take 1 capsule by mouth once daily.      naproxen (NAPROSYN) 500 MG tablet Take 1 tablet (500 mg total) by mouth 2 (two) times daily as needed (pain). 60 tablet 0    traMADoL (ULTRAM) 50 mg tablet Take 50 mg by mouth every 6 (six) hours.      lidocaine (LIDODERM) 5 % Apply to affected area as needed for pain for 12 hours, then off for 12 hours. Discard after each use.  May use 4% lidocaine patch as alternative. (Patient not taking: Reported on 6/11/2020) 30 patch 0    omeprazole (PRILOSEC) 20 MG capsule Take 2 capsules (40 mg total) by mouth once daily. (Patient not taking: Reported on 6/11/2020) 60 capsule 0       Past Surgical  History:   Procedure Laterality Date    CYST REMOVAL      WISDOM TOOTH EXTRACTION         Social History     Socioeconomic History    Marital status:      Spouse name: Not on file    Number of children: 2    Years of education: Not on file    Highest education level: Master's degree (e.g., MA, MS, Toño, MEd, MSW, WIL)   Occupational History    Occupation: Student   Social Needs    Financial resource strain: Not on file    Food insecurity     Worry: Not on file     Inability: Not on file    Transportation needs     Medical: Not on file     Non-medical: Not on file   Tobacco Use    Smoking status: Current Some Day Smoker     Types: Cigarettes    Smokeless tobacco: Never Used    Tobacco comment: 1-2 cigarettes per week   Substance and Sexual Activity    Alcohol use: Not Currently     Comment: social     Drug use: Yes     Types: Marijuana    Sexual activity: Yes     Partners: Female   Lifestyle    Physical activity     Days per week: Not on file     Minutes per session: Not on file    Stress: Not on file   Relationships    Social connections     Talks on phone: Not on file     Gets together: Not on file     Attends Islam service: Not on file     Active member of club or organization: Not on file     Attends meetings of clubs or organizations: Not on file     Relationship status: Not on file   Other Topics Concern    Patient feels they ought to cut down on drinking/drug use Not Asked    Patient annoyed by others criticizing their drinking/drug use Not Asked    Patient has felt bad or guilty about drinking/drug use Not Asked    Patient has had a drink/used drugs as an eye opener in the AM Not Asked   Social History Narrative    Pt was the youngest of 2 boys and 1 girl from an intact family.  He has 2 bachelor's and 1 master's degree, was never in the , and worked for [employer] before COVID-19.  He may go to nursing school.  Hobbies include video games and driving.  Friends have   or moved away.        Pt  once, at age 27.  They have one son and one daughter, and no pets.  They are moving into Pt's family home following his father's death last month and his mother moving to [place].  Pt is Jewish, attending occasionally.  6/15/2020       OBJECTIVE:     Vital Signs Range (Last 24H):         Significant Labs:  Lab Results   Component Value Date    WBC 8.37 06/08/2020    HGB 15.0 06/08/2020    HCT 46.9 06/08/2020     06/08/2020    ALT 24 06/08/2020    AST 20 06/08/2020     06/08/2020    K 4.7 06/08/2020     06/08/2020    CREATININE 0.9 06/08/2020    BUN 7 06/08/2020    CO2 25 06/08/2020    TSH 0.548 03/11/2020    HGBA1C 5.6 03/11/2020       Diagnostic Studies: No relevant studies.    EKG:   No results found for this or any previous visit.    2D ECHO:  TTE:  No results found for this or any previous visit.    FROY:  No results found for this or any previous visit.    ASSESSMENT/PLAN:                                                                                                   Anesthesia Evaluation    I have reviewed the Patient Summary Reports.    I have reviewed the Nursing Notes. I have reviewed the NPO Status.   I have reviewed the Medications.     Review of Systems  Anesthesia Hx:  No problems with previous Anesthesia  History of prior surgery of interest to airway management or planning: Denies Family Hx of Anesthesia complications.   Denies Personal Hx of Anesthesia complications.   Social:  Smoker    Hematology/Oncology:  Hematology Normal        Cardiovascular:  Cardiovascular Normal     Pulmonary:   Asthma    Renal/:  Renal/ Normal     Hepatic/GI:   GERD    Musculoskeletal:  Musculoskeletal Normal    Neurological:  Neurology Normal    Endocrine:  Endocrine Normal    Psych:   Psychiatric History             Anesthesia Plan  Type of Anesthesia, risks & benefits discussed:  Anesthesia Type:  general  Patient's Preference:   Intra-op Monitoring Plan:  standard ASA monitors  Intra-op Monitoring Plan Comments:   Post Op Pain Control Plan: multimodal analgesia, IV/PO Opioids PRN and per primary service following discharge from PACU  Post Op Pain Control Plan Comments:   Induction:   IV  Beta Blocker:  Patient is not currently on a Beta-Blocker (No further documentation required).       Informed Consent: Patient understands risks and agrees with Anesthesia plan.  Questions answered. Anesthesia consent signed with patient.  ASA Score: 2     Day of Surgery Review of History & Physical:    H&P update referred to the surgeon.         Ready For Surgery From Anesthesia Perspective.

## 2020-06-24 NOTE — TRANSFER OF CARE
"Anesthesia Transfer of Care Note    Patient: Varinder Salazar    Procedure(s) Performed: Procedure(s) (LRB):  XI ROBOTIC REPAIR, HERNIA, INGUINAL Left (Left)    Patient location: PACU    Anesthesia Type: general    Transport from OR: Transported from OR on 6-10 L/min O2 by face mask with adequate spontaneous ventilation    Post pain: adequate analgesia    Post assessment: no apparent anesthetic complications and tolerated procedure well    Post vital signs: stable    Level of consciousness: awake and alert    Nausea/Vomiting: no nausea/vomiting    Complications: none    Transfer of care protocol was followed      Last vitals:   Visit Vitals  /82   Pulse 90   Temp 36.8 °C (98.2 °F) (Oral)   Resp 16   Ht 5' 7" (1.702 m)   Wt 87.1 kg (192 lb)   SpO2 100%   BMI 30.07 kg/m²     "

## 2020-06-24 NOTE — ANESTHESIA PROCEDURE NOTES
Intubation  Performed by: Josh Torres CRNA  Authorized by: Ashlee Rodriguez MD     Intubation:     Induction:  Intravenous    Intubated:  Postinduction    Mask Ventilation:  Easy mask    Attempts:  1    Attempted By:  CRNA    Method of Intubation:  Direct    Blade:  Rina 4    Laryngeal View Grade: Grade IIA - cords partially seen      Difficult Airway Encountered?: No      Complications:  None    Airway Device:  Oral endotracheal tube    Airway Device Size:  7.5    Style/Cuff Inflation:  Cuffed    Tube secured:  23    Secured at:  The lips    Placement Verified By:  Capnometry    Complicating Factors:  None    Findings Post-Intubation:  BS equal bilateral and atraumatic/condition of teeth unchanged

## 2020-06-24 NOTE — ANESTHESIA POSTPROCEDURE EVALUATION
Anesthesia Post Evaluation    Patient: Varinder Salazar    Procedure(s) Performed: Procedure(s) (LRB):  XI ROBOTIC REPAIR, HERNIA, INGUINAL Left (Left)    Final Anesthesia Type: general    Patient location during evaluation: PACU  Patient participation: Yes- Able to Participate  Level of consciousness: awake and alert  Post-procedure vital signs: reviewed and stable  Pain management: adequate  Airway patency: patent    PONV status at discharge: No PONV  Anesthetic complications: no      Cardiovascular status: blood pressure returned to baseline  Respiratory status: unassisted  Hydration status: euvolemic  Follow-up not needed.          Vitals Value Taken Time   /83 06/24/20 1130   Temp  06/24/20 1238   Pulse 79 06/24/20 1237   Resp 18 06/24/20 1214   SpO2 99 % 06/24/20 1237   Vitals shown include unvalidated device data.      No case tracking events are documented in the log.      Pain/Gerald Score: Pain Rating Prior to Med Admin: 7 (6/24/2020 12:14 PM)  Gerald Score: 9 (6/24/2020 10:45 AM)

## 2020-06-25 ENCOUNTER — TELEPHONE (OUTPATIENT)
Dept: SURGERY | Facility: CLINIC | Age: 35
End: 2020-06-25

## 2020-06-25 NOTE — TELEPHONE ENCOUNTER
Pt called to report that he has 'white pus' coming from one of his incision sites.  He is s/p Robotic Inguinal Hernia Repair with Dr. Guaman from yesterday 6/24/2020.  He will send a photo through [facility] for review.

## 2020-06-25 NOTE — TELEPHONE ENCOUNTER
----- Message from Loree Zuniga sent at 6/25/2020  8:39 AM CDT -----  Pt had surgery on 6/24/2020 ant the cut has white pust around it and the pt is stating that he is hot then he is cold but does not have a temp and would like for the nurse to give him a call back at [phone]

## 2020-06-26 LAB
FINAL PATHOLOGIC DIAGNOSIS: NORMAL
GROSS: NORMAL

## 2020-07-02 ENCOUNTER — HOSPITAL ENCOUNTER (EMERGENCY)
Facility: OTHER | Age: 35
Discharge: HOME | End: 2020-07-02
Attending: EMERGENCY MEDICINE
Payer: MEDICAID

## 2020-07-02 VITALS
TEMPERATURE: 99 F | RESPIRATION RATE: 16 BRPM | HEART RATE: 91 BPM | DIASTOLIC BLOOD PRESSURE: 83 MMHG | WEIGHT: 200 LBS | SYSTOLIC BLOOD PRESSURE: 123 MMHG | BODY MASS INDEX: 31.39 KG/M2 | OXYGEN SATURATION: 99 % | HEIGHT: 67 IN

## 2020-07-02 DIAGNOSIS — Z98.890 STATUS POST LAPAROSCOPIC HERNIA REPAIR: ICD-10-CM

## 2020-07-02 DIAGNOSIS — Z87.19 STATUS POST LAPAROSCOPIC HERNIA REPAIR: ICD-10-CM

## 2020-07-02 DIAGNOSIS — R30.0 DYSURIA: Primary | ICD-10-CM

## 2020-07-02 LAB
BACTERIA #/AREA URNS HPF: NORMAL /HPF
BILIRUB UR QL STRIP: NEGATIVE
CLARITY UR: CLEAR
COLOR UR: YELLOW
GLUCOSE UR QL STRIP: NEGATIVE
HGB UR QL STRIP: ABNORMAL
KETONES UR QL STRIP: NEGATIVE
LEUKOCYTE ESTERASE UR QL STRIP: NEGATIVE
MICROSCOPIC COMMENT: NORMAL
NITRITE UR QL STRIP: NEGATIVE
PH UR STRIP: 6 [PH] (ref 5–8)
PROT UR QL STRIP: NEGATIVE
RBC #/AREA URNS HPF: 2 /HPF (ref 0–4)
SP GR UR STRIP: >=1.03 (ref 1–1.03)
SQUAMOUS #/AREA URNS HPF: 0 /HPF
URN SPEC COLLECT METH UR: ABNORMAL
UROBILINOGEN UR STRIP-ACNC: NEGATIVE EU/DL
WBC #/AREA URNS HPF: 0 /HPF (ref 0–5)

## 2020-07-02 PROCEDURE — 81000 URINALYSIS NONAUTO W/SCOPE: CPT

## 2020-07-02 PROCEDURE — 99282 EMERGENCY DEPT VISIT SF MDM: CPT

## 2020-07-02 NOTE — ED PROVIDER NOTES
Encounter Date: 7/2/2020    SCRIBE #1 NOTE: I, Phyllis Barrios, am scribing for, and in the presence of, Dr. Taylor.       History     Chief Complaint   Patient presents with    Urinary Frequency     pt with c/o urinary freqency and pain in area of surgery      Time seen by provider: 5:00 PM    This is a 34 y.o. male who presents with complaint of increased urinary frequency s/p left-sided hernia repair last Wednesday. He states that he has had two urinary accidents in bed as well. He reports central and left pelvic pain when he urinates. Pt states that he no longer has abdominal, testicular, or back pain. He denies fever, cough, SOB, chest pain, or hematuria. He has a follow-up on the 7th, however he went to the urgent care to evaluate his new symptoms and they referred him to the ED. Pt is on bupropion and mirtazapine and his compliant with his medications. He denies the use of drugs or alcohol. Pt recently quit smoking. He denies any strenuous activity.    The history is provided by the patient.     Review of patient's allergies indicates:  No Known Allergies  Past Medical History:   Diagnosis Date    Alcohol abuse     ended 2-4 years ago    Anxiety     Asthma     COPD (chronic obstructive pulmonary disease)     Childhood asthma.    Depression     Hernia, inguinal     left    History of psychiatric hospitalization     Lateral epicondylitis 8/23/2013    Other male erectile dysfunction 7/24/2019    Sleep difficulties     Therapy      Past Surgical History:   Procedure Laterality Date    CYST REMOVAL      ROBOT-ASSISTED LAPAROSCOPIC REPAIR OF INGUINAL HERNIA USING DA MARCOS XI Left 6/24/2020    Procedure: XI ROBOTIC REPAIR, HERNIA, INGUINAL Left;  Surgeon: Vinayak Guaman MD;  Location: [facility] OR [address] [place] [address];  Service: General;  Laterality: Left;    WISDOM TOOTH EXTRACTION       Family History   Problem Relation Age of Onset    Migraines Mother     Anxiety disorder Mother     Depression Mother      Diabetes Father     Heart disease Father     Dementia Father     Drug abuse Brother     Bipolar disorder Maternal Aunt     Schizophrenia Neg Hx     Suicide Neg Hx     Alcohol abuse Neg Hx      Social History     Tobacco Use    Smoking status: Former Smoker     Types: Cigarettes     Quit date: 2020     Years since quittin.0    Smokeless tobacco: Never Used   Substance Use Topics    Alcohol use: Not Currently    Drug use: Not Currently     Types: Marijuana     Review of Systems   Constitutional: Negative for chills and fever.   HENT: Negative for congestion.    Respiratory: Negative for cough and shortness of breath.    Cardiovascular: Negative for chest pain.   Gastrointestinal: Negative for abdominal pain and blood in stool.   Genitourinary: Positive for frequency (increased). Negative for hematuria and testicular pain.   Musculoskeletal: Negative for back pain.        Positive for pelvic pain.   Skin: Negative for rash.   Neurological: Negative for headaches.   Psychiatric/Behavioral: Negative for confusion.       Physical Exam     Initial Vitals [20 1636]   BP Pulse Resp Temp SpO2   128/84 95 18 98.6 °F (37 °C) 100 %      MAP       --         Physical Exam    Nursing note and vitals reviewed.  Constitutional: He appears well-developed and well-nourished. He is not diaphoretic. No distress.   HENT:   Head: Normocephalic and atraumatic.   Eyes: EOM are normal. Pupils are equal, round, and reactive to light.   Neck: Normal range of motion. Neck supple.   Abdominal: Soft. He exhibits no distension. There is no abdominal tenderness. There is no rebound and no guarding.   Laparoscopic incisions healing well.   Genitourinary:    Penis normal.      Genitourinary Comments: Tenderness to left inguinal region, but no masses or erythema. Scrotum and testes normal. No inguinal hernia or lymphadenopathy.     Musculoskeletal: Normal range of motion. No tenderness or edema.   Neurological: He is alert  and oriented to person, place, and time. He has normal strength.   Skin: Skin is warm and dry.   Psychiatric: He has a normal mood and affect. Thought content normal.         ED Course   Procedures  Labs Reviewed   URINALYSIS, REFLEX TO URINE CULTURE - Abnormal; Notable for the following components:       Result Value    Specific Gravity, UA >=1.030 (*)     Occult Blood UA 2+ (*)     All other components within normal limits    Narrative:     Specimen Source->Urine   URINALYSIS MICROSCOPIC    Narrative:     Specimen Source->Urine          Imaging Results    None          Medical Decision Making:   History:   Old Medical Records: I decided to obtain old medical records.  Clinical Tests:   Lab Tests: Ordered and Reviewed            Scribe Attestation:   Scribe #1: I performed the above scribed service and the documentation accurately describes the services I performed. I attest to the accuracy of the note.    Attending Attestation:           Physician Attestation for Scribe:  Physician Attestation Statement for Scribe #1: I, Dr. Taylor, reviewed documentation, as scribed by Phyllis Barrios in my presence, and it is both accurate and complete.                    Patient presents complaining of frequent urination and burning with urination for the past couple days.  He was concerned as he had a laparoscopic hernia repair approximately 1 week ago.  He has come 10 you experience some lower abdominal and pelvic pain especially with certain movements and positions but does report that the abdominal pain is better, testicular pain and swelling of resolved.  No fevers or systemic symptoms.  On exam incisions are healing well he does not have any masses or swelling in the inguinal region he has nonspecific tenderness in the region of anterior pelvis but no rash or cellulitis.  Urinalysis does not suggest UTI it is concentrated and therefore courage patient to increase fluid intake.  Continue alternating ibuprofen and Tylenol for  pain.  He already has follow-up scheduled with his surgeon next week.  Return precautions discussed for the interim           Clinical Impression:     1. Dysuria    2. Status post laparoscopic hernia repair                ED Disposition Condition    Discharge Stable        ED Prescriptions     None        Follow-up Information     Follow up With Specialties Details Why Contact Info    Vinayak Guaman MD General Surgery In 4 days  [address] [facility] [address]  [phone]                                       Armond Taylor II, MD  07/02/20 [id number]

## 2020-07-02 NOTE — ED TRIAGE NOTES
"Pt arrived with c/o urinary frequency x 2-3 days.  Pt reports he had L inguinal hernia repair last week.  Reports pain to surgical site upon urination, denies "burning upon urination."  Denies fever.  Reports dizziness since yesterday.  Pt ambulatory with steady gait.  "

## 2020-07-06 ENCOUNTER — OFFICE VISIT (OUTPATIENT)
Dept: PSYCHIATRY | Facility: CLINIC | Age: 35
End: 2020-07-06
Payer: MEDICAID

## 2020-07-06 DIAGNOSIS — F41.1 GAD (GENERALIZED ANXIETY DISORDER): ICD-10-CM

## 2020-07-06 DIAGNOSIS — F33.1 MAJOR DEPRESSIVE DISORDER, RECURRENT EPISODE, MODERATE DEGREE: Primary | ICD-10-CM

## 2020-07-06 PROCEDURE — 99213 OFFICE O/P EST LOW 20 MIN: CPT | Mod: 95,AF,HB, | Performed by: PSYCHIATRY & NEUROLOGY

## 2020-07-06 PROCEDURE — 99213 PR OFFICE/OUTPT VISIT, EST, LEVL III, 20-29 MIN: ICD-10-PCS | Mod: 95,AF,HB, | Performed by: PSYCHIATRY & NEUROLOGY

## 2020-07-06 RX ORDER — BUPROPION HYDROCHLORIDE 300 MG/1
300 TABLET ORAL DAILY
Qty: 30 TABLET | Refills: 1 | Status: SHIPPED | OUTPATIENT
Start: 2020-07-06 | End: 2020-08-06 | Stop reason: SDUPTHER

## 2020-07-06 RX ORDER — MIRTAZAPINE 30 MG/1
30 TABLET, FILM COATED ORAL NIGHTLY
Qty: 30 TABLET | Refills: 1 | Status: SHIPPED | OUTPATIENT
Start: 2020-07-06 | End: 2020-08-06 | Stop reason: SDUPTHER

## 2020-07-06 NOTE — PROGRESS NOTES
The patient location is: in his car -- not moving.  The chief complaint leading to consultation is: Depression and grief.    Visit type: audiovisual    Face to Face time with patient: 9 min.  16 minutes of total time spent on the encounter, which includes face to face time and non-face to face time preparing to see the patient (eg, review of tests), Obtaining and/or reviewing separately obtained history, Documenting clinical information in the electronic or other health record, Independently interpreting results (not separately reported) and communicating results to the patient/family/caregiver, or Care coordination (not separately reported).     Each patient to whom he or she provides medical services by telemedicine is:  (1) informed of the relationship between the physician and patient and the respective role of any other health care provider with respect to management of the patient; and (2) notified that he or she may decline to receive medical services by telemedicine and may withdraw from such care at any time.    Notes:   Outpatient Psychiatry Follow-Up Visit (MD/NP)    7/6/2020    Clinical Status of Patient:  Outpatient (Ambulatory)    Chief Complaint:  Varinder Salazar is a 34 y.o. male who presents today for follow-up of depression.  Met with patient.      Interval History and Content of Current Session:  Interim Events/Subjective Report/Content of Current Session:  Pt was initially evaluated via Telepsychiatry on 06/15/2020 for c/o recurrent depression plus grief following the death of his father in May, 2020 and his mother moving out of state shortly thereafter.  Pt today presented via [facility] casually dressed and groomed.  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  He is making plans to begin nursing school, estimating that he already has 25% of the necessary hours.  His affect appears more relaxed than it was at his first visit, but  "subjectively he says that depression is "no better, but no worse".  He did say sleep was better.  He had one inguinal hernia repair between visits, with another scheduled.    Medications were discussed.  Other than a few days of restlessness after taking Remeron, he denied any adverse effects.  He agreed to an increase of both medicines in order to target remaining depression.  He agreed that the passage of time should also ease the healing process.  He was asked to return here via telemed in 1 month or call prn problems.    Psychotherapy:  · Target symptoms: depression  · Why chosen therapy is appropriate versus another modality: relevant to diagnosis, evidence based practice  · Outcome monitoring methods: self-report, observation  · Therapeutic intervention type: behavior modifying psychotherapy, supportive psychotherapy  · Topics discussed/themes: relationships difficulties, illness/death of a loved one, difficulty managing affect in interpersonal relationships, financial stressors, life stage transitional issues  · The patient's response to the intervention is motivated. The patient's progress toward treatment goals is limited.   · Duration of intervention: 9 minutes.    Review of Systems   · PSYCHIATRIC: Pertinant items are noted in the narrative.  · MUSCULOSKELETAL: Recent inguinal hernia repair.    Past Medical, Family and Social History: The patient's past medical, family and social history have been reviewed and updated as appropriate within the electronic medical record - see encounter notes.    Compliance: yes    Side effects: None    Risk Parameters:  Patient reports no suicidal ideation  Patient reports no homicidal ideation  Patient reports no self-injurious behavior  Patient reports no violent behavior    Exam (detailed: at least 9 elements; comprehensive: all 15 elements)   Constitutional  Vitals:  Most recent vital signs, dated less than 90 days prior to this appointment, were not reviewed.   There " were no vitals filed for this visit.     General:  age appropriate, well nourished, casually dressed     Musculoskeletal  Muscle Strength/Tone:  not examined   Gait & Station:  Telemed visit     Psychiatric  Speech:  no latency; no press   Mood & Affect:  dysthymic  mood-congruent   Thought Process:  goal-directed, logical   Associations:  intact   Thought Content:  normal, no suicidality, no homicidality, delusions, or paranoia   Insight:  has awareness of illness   Judgement: behavior is adequate to circumstances   Orientation:  grossly intact   Memory: intact for content of interview   Language: grossly intact   Attention Span & Concentration:  able to focus   Fund of Knowledge:  intact and appropriate to age and level of education     Assessment and Diagnosis   Status/Progress: Based on the examination today, the patient's problem(s) is/are improved.  New problems have not been presented today.   Co-morbidities are not complicating management of the primary condition.  There are no active rule-out diagnoses for this patient at this time.     General Impression: Pt shows affective improvement, but subjective relief lags.      ICD-10-CM ICD-9-CM   1. Major depressive disorder, recurrent episode, moderate degree  F33.1 296.32   2. BECKY (generalized anxiety disorder)  F41.1 300.02       Intervention/Counseling/Treatment Plan   · Medication Management: Increase Wellbutrin and Remeron.  Continue vitamin.   · Decrease use of cannabis.      Return to Clinic: 1 month

## 2020-07-07 ENCOUNTER — OFFICE VISIT (OUTPATIENT)
Dept: SURGERY | Facility: CLINIC | Age: 35
End: 2020-07-07
Payer: MEDICAID

## 2020-07-07 ENCOUNTER — NURSE TRIAGE (OUTPATIENT)
Dept: ADMINISTRATIVE | Facility: CLINIC | Age: 35
End: 2020-07-07

## 2020-07-07 VITALS
OXYGEN SATURATION: 98 % | SYSTOLIC BLOOD PRESSURE: 127 MMHG | HEART RATE: 89 BPM | DIASTOLIC BLOOD PRESSURE: 78 MMHG | TEMPERATURE: 98 F

## 2020-07-07 DIAGNOSIS — Z48.89 POSTOPERATIVE VISIT: Primary | ICD-10-CM

## 2020-07-07 PROBLEM — K40.90 LEFT INGUINAL HERNIA: Status: RESOLVED | Noted: 2020-06-24 | Resolved: 2020-07-07

## 2020-07-07 PROCEDURE — 99999 PR PBB SHADOW E&M-EST. PATIENT-LVL III: ICD-10-PCS | Mod: PBBFAC,,, | Performed by: SURGERY

## 2020-07-07 PROCEDURE — 99024 POSTOP FOLLOW-UP VISIT: CPT | Mod: ,,, | Performed by: SURGERY

## 2020-07-07 PROCEDURE — 99024 PR POST-OP FOLLOW-UP VISIT: ICD-10-PCS | Mod: ,,, | Performed by: SURGERY

## 2020-07-07 PROCEDURE — 99999 PR PBB SHADOW E&M-EST. PATIENT-LVL III: CPT | Mod: PBBFAC,,, | Performed by: SURGERY

## 2020-07-07 PROCEDURE — 99213 OFFICE O/P EST LOW 20 MIN: CPT | Mod: PBBFAC | Performed by: SURGERY

## 2020-07-07 NOTE — PROGRESS NOTES
Varinder Salazar is a 34 y.o. male patient s/p left robotic inguinal hernia repair. The patient states that since the surgery he has had no pain at the incision sites. He was seen in the ED 5 days ago for evaluation of urinary frequency and urgency, with these symptoms having resolved on their own following his discharge home. The patient complains of mild serous drainage from the left incisional site yesterday, but denies any obvious bloody fluid drainage or discharge from the other sites. He has had mild bilateral pulling sensation into his scrotum when he stands for long periods of time, but states this has been adequately controlled with Tylenol or ibuprofen. He denies any episodes of fever or chills. He has tolerated full diet and is beginning to return to normal daily activities.    No diagnosis found.  Past Medical History:   Diagnosis Date    Alcohol abuse     ended 2-4 years ago    Anxiety     Asthma     COPD (chronic obstructive pulmonary disease)     Childhood asthma.    Depression     Hernia, inguinal     left    History of psychiatric hospitalization     Lateral epicondylitis 8/23/2013    Other male erectile dysfunction 7/24/2019    Sleep difficulties     Therapy      No past surgical history pertinent negatives on file.  Scheduled Meds:  Continuous Infusions:  PRN Meds:    Review of patient's allergies indicates:  No Known Allergies  There are no hospital problems to display for this patient.    Blood pressure 127/78, pulse 89, temperature 98.1 °F (36.7 °C), SpO2 98 %.    Subjective:   Diet: Adequate intake.  Patient reports no nausea or vomiting.    Activity level: Returning to normal.    Pain control: Partially controlled.    Wound: Draining.      Objective:  Vital signs (most recent): Blood pressure 127/78, pulse 89, temperature 98.1 °F (36.7 °C), SpO2 98 %.  General appearance: Comfortable, well-appearing and in no acute distress.    Lungs:  Normal effort.    Heart: Normal rate.  Regular  rhythm.    Chest: Symmetric chest wall expansion.    Abdomen: Abdomen is soft.    Tenderness: There is no abdominal tenderness tenderness.    Wound:  Clean.  There is no hernia.  There is no drainage.    Extremities: There is normal range of motion.    Neurological: The patient is alert and oriented to person, place and time.  Normal strength.       Assessment:   Post-op: 13 days.    Condition: In stable condition.       Varinder Salazar is a 35 y/o male s/p left robotic inguinal hernia repair. The patient is progressing adequately and may follow up with clinic PRN for any further concerns.          Raj Oneil, MS3  7/7/2020

## 2020-07-07 NOTE — TELEPHONE ENCOUNTER
Patient scheduled to be contacted today on behalf of the Post Procedural Symptom Tracker. Patient seen today in clinic. Per protocol, no additional contact required at this time.      Reason for Disposition   Caller has already spoken with the PCP and has no further questions.    Additional Information   Negative: Caller is angry or rude (e.g., hangs up, verbally abusive, yelling)   Negative: Caller hangs up    Protocols used: NO CONTACT OR DUPLICATE CONTACT CALL-A-AH

## 2020-08-06 ENCOUNTER — OFFICE VISIT (OUTPATIENT)
Dept: PSYCHIATRY | Facility: CLINIC | Age: 35
End: 2020-08-06
Payer: MEDICAID

## 2020-08-06 DIAGNOSIS — F43.22 ADJUSTMENT DISORDER WITH ANXIOUS MOOD: ICD-10-CM

## 2020-08-06 DIAGNOSIS — F41.1 GAD (GENERALIZED ANXIETY DISORDER): ICD-10-CM

## 2020-08-06 DIAGNOSIS — F33.1 MAJOR DEPRESSIVE DISORDER, RECURRENT EPISODE, MODERATE DEGREE: Primary | ICD-10-CM

## 2020-08-06 PROCEDURE — 90833 PSYTX W PT W E/M 30 MIN: CPT | Mod: AF,HB,S$PBB, | Performed by: PSYCHIATRY & NEUROLOGY

## 2020-08-06 PROCEDURE — 99999 PR PBB SHADOW E&M-EST. PATIENT-LVL II: ICD-10-PCS | Mod: PBBFAC,,, | Performed by: PSYCHIATRY & NEUROLOGY

## 2020-08-06 PROCEDURE — 99213 OFFICE O/P EST LOW 20 MIN: CPT | Mod: AF,HB,S$PBB, | Performed by: PSYCHIATRY & NEUROLOGY

## 2020-08-06 PROCEDURE — 99212 OFFICE O/P EST SF 10 MIN: CPT | Mod: PBBFAC | Performed by: PSYCHIATRY & NEUROLOGY

## 2020-08-06 PROCEDURE — 90833 PR PSYCHOTHERAPY W/PATIENT W/E&M, 30 MIN (ADD ON): ICD-10-PCS | Mod: AF,HB,S$PBB, | Performed by: PSYCHIATRY & NEUROLOGY

## 2020-08-06 PROCEDURE — 99213 PR OFFICE/OUTPT VISIT, EST, LEVL III, 20-29 MIN: ICD-10-PCS | Mod: AF,HB,S$PBB, | Performed by: PSYCHIATRY & NEUROLOGY

## 2020-08-06 PROCEDURE — 99999 PR PBB SHADOW E&M-EST. PATIENT-LVL II: CPT | Mod: PBBFAC,,, | Performed by: PSYCHIATRY & NEUROLOGY

## 2020-08-06 RX ORDER — MIRTAZAPINE 30 MG/1
30 TABLET, FILM COATED ORAL NIGHTLY
Qty: 30 TABLET | Refills: 1 | Status: SHIPPED | OUTPATIENT
Start: 2020-08-06 | End: 2020-10-20

## 2020-08-06 RX ORDER — BUPROPION HYDROCHLORIDE 300 MG/1
300 TABLET ORAL DAILY
Qty: 30 TABLET | Refills: 1 | Status: SHIPPED | OUTPATIENT
Start: 2020-08-06 | End: 2020-10-20

## 2020-08-06 RX ORDER — ESCITALOPRAM OXALATE 10 MG/1
10 TABLET ORAL DAILY
Qty: 90 TABLET | Refills: 0 | Status: SHIPPED | OUTPATIENT
Start: 2020-08-06 | End: 2020-10-20

## 2020-08-15 NOTE — PROGRESS NOTES
Outpatient Psychiatry Follow-Up Visit (MD/NP)    8/6/2020    Clinical Status of Patient:  Outpatient (Ambulatory)    Chief Complaint:  Varinder Salazar is a 34 y.o. male who presents today for follow-up of depression.  Met with patient.      Interval History and Content of Current Session:  Interim Events/Subjective Report/Content of Current Session:  Pt presented in person for the first time.  He was casually dressed and groomed, relaxed, and pleasant in affect.  He reported that original depression has improved significantly.  A visit from his mother helped.  He did complain of some recent irritability in dealing with his young son, who he says is frustrated by COVID-19 isolation.  Pt continues to plan toward a nursing degree with online courses this fall.    Medications were reviewed.  Addition of a more serotoninergic medication was explained and accepted.  Prior psychotropics will be continued.  Pt was asked to schedule a return visit in 2 months.    Psychotherapy:  · Target symptoms: depression  · Why chosen therapy is appropriate versus another modality: relevant to diagnosis, evidence based practice  · Outcome monitoring methods: self-report, observation  · Therapeutic intervention type: behavior modifying psychotherapy, supportive psychotherapy  · Topics discussed/themes: relationships difficulties, illness/death of a loved one, difficulty managing affect in interpersonal relationships, financial stressors, life stage transitional issues  · The patient's response to the intervention is motivated. The patient's progress toward treatment goals is fair.   · Duration of intervention: 23 minutes.    Review of Systems   · PSYCHIATRIC: Pertinant items are noted in the narrative.  · MUSCULOSKELETAL: Recent inguinal hernia repair.    Past Medical, Family and Social History: The patient's past medical, family and social history have been reviewed and updated as appropriate within the electronic medical record - see  encounter notes.    Compliance: yes    Side effects: None    Risk Parameters:  Patient reports no suicidal ideation  Patient reports no homicidal ideation  Patient reports no self-injurious behavior  Patient reports no violent behavior    Exam (detailed: at least 9 elements; comprehensive: all 15 elements)   Constitutional  Vitals:  Most recent vital signs, dated less than 90 days prior to this appointment, were not reviewed.   There were no vitals filed for this visit.     General:  age appropriate, well nourished, casually dressed     Musculoskeletal  Muscle Strength/Tone:  no rigidity, no dyskinesia, no dystonia, no tremor   Gait & Station:  non-ataxic     Psychiatric  Speech:  no latency; no press   Mood & Affect:  euthymic  mood-congruent   Thought Process:  goal-directed, logical   Associations:  intact   Thought Content:  normal, no suicidality, no homicidality, delusions, or paranoia   Insight:  has awareness of illness   Judgement: behavior is adequate to circumstances   Orientation:  grossly intact   Memory: intact for content of interview   Language: grossly intact   Attention Span & Concentration:  able to focus   Fund of Knowledge:  intact and appropriate to age and level of education     Assessment and Diagnosis   Status/Progress: Based on the examination today, the patient's problem(s) is/are improved.  New problems have not been presented today.   Co-morbidities are not complicating management of the primary condition.  There are no active rule-out diagnoses for this patient at this time.     General Impression: Pt showed further remission of depression.  Irritability was targeted with new medication.      ICD-10-CM ICD-9-CM   1. Major depressive disorder, recurrent episode, moderate degree  F33.1 296.32   2. BECKY (generalized anxiety disorder)  F41.1 300.02   3. Adjustment disorder with anxious mood  F43.22 309.24       Intervention/Counseling/Treatment Plan   · Medication Management: Continue  current medications. Add Lexapro 10 mg daily.   · Decrease use of cannabis.      Return to Clinic: 2 months

## 2020-08-24 ENCOUNTER — LAB VISIT (OUTPATIENT)
Dept: LAB | Facility: HOSPITAL | Age: 35
End: 2020-08-24
Attending: FAMILY MEDICINE
Payer: MEDICAID

## 2020-08-24 ENCOUNTER — OFFICE VISIT (OUTPATIENT)
Dept: PRIMARY CARE CLINIC | Facility: CLINIC | Age: 35
End: 2020-08-24
Payer: MEDICAID

## 2020-08-24 VITALS
BODY MASS INDEX: 34.29 KG/M2 | HEIGHT: 67 IN | WEIGHT: 218.5 LBS | TEMPERATURE: 99 F | OXYGEN SATURATION: 97 % | SYSTOLIC BLOOD PRESSURE: 130 MMHG | HEART RATE: 99 BPM | DIASTOLIC BLOOD PRESSURE: 78 MMHG

## 2020-08-24 DIAGNOSIS — Z00.00 ROUTINE MEDICAL EXAM: Primary | ICD-10-CM

## 2020-08-24 DIAGNOSIS — Z00.00 ROUTINE MEDICAL EXAM: ICD-10-CM

## 2020-08-24 DIAGNOSIS — F32.1 CURRENT MODERATE EPISODE OF MAJOR DEPRESSIVE DISORDER, UNSPECIFIED WHETHER RECURRENT: ICD-10-CM

## 2020-08-24 DIAGNOSIS — F41.1 GENERALIZED ANXIETY DISORDER: ICD-10-CM

## 2020-08-24 LAB
ALBUMIN SERPL BCP-MCNC: 4.1 G/DL (ref 3.5–5.2)
ALP SERPL-CCNC: 72 U/L (ref 55–135)
ALT SERPL W/O P-5'-P-CCNC: 38 U/L (ref 10–44)
ANION GAP SERPL CALC-SCNC: 10 MMOL/L (ref 8–16)
AST SERPL-CCNC: 44 U/L (ref 10–40)
BASOPHILS # BLD AUTO: 0.04 K/UL (ref 0–0.2)
BASOPHILS NFR BLD: 0.4 % (ref 0–1.9)
BILIRUB SERPL-MCNC: 0.2 MG/DL (ref 0.1–1)
BUN SERPL-MCNC: 15 MG/DL (ref 6–20)
CALCIUM SERPL-MCNC: 8.9 MG/DL (ref 8.7–10.5)
CHLORIDE SERPL-SCNC: 107 MMOL/L (ref 95–110)
CHOLEST SERPL-MCNC: 189 MG/DL (ref 120–199)
CHOLEST/HDLC SERPL: 3.5 {RATIO} (ref 2–5)
CO2 SERPL-SCNC: 23 MMOL/L (ref 23–29)
CREAT SERPL-MCNC: 1.1 MG/DL (ref 0.5–1.4)
DIFFERENTIAL METHOD: ABNORMAL
EOSINOPHIL # BLD AUTO: 0.1 K/UL (ref 0–0.5)
EOSINOPHIL NFR BLD: 1.4 % (ref 0–8)
ERYTHROCYTE [DISTWIDTH] IN BLOOD BY AUTOMATED COUNT: 13.3 % (ref 11.5–14.5)
EST. GFR  (AFRICAN AMERICAN): >60 ML/MIN/1.73 M^2
EST. GFR  (NON AFRICAN AMERICAN): >60 ML/MIN/1.73 M^2
ESTIMATED AVG GLUCOSE: 111 MG/DL (ref 68–131)
GLUCOSE SERPL-MCNC: 106 MG/DL (ref 70–110)
HBA1C MFR BLD HPLC: 5.5 % (ref 4–5.6)
HCT VFR BLD AUTO: 44.7 % (ref 40–54)
HDLC SERPL-MCNC: 54 MG/DL (ref 40–75)
HDLC SERPL: 28.6 % (ref 20–50)
HGB BLD-MCNC: 14.1 G/DL (ref 14–18)
IMM GRANULOCYTES # BLD AUTO: 0.03 K/UL (ref 0–0.04)
IMM GRANULOCYTES NFR BLD AUTO: 0.3 % (ref 0–0.5)
LDLC SERPL CALC-MCNC: 113.4 MG/DL (ref 63–159)
LYMPHOCYTES # BLD AUTO: 2.2 K/UL (ref 1–4.8)
LYMPHOCYTES NFR BLD: 23.2 % (ref 18–48)
MCH RBC QN AUTO: 28.5 PG (ref 27–31)
MCHC RBC AUTO-ENTMCNC: 31.5 G/DL (ref 32–36)
MCV RBC AUTO: 90 FL (ref 82–98)
MONOCYTES # BLD AUTO: 0.9 K/UL (ref 0.3–1)
MONOCYTES NFR BLD: 9.1 % (ref 4–15)
NEUTROPHILS # BLD AUTO: 6.2 K/UL (ref 1.8–7.7)
NEUTROPHILS NFR BLD: 65.6 % (ref 38–73)
NONHDLC SERPL-MCNC: 135 MG/DL
NRBC BLD-RTO: 0 /100 WBC
PLATELET # BLD AUTO: 304 K/UL (ref 150–350)
PMV BLD AUTO: 10.9 FL (ref 9.2–12.9)
POTASSIUM SERPL-SCNC: 4.4 MMOL/L (ref 3.5–5.1)
PROT SERPL-MCNC: 7.1 G/DL (ref 6–8.4)
RBC # BLD AUTO: 4.95 M/UL (ref 4.6–6.2)
SODIUM SERPL-SCNC: 140 MMOL/L (ref 136–145)
T4 FREE SERPL-MCNC: 0.83 NG/DL (ref 0.71–1.51)
TRIGL SERPL-MCNC: 108 MG/DL (ref 30–150)
TSH SERPL DL<=0.005 MIU/L-ACNC: 1.25 UIU/ML (ref 0.4–4)
WBC # BLD AUTO: 9.5 K/UL (ref 3.9–12.7)

## 2020-08-24 PROCEDURE — 86803 HEPATITIS C AB TEST: CPT

## 2020-08-24 PROCEDURE — 36415 COLL VENOUS BLD VENIPUNCTURE: CPT | Mod: PN

## 2020-08-24 PROCEDURE — 80061 LIPID PANEL: CPT

## 2020-08-24 PROCEDURE — 80053 COMPREHEN METABOLIC PANEL: CPT

## 2020-08-24 PROCEDURE — 84443 ASSAY THYROID STIM HORMONE: CPT

## 2020-08-24 PROCEDURE — 83036 HEMOGLOBIN GLYCOSYLATED A1C: CPT

## 2020-08-24 PROCEDURE — 85025 COMPLETE CBC W/AUTO DIFF WBC: CPT

## 2020-08-24 PROCEDURE — 99999 PR PBB SHADOW E&M-EST. PATIENT-LVL III: ICD-10-PCS | Mod: PBBFAC,,, | Performed by: FAMILY MEDICINE

## 2020-08-24 PROCEDURE — 99213 OFFICE O/P EST LOW 20 MIN: CPT | Mod: PBBFAC,PN | Performed by: FAMILY MEDICINE

## 2020-08-24 PROCEDURE — 99999 PR PBB SHADOW E&M-EST. PATIENT-LVL III: CPT | Mod: PBBFAC,,, | Performed by: FAMILY MEDICINE

## 2020-08-24 PROCEDURE — 84439 ASSAY OF FREE THYROXINE: CPT

## 2020-08-24 PROCEDURE — 99395 PREV VISIT EST AGE 18-39: CPT | Mod: S$PBB,,, | Performed by: FAMILY MEDICINE

## 2020-08-24 PROCEDURE — 99395 PR PREVENTIVE VISIT,EST,18-39: ICD-10-PCS | Mod: S$PBB,,, | Performed by: FAMILY MEDICINE

## 2020-08-24 NOTE — PROGRESS NOTES
Subjective:   Patient ID: Varinder Salazar is a 34 y.o. male.    Chief Complaint: Immunizations and Annual Exam      HPI  34 year old male here for physical exam.  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    Health screenings discussed.  ALLERGIES AND MEDICATIONS: updated and reviewed.  Review of patient's allergies indicates:  No Known Allergies    Current Outpatient Medications:     buPROPion (WELLBUTRIN XL) 300 MG 24 hr tablet, Take 1 tablet (300 mg total) by mouth once daily., Disp: 30 tablet, Rfl: 1    escitalopram oxalate (LEXAPRO) 10 MG tablet, Take 1 tablet (10 mg total) by mouth once daily., Disp: 90 tablet, Rfl: 0    mirtazapine (REMERON) 30 MG tablet, Take 1 tablet (30 mg total) by mouth every evening., Disp: 30 tablet, Rfl: 1    multivitamin capsule, Take 1 capsule by mouth once daily., Disp: , Rfl:     Review of Systems   Constitutional: Negative for activity change, appetite change, chills, diaphoresis, fatigue, fever and unexpected weight change.   HENT: Negative for congestion, ear discharge, ear pain, facial swelling, hearing loss, nosebleeds, postnasal drip, rhinorrhea, sinus pressure, sneezing, sore throat, tinnitus, trouble swallowing and voice change.    Eyes: Negative for photophobia, pain, discharge, redness, itching and visual disturbance.   Respiratory: Negative for cough, chest tightness, shortness of breath and wheezing.    Cardiovascular: Negative for chest pain, palpitations and leg swelling.   Gastrointestinal: Negative for abdominal distention, abdominal pain, anal bleeding, blood in stool, constipation, diarrhea, nausea, rectal pain and vomiting.   Endocrine: Negative for cold intolerance, heat intolerance, polydipsia, polyphagia and polyuria.   Genitourinary: Negative for difficulty urinating, dysuria and flank pain.   Musculoskeletal: Negative for arthralgias, back pain, joint swelling, myalgias and neck  "pain.   Skin: Negative for rash.   Neurological: Negative for dizziness, tremors, seizures, syncope, speech difficulty, weakness, light-headedness, numbness and headaches.   Psychiatric/Behavioral: Negative for behavioral problems, confusion, decreased concentration, dysphoric mood, sleep disturbance and suicidal ideas. The patient is not nervous/anxious and is not hyperactive.        Objective:     Vitals:    08/24/20 0926   BP: 130/78   Pulse: 99   Temp: 98.5 °F (36.9 °C)   TempSrc: Oral   SpO2: 97%   Weight: 99.1 kg (218 lb 7.6 oz)   Height: 5' 7" (1.702 m)     Body mass index is 34.22 kg/m².    Physical Exam  Vitals signs and nursing note reviewed.   Constitutional:       General: He is not in acute distress.     Appearance: He is well-developed. He is not diaphoretic.   HENT:      Head: Normocephalic and atraumatic.      Right Ear: Hearing, tympanic membrane, ear canal and external ear normal. No tenderness.      Left Ear: Hearing, tympanic membrane, ear canal and external ear normal. No tenderness.      Nose: Nose normal.   Eyes:      General: Lids are normal. No scleral icterus.        Right eye: No discharge.         Left eye: No discharge.      Extraocular Movements:      Right eye: Normal extraocular motion.      Left eye: Normal extraocular motion.      Conjunctiva/sclera: Conjunctivae normal.      Right eye: Right conjunctiva is not injected.      Left eye: Left conjunctiva is not injected.      Pupils: Pupils are equal, round, and reactive to light.   Neck:      Musculoskeletal: Normal range of motion and neck supple. No edema.      Thyroid: No thyromegaly.      Vascular: No carotid bruit or JVD.      Trachea: No tracheal deviation.   Cardiovascular:      Rate and Rhythm: Normal rate and regular rhythm.      Pulses: Normal pulses.      Heart sounds: Normal heart sounds. No murmur. No friction rub.   Pulmonary:      Effort: Pulmonary effort is normal. No accessory muscle usage or respiratory distress.    "   Breath sounds: Normal breath sounds. No wheezing, rhonchi or rales.   Abdominal:      General: Bowel sounds are normal. There is no distension or abdominal bruit.      Palpations: Abdomen is soft. There is no mass or pulsatile mass.      Tenderness: There is no abdominal tenderness. There is no guarding or rebound. Negative signs include Stephen's sign and McBurney's sign.   Lymphadenopathy:      Head:      Right side of head: No submandibular, preauricular or posterior auricular adenopathy.      Left side of head: No submandibular, preauricular or posterior auricular adenopathy.      Cervical: No cervical adenopathy.   Skin:     General: Skin is warm and dry.      Findings: No ecchymosis, erythema or rash. Rash is not urticarial.      Nails: There is no clubbing.     Neurological:      Mental Status: He is alert and oriented to person, place, and time.      GCS: GCS eye subscore is 4. GCS verbal subscore is 5. GCS motor subscore is 6.   Psychiatric:         Mood and Affect: Mood is not anxious or depressed. Affect is not angry or inappropriate.         Speech: Speech normal.         Behavior: Behavior normal. Behavior is cooperative.         Thought Content: Thought content normal.         Assessment and Plan:   Varinder was seen today for immunizations and annual exam.    Diagnoses and all orders for this visit:    Routine medical exam  -     Hepatitis C Antibody; Future  -     CBC auto differential; Future  -     Comprehensive metabolic panel; Future  -     Hemoglobin A1C; Future  -     Lipid Panel; Future  -     TSH; Future  -     T4, free; Future    Current moderate episode of major depressive disorder, unspecified whether recurrent    Generalized anxiety disorder        No follow-ups on file.    THIS NOTE WILL BE SHARED WITH THE PATIENT.

## 2020-08-25 LAB — HCV AB SERPL QL IA: NEGATIVE

## 2020-08-28 PROBLEM — F32.1 CURRENT MODERATE EPISODE OF MAJOR DEPRESSIVE DISORDER: Status: ACTIVE | Noted: 2020-08-28

## 2020-08-28 PROBLEM — F41.1 GENERALIZED ANXIETY DISORDER: Status: ACTIVE | Noted: 2020-08-28

## 2020-09-02 ENCOUNTER — TELEPHONE (OUTPATIENT)
Dept: PRIMARY CARE CLINIC | Facility: CLINIC | Age: 35
End: 2020-09-02

## 2020-09-02 NOTE — TELEPHONE ENCOUNTER
----- Message from Nelli Amanda sent at 9/2/2020  9:58 AM CDT -----  Contact: Patient [phone]  Patient was there on 08/24/2020 and needs additional vaccines. Flu & meningitis Pharmacy will not administer.    Please call and advise.    Thank You

## 2020-09-02 NOTE — TELEPHONE ENCOUNTER
Patient scheduled a nurse visit for 09/08/2020 for flu and ppd covered upstairs and his meningitis is covered at the pharmacy no charge.

## 2020-09-04 ENCOUNTER — TELEPHONE (OUTPATIENT)
Dept: PRIMARY CARE CLINIC | Facility: CLINIC | Age: 35
End: 2020-09-04

## 2020-09-04 NOTE — TELEPHONE ENCOUNTER
----- Message from Jeremi Ruiz MD sent at 9/4/2020 12:51 PM CDT -----  Contact: [phone]  Has he done ppd?  ----- Message -----  From: Jeremi Ruiz MD  Sent: 9/4/2020  11:20 AM CDT  To: Jeremi Ruiz MD    see  ----- Message -----  From: Kayy Arrieta  Sent: 9/4/2020   9:08 AM CDT  To: [facility]    Patient called to follow up on the status of the paper work he dropped off. Patient states he needs it as soon as possible. Please call and advise

## 2020-10-14 ENCOUNTER — TELEPHONE (OUTPATIENT)
Dept: PRIMARY CARE CLINIC | Facility: CLINIC | Age: 35
End: 2020-10-14

## 2020-10-14 NOTE — TELEPHONE ENCOUNTER
----- Message from Mikayla Hamlin sent at 10/14/2020  2:39 PM CDT -----  Contact: Pt- [phone]  Type:  Needs Medical Advice    Who Called:  Pt    Symptoms (please be specific): ear and arm pain    Would the patient rather a call back or a response via [facility]? Call    Best Call Back Number:  [phone]    Additional Information:  Pt called to schedule an appt with any provider at [facility]. I don't see any available appts. He is requesting a call back.

## 2020-10-19 ENCOUNTER — OFFICE VISIT (OUTPATIENT)
Dept: PRIMARY CARE CLINIC | Facility: CLINIC | Age: 35
End: 2020-10-19
Payer: MEDICAID

## 2020-10-19 VITALS
HEIGHT: 67 IN | BODY MASS INDEX: 35.54 KG/M2 | DIASTOLIC BLOOD PRESSURE: 80 MMHG | TEMPERATURE: 98 F | SYSTOLIC BLOOD PRESSURE: 118 MMHG | HEART RATE: 91 BPM | WEIGHT: 226.44 LBS | OXYGEN SATURATION: 97 %

## 2020-10-19 DIAGNOSIS — H60.501 ACUTE OTITIS EXTERNA OF RIGHT EAR, UNSPECIFIED TYPE: Primary | ICD-10-CM

## 2020-10-19 DIAGNOSIS — H69.90 DYSFUNCTION OF EUSTACHIAN TUBE, UNSPECIFIED LATERALITY: ICD-10-CM

## 2020-10-19 PROCEDURE — 99999 PR PBB SHADOW E&M-EST. PATIENT-LVL III: CPT | Mod: PBBFAC,,, | Performed by: FAMILY MEDICINE

## 2020-10-19 PROCEDURE — 99213 OFFICE O/P EST LOW 20 MIN: CPT | Mod: PBBFAC,PN | Performed by: FAMILY MEDICINE

## 2020-10-19 PROCEDURE — 99999 PR PBB SHADOW E&M-EST. PATIENT-LVL III: ICD-10-PCS | Mod: PBBFAC,,, | Performed by: FAMILY MEDICINE

## 2020-10-19 PROCEDURE — 99213 OFFICE O/P EST LOW 20 MIN: CPT | Mod: S$PBB,,, | Performed by: FAMILY MEDICINE

## 2020-10-19 PROCEDURE — 99213 PR OFFICE/OUTPT VISIT, EST, LEVL III, 20-29 MIN: ICD-10-PCS | Mod: S$PBB,,, | Performed by: FAMILY MEDICINE

## 2020-10-19 RX ORDER — CIPROFLOXACIN AND DEXAMETHASONE 3; 1 MG/ML; MG/ML
4 SUSPENSION/ DROPS AURICULAR (OTIC) 2 TIMES DAILY
Qty: 7.5 ML | Refills: 2 | Status: SHIPPED | OUTPATIENT
Start: 2020-10-19 | End: 2020-11-30 | Stop reason: SDUPTHER

## 2020-10-19 RX ORDER — METHYLPREDNISOLONE 4 MG/1
TABLET ORAL
Qty: 1 PACKAGE | Refills: 0 | Status: SHIPPED | OUTPATIENT
Start: 2020-10-19 | End: 2020-11-09

## 2020-10-19 NOTE — PROGRESS NOTES
Subjective:   Patient ID: Varinder Salazar is a 35 y.o. male.    Chief Complaint: Hearing Problem (right)      Otalgia   There is pain in the right ear. This is a new problem. The current episode started in the past 7 days. The problem occurs constantly. The problem has been unchanged. There has been no fever. The pain is at a severity of 3/10. The pain is mild. Associated symptoms include hearing loss. Pertinent negatives include no abdominal pain, coughing, diarrhea, ear discharge, headaches, neck pain, rash, rhinorrhea, sore throat or vomiting. He has tried nothing for the symptoms. The treatment provided no relief. There is no history of a chronic ear infection.         Patient queried and denies any further complaints.    LOCATION  DURATION  SEVERITY  QUALITY  TIMING  CAUSE  ASSOCIATED SYMPTOMS  MODIFIERS.    ALLERGIES AND MEDICATIONS: updated and reviewed.  Review of patient's allergies indicates:  No Known Allergies    Current Outpatient Medications:     multivitamin capsule, Take 1 capsule by mouth once daily., Disp: , Rfl:     ciprofloxacin-dexamethasone 0.3-0.1% (CIPRODEX) 0.3-0.1 % DrpS, Place 4 drops into both ears 2 (two) times daily., Disp: 7.5 mL, Rfl: 2    methylPREDNISolone (MEDROL DOSEPACK) 4 mg tablet, use as directed, Disp: 1 Package, Rfl: 0    Review of Systems   Constitutional: Negative for activity change, appetite change, chills, diaphoresis, fatigue, fever and unexpected weight change.   HENT: Positive for ear pain and hearing loss. Negative for congestion, ear discharge, postnasal drip, rhinorrhea, sneezing and sore throat.    Eyes: Negative for photophobia and discharge.   Respiratory: Negative for cough, chest tightness, shortness of breath and wheezing.    Cardiovascular: Negative for chest pain and palpitations.   Gastrointestinal: Negative for abdominal distention, abdominal pain, diarrhea, nausea and vomiting.   Genitourinary: Negative for dysuria.   Musculoskeletal: Negative for  "arthralgias and neck pain.   Skin: Negative for rash.   Neurological: Negative for headaches.       Objective:     Vitals:    10/19/20 1555   BP: 118/80   Pulse: 91   Temp: 98.4 °F (36.9 °C)   TempSrc: Oral   SpO2: 97%   Weight: 102.7 kg (226 lb 6.6 oz)   Height: 5' 7" (1.702 m)   PainSc: 0-No pain     Body mass index is 35.46 kg/m².    Physical Exam  Vitals signs and nursing note reviewed.   Constitutional:       General: He is not in acute distress.  HENT:      Head: Normocephalic and atraumatic.      Right Ear: No tenderness. No PE tube. No hemotympanum. Tympanic membrane is erythematous and retracted. Tympanic membrane is not injected, scarred or perforated.      Left Ear: No tenderness. No PE tube. No hemotympanum. Tympanic membrane is retracted. Tympanic membrane is not injected, scarred, perforated or erythematous.   Neurological:      Mental Status: He is alert.         Assessment and Plan:   Varinder was seen today for hearing problem.    Diagnoses and all orders for this visit:    Acute otitis externa of right ear, unspecified type    Dysfunction of Eustachian tube, unspecified laterality    Other orders  -     ciprofloxacin-dexamethasone 0.3-0.1% (CIPRODEX) 0.3-0.1 % DrpS; Place 4 drops into both ears 2 (two) times daily.  -     methylPREDNISolone (MEDROL DOSEPACK) 4 mg tablet; use as directed     Allegra, Flonase, Tylenol as needed.  Has both eustachian tube dysfunction bilaterally and specially on the right along with a an otitis externa on the right.  Meds as above.  Follow-up if not improving in about 1 week    No follow-ups on file.    THIS NOTE WILL BE SHARED WITH THE PATIENT.          "

## 2020-10-20 PROBLEM — H69.90 DYSFUNCTION OF EUSTACHIAN TUBE: Status: ACTIVE | Noted: 2020-10-20

## 2020-10-20 PROBLEM — H60.501 ACUTE OTITIS EXTERNA OF RIGHT EAR: Status: ACTIVE | Noted: 2020-10-20

## 2020-11-13 ENCOUNTER — OFFICE VISIT (OUTPATIENT)
Dept: URGENT CARE | Facility: CLINIC | Age: 35
End: 2020-11-13
Payer: MEDICAID

## 2020-11-13 VITALS
DIASTOLIC BLOOD PRESSURE: 89 MMHG | HEIGHT: 67 IN | WEIGHT: 226 LBS | OXYGEN SATURATION: 96 % | BODY MASS INDEX: 35.47 KG/M2 | SYSTOLIC BLOOD PRESSURE: 152 MMHG | HEART RATE: 100 BPM | RESPIRATION RATE: 18 BRPM | TEMPERATURE: 97 F

## 2020-11-13 DIAGNOSIS — J06.9 VIRAL URI WITH COUGH: Primary | ICD-10-CM

## 2020-11-13 DIAGNOSIS — H66.001 NON-RECURRENT ACUTE SUPPURATIVE OTITIS MEDIA OF RIGHT EAR WITHOUT SPONTANEOUS RUPTURE OF TYMPANIC MEMBRANE: ICD-10-CM

## 2020-11-13 LAB
CTP QC/QA: YES
SARS-COV-2 RDRP RESP QL NAA+PROBE: NEGATIVE

## 2020-11-13 PROCEDURE — 99214 OFFICE O/P EST MOD 30 MIN: CPT | Mod: S$GLB,,, | Performed by: STUDENT IN AN ORGANIZED HEALTH CARE EDUCATION/TRAINING PROGRAM

## 2020-11-13 PROCEDURE — U0002: ICD-10-PCS | Mod: QW,S$GLB,, | Performed by: STUDENT IN AN ORGANIZED HEALTH CARE EDUCATION/TRAINING PROGRAM

## 2020-11-13 PROCEDURE — U0002 COVID-19 LAB TEST NON-CDC: HCPCS | Mod: QW,S$GLB,, | Performed by: STUDENT IN AN ORGANIZED HEALTH CARE EDUCATION/TRAINING PROGRAM

## 2020-11-13 PROCEDURE — 99214 PR OFFICE/OUTPT VISIT, EST, LEVL IV, 30-39 MIN: ICD-10-PCS | Mod: S$GLB,,, | Performed by: STUDENT IN AN ORGANIZED HEALTH CARE EDUCATION/TRAINING PROGRAM

## 2020-11-13 RX ORDER — AMOXICILLIN AND CLAVULANATE POTASSIUM 875; 125 MG/1; MG/1
1 TABLET, FILM COATED ORAL EVERY 12 HOURS
Qty: 14 TABLET | Refills: 0 | Status: SHIPPED | OUTPATIENT
Start: 2020-11-13 | End: 2020-11-20

## 2020-11-13 NOTE — PROGRESS NOTES
Subjective:       Patient ID: Varinder Salazar is a 35 y.o. male.    Vitals:  vitals were not taken for this visit.     Chief Complaint: Sore Throat and Cough    Pt presents for URI sx's. Reports cough, sore throat, ear pain, body aches, and nausea since yesterday. No know sick contacts. Denied f/c, emesis, abd pain, diarrhea, SoB, wheezing, or chest tightness.    Sore Throat   This is a new problem. The current episode started yesterday. The problem has been unchanged. Neither side of throat is experiencing more pain than the other. There has been no fever. The pain is at a severity of 4/10. The pain is mild. Associated symptoms include congestion, coughing and headaches. Pertinent negatives include no abdominal pain, diarrhea, drooling, ear pain, plugged ear sensation, neck pain, shortness of breath, stridor, swollen glands, trouble swallowing or vomiting. He has had no exposure to strep or mono. Treatments tried: NYQUIL. The treatment provided mild relief.   Cough  This is a new problem. The current episode started yesterday. The problem has been unchanged. The problem occurs constantly. The cough is non-productive. Associated symptoms include headaches, myalgias, nasal congestion, postnasal drip and a sore throat. Pertinent negatives include no chest pain, chills, ear congestion, ear pain, eye redness, fever, rash, shortness of breath or wheezing. Nothing aggravates the symptoms. Treatments tried: NYQUIL. The treatment provided mild relief.       Constitution: Negative for chills, fatigue and fever.   HENT: Positive for congestion, postnasal drip and sore throat. Negative for ear pain, drooling, sinus pain, sinus pressure, trouble swallowing and voice change.    Neck: Negative for neck pain and painful lymph nodes.   Cardiovascular: Negative for chest pain, leg swelling and sob on exertion.   Eyes: Negative for eye discharge, eye itching, eye redness, double vision and blurred vision.   Respiratory: Positive for  cough. Negative for chest tightness, sputum production, shortness of breath, stridor and wheezing.    Gastrointestinal: Negative for abdominal pain, nausea, vomiting and diarrhea.   Musculoskeletal: Positive for muscle ache. Negative for joint pain, joint swelling and muscle cramps.   Skin: Negative for color change, rash and lesion.   Allergic/Immunologic: Negative for seasonal allergies.   Neurological: Positive for headaches. Negative for dizziness, light-headedness and passing out.   Hematologic/Lymphatic: Negative for swollen lymph nodes, easy bruising/bleeding and history of blood clots. Does not bruise/bleed easily.   Psychiatric/Behavioral: Negative for confusion, nervous/anxious, sleep disturbance and depression. The patient is not nervous/anxious.        Objective:      Physical Exam   Constitutional: He is oriented to person, place, and time. He appears well-developed. No distress.   HENT:   Head: Normocephalic and atraumatic.   Ears:   Right Ear: Hearing, external ear and ear canal normal. There is tenderness. No mastoid tenderness. Tympanic membrane is erythematous. Tympanic membrane is not perforated. A middle ear effusion is present.   Left Ear: Hearing, tympanic membrane, external ear and ear canal normal.   Mouth/Throat: Uvula is midline and mucous membranes are normal. No uvula swelling. Posterior oropharyngeal erythema present. No oropharyngeal exudate, posterior oropharyngeal edema or tonsillar abscesses. Tonsils are 1+ on the right. Tonsils are 1+ on the left. No tonsillar exudate.   Eyes: Conjunctivae and EOM are normal. Right eye exhibits no discharge. Left eye exhibits no discharge.   Neck: Normal range of motion. Neck supple.   Cardiovascular: Normal rate, regular rhythm and normal heart sounds. Exam reveals no gallop and no friction rub.   No murmur heard.  Pulmonary/Chest: Effort normal and breath sounds normal. No stridor. No respiratory distress. He has no wheezes. He has no rales.    Lymphadenopathy:     He has no cervical adenopathy.   Neurological: He is alert and oriented to person, place, and time. No cranial nerve deficit (CN II-XII grossly intact).   Skin: Skin is warm, dry and no rash. Psychiatric: His behavior is normal. Judgment and thought content normal.   Nursing note and vitals reviewed.    Recent Lab Results       11/13/20  1223         Acceptable Yes     SARS-CoV-2 RNA, Amplification, Qual Negative             Assessment:       1. Viral URI with cough    2. Non-recurrent acute suppurative otitis media of right ear without spontaneous rupture of tympanic membrane        Plan:         Viral URI with cough  -     POCT COVID-19 Rapid Screening  - discussed negative result with patient. Counseled Symptomatic patient without known exposure to continue home quarantine/isolation per CDC guidelines in event of false negative rapid COVID test    Non-recurrent acute suppurative otitis media of right ear without spontaneous rupture of tympanic membrane  -     amoxicillin-clavulanate 875-125mg (AUGMENTIN) 875-125 mg per tablet; Take 1 tablet by mouth every 12 (twelve) hours. for 7 days  Dispense: 14 tablet; Refill: 0      Results, medications and diagnosis reviewed with patient, questions answered, and return precautions given    Follow up if symptoms worsen or fail to improve.    Carmelo Snyder MD/MPH  [facility]  [facility]

## 2020-11-13 NOTE — PATIENT INSTRUCTIONS
"You may leave home and/or return to work when the following conditions are met:   24 hours fever free without fever-reducing medications AND   Improved symptoms   You have not met the conditions of a close exposure       A "close exposure" is defined as anyone who has had an exposure (masked or unmasked) to a known COVID -19 positive person within 6 ft for longer than 15 minutes. If your exposure meets this definition you are required by CDC guidelines to quarantine for 14 days from time of exposure regardless of test status.      Additional instructions:  · Social distance per your local guidelines  · Call ahead before visiting your doctor.  · Wear a facemask when around others who do not live in your household.  · Cover your coughs and sneezes.  · Wash your hands often with soap and water; hand  can be used, too.        Middle Ear Infection (Adult)  You have an infection of the middle ear, the space behind the eardrum. This is also called acute otitis media (AOM). Sometimes it is caused by the common cold. This is because congestion can block the internal passage (eustachian tube) that drains fluid from the middle ear. When the middle ear fills with fluid, bacteria can grow there and cause an infection. Oral antibiotics are used to treat this illness, not ear drops. Symptoms usually start to improve within 1 to 2 days of treatment.    Home care  The following are general care guidelines:  · Finish all of the antibiotic medicine given, even though you may feel better after the first few days.  · You may use over-the-counter medicine, such as acetaminophen or ibuprofen, to control pain and fever, unless something else was prescribed. If you have chronic liver or kidney disease or have ever had a stomach ulcer or gastrointestinal bleeding, talk with your healthcare provider before using these medicines. Do not give aspirin to anyone under 18 years of age who has a fever. It may cause severe illness or " death.  Follow-up care  Follow up with your healthcare provider, or as advised, in 2 weeks if all symptoms have not gotten better, or if hearing doesn't go back to normal within 1 month.  When to seek medical advice  Call your healthcare provider right away if any of these occur:  · Ear pain gets worse or does not improve after 3 days of treatment  · Unusual drowsiness or confusion  · Neck pain, stiff neck, or headache  · Fluid or blood draining from the ear canal  · Fever of 100.4°F (38°C) or as advised   · Seizure  Date Last Reviewed: 6/1/2016  © 3324-5326 [employer]. [address] [place] [address], [place] [address]. All rights reserved. This information is not intended as a substitute for professional medical care. Always follow your healthcare professional's instructions.

## 2020-11-27 ENCOUNTER — TELEPHONE (OUTPATIENT)
Dept: PRIMARY CARE CLINIC | Facility: CLINIC | Age: 35
End: 2020-11-27

## 2020-11-27 NOTE — TELEPHONE ENCOUNTER
----- Message from William Rojas sent at 11/27/2020  9:13 AM CST -----  Regarding: self   Patient called in regards to scheduling an urgent care appointment with Dr Ruiz the next available patient has medicaid insurance please advise

## 2020-11-27 NOTE — TELEPHONE ENCOUNTER
Patient would like to be scheduled for right arm pain. He states he has been seen several times for this, however its getting worse. Patient states he went to urgent care and was given muscle relaxer and told to follow up with PCP.

## 2020-11-30 ENCOUNTER — OFFICE VISIT (OUTPATIENT)
Dept: PRIMARY CARE CLINIC | Facility: CLINIC | Age: 35
End: 2020-11-30
Payer: MEDICAID

## 2020-11-30 VITALS
OXYGEN SATURATION: 98 % | HEART RATE: 101 BPM | DIASTOLIC BLOOD PRESSURE: 80 MMHG | HEIGHT: 67 IN | WEIGHT: 234.81 LBS | TEMPERATURE: 98 F | BODY MASS INDEX: 36.85 KG/M2 | SYSTOLIC BLOOD PRESSURE: 124 MMHG

## 2020-11-30 DIAGNOSIS — H60.61 CHRONIC OTITIS EXTERNA OF RIGHT EAR, UNSPECIFIED TYPE: ICD-10-CM

## 2020-11-30 DIAGNOSIS — G89.29 CHRONIC PAIN OF RIGHT ELBOW: Primary | ICD-10-CM

## 2020-11-30 DIAGNOSIS — M25.521 CHRONIC PAIN OF RIGHT ELBOW: Primary | ICD-10-CM

## 2020-11-30 DIAGNOSIS — M77.10 LATERAL EPICONDYLITIS, UNSPECIFIED LATERALITY: ICD-10-CM

## 2020-11-30 PROCEDURE — 99213 OFFICE O/P EST LOW 20 MIN: CPT | Mod: PBBFAC,PN | Performed by: FAMILY MEDICINE

## 2020-11-30 PROCEDURE — 99999 PR PBB SHADOW E&M-EST. PATIENT-LVL III: CPT | Mod: PBBFAC,,, | Performed by: FAMILY MEDICINE

## 2020-11-30 PROCEDURE — 99999 PR PBB SHADOW E&M-EST. PATIENT-LVL III: ICD-10-PCS | Mod: PBBFAC,,, | Performed by: FAMILY MEDICINE

## 2020-11-30 PROCEDURE — 99214 OFFICE O/P EST MOD 30 MIN: CPT | Mod: S$PBB,,, | Performed by: FAMILY MEDICINE

## 2020-11-30 PROCEDURE — 99214 PR OFFICE/OUTPT VISIT, EST, LEVL IV, 30-39 MIN: ICD-10-PCS | Mod: S$PBB,,, | Performed by: FAMILY MEDICINE

## 2020-11-30 RX ORDER — CYCLOBENZAPRINE HCL 10 MG
1 TABLET ORAL DAILY PRN
COMMUNITY
Start: 2020-11-02 | End: 2021-07-27 | Stop reason: ALTCHOICE

## 2020-11-30 RX ORDER — CIPROFLOXACIN AND DEXAMETHASONE 3; 1 MG/ML; MG/ML
4 SUSPENSION/ DROPS AURICULAR (OTIC) 2 TIMES DAILY
Qty: 7.5 ML | Refills: 2 | Status: SHIPPED | OUTPATIENT
Start: 2020-11-30 | End: 2021-06-21

## 2020-11-30 RX ORDER — NABUMETONE 500 MG/1
500 TABLET, FILM COATED ORAL 2 TIMES DAILY
Qty: 20 TABLET | Refills: 0 | Status: SHIPPED | OUTPATIENT
Start: 2020-11-30 | End: 2020-12-23

## 2020-12-01 ENCOUNTER — HOSPITAL ENCOUNTER (OUTPATIENT)
Dept: RADIOLOGY | Facility: HOSPITAL | Age: 35
Discharge: HOME | End: 2020-12-01
Attending: FAMILY MEDICINE
Payer: MEDICAID

## 2020-12-01 DIAGNOSIS — M25.521 CHRONIC PAIN OF RIGHT ELBOW: ICD-10-CM

## 2020-12-01 DIAGNOSIS — G89.29 CHRONIC PAIN OF RIGHT ELBOW: ICD-10-CM

## 2020-12-01 PROCEDURE — 73080 X-RAY EXAM OF ELBOW: CPT | Mod: TC,PN,RT

## 2020-12-01 PROCEDURE — 73080 X-RAY EXAM OF ELBOW: CPT | Mod: 26,RT,, | Performed by: RADIOLOGY

## 2020-12-01 PROCEDURE — 73080 XR ELBOW COMPLETE 3 VIEW RIGHT: ICD-10-PCS | Mod: 26,RT,, | Performed by: RADIOLOGY

## 2020-12-17 ENCOUNTER — PATIENT OUTREACH (OUTPATIENT)
Dept: ADMINISTRATIVE | Facility: OTHER | Age: 35
End: 2020-12-17

## 2020-12-17 NOTE — PROGRESS NOTES
LINKS immunization registry not responding  Care Everywhere updated  Health Maintenance updated  Chart reviewed for overdue Proactive [facility] ([facility]) health maintenance testing (CRS, Breast Ca, Diabetic Eye Exam)   Orders entered:N/A

## 2020-12-23 ENCOUNTER — OFFICE VISIT (OUTPATIENT)
Dept: ORTHOPEDICS | Facility: CLINIC | Age: 35
End: 2020-12-23
Payer: MEDICAID

## 2020-12-23 VITALS
WEIGHT: 236.56 LBS | SYSTOLIC BLOOD PRESSURE: 126 MMHG | RESPIRATION RATE: 16 BRPM | HEIGHT: 67 IN | HEART RATE: 91 BPM | BODY MASS INDEX: 37.13 KG/M2 | DIASTOLIC BLOOD PRESSURE: 84 MMHG

## 2020-12-23 DIAGNOSIS — M77.11 LATERAL EPICONDYLITIS, RIGHT ELBOW: Primary | ICD-10-CM

## 2020-12-23 PROCEDURE — 99999 PR PBB SHADOW E&M-EST. PATIENT-LVL III: ICD-10-PCS | Mod: PBBFAC,,, | Performed by: ORTHOPAEDIC SURGERY

## 2020-12-23 PROCEDURE — 99999 PR PBB SHADOW E&M-EST. PATIENT-LVL III: CPT | Mod: PBBFAC,,, | Performed by: ORTHOPAEDIC SURGERY

## 2020-12-23 PROCEDURE — 99203 OFFICE O/P NEW LOW 30 MIN: CPT | Mod: S$PBB,,, | Performed by: ORTHOPAEDIC SURGERY

## 2020-12-23 PROCEDURE — 99213 OFFICE O/P EST LOW 20 MIN: CPT | Mod: PBBFAC,PN | Performed by: ORTHOPAEDIC SURGERY

## 2020-12-23 PROCEDURE — 99203 PR OFFICE/OUTPT VISIT, NEW, LEVL III, 30-44 MIN: ICD-10-PCS | Mod: S$PBB,,, | Performed by: ORTHOPAEDIC SURGERY

## 2020-12-23 RX ORDER — DICLOFENAC SODIUM 75 MG/1
75 TABLET, DELAYED RELEASE ORAL 2 TIMES DAILY
Qty: 60 TABLET | Refills: 1 | Status: SHIPPED | OUTPATIENT
Start: 2020-12-23 | End: 2022-11-28

## 2020-12-23 NOTE — PROGRESS NOTES
Subjective:    Patient ID:  Varinder Salazar is a 35 y.o. y.o. male who presents for initial visit for Pain of the Right Elbow      34 yo male , RHD, reports few year h/o lateral right elbow pain, insidious onset. Pain achy to sharp and aggravated with gripping, grasping and lifting. Denies neck pain, motor weakness, paresthesias, night/rest pain or constitutional sxs. Has tried various NSAIDs and muscle relaxants without benefit. He has not had any other specific treatment to date.           Past Medical History:   Diagnosis Date    Alcohol abuse     ended 2-4 years ago    Anxiety     Asthma     COPD (chronic obstructive pulmonary disease)     Childhood asthma.    Depression     Hernia, inguinal     left    History of psychiatric hospitalization     Lateral epicondylitis 8/23/2013    Other male erectile dysfunction 7/24/2019    Sleep difficulties     Therapy         Past Surgical History:   Procedure Laterality Date    CYST REMOVAL      ROBOT-ASSISTED LAPAROSCOPIC REPAIR OF INGUINAL HERNIA USING DA MARCOS XI Left 6/24/2020    Procedure: XI ROBOTIC REPAIR, HERNIA, INGUINAL Left;  Surgeon: Vinayak Guaman MD;  Location: [facility] OR [address] [place] [address];  Service: General;  Laterality: Left;    WISDOM TOOTH EXTRACTION         Review of patient's allergies indicates:  No Known Allergies       Current Outpatient Medications:     ciprofloxacin-dexamethasone 0.3-0.1% (CIPRODEX) 0.3-0.1 % DrpS, Place 4 drops into both ears 2 (two) times daily. (Patient not taking: Reported on 12/23/2020), Disp: 7.5 mL, Rfl: 2    cyclobenzaprine (FLEXERIL) 10 MG tablet, Take 1 tablet by mouth daily as needed., Disp: , Rfl:     multivitamin capsule, Take 1 capsule by mouth once daily., Disp: , Rfl:     nabumetone (RELAFEN) 500 MG tablet, Take 1 tablet (500 mg total) by mouth 2 (two) times daily. X 3-10 days for pain and inflammation; take w food and water (Patient not taking: Reported on 12/23/2020), Disp: 20 tablet, Rfl: 0    Social  History     Socioeconomic History    Marital status:      Spouse name: Not on file    Number of children: 2    Years of education: Not on file    Highest education level: Master's degree (e.g., MA, MS, Toño, MEd, MSW, WIL)   Occupational History    Occupation: Student   Social Needs    Financial resource strain: Not on file    Food insecurity     Worry: Not on file     Inability: Not on file    Transportation needs     Medical: Not on file     Non-medical: Not on file   Tobacco Use    Smoking status: Current Every Day Smoker     Types: Cigarettes    Smokeless tobacco: Never Used   Substance and Sexual Activity    Alcohol use: Not Currently    Drug use: Not Currently     Types: Marijuana    Sexual activity: Yes     Partners: Female   Lifestyle    Physical activity     Days per week: Not on file     Minutes per session: Not on file    Stress: Not on file   Relationships    Social connections     Talks on phone: Not on file     Gets together: Not on file     Attends Anabaptist service: Not on file     Active member of club or organization: Not on file     Attends meetings of clubs or organizations: Not on file     Relationship status: Not on file   Other Topics Concern    Patient feels they ought to cut down on drinking/drug use Not Asked    Patient annoyed by others criticizing their drinking/drug use Not Asked    Patient has felt bad or guilty about drinking/drug use Not Asked    Patient has had a drink/used drugs as an eye opener in the AM Not Asked   Social History Narrative    Pt was the youngest of 2 boys and 1 girl from an intact family.  He has 2 bachelor's and 1 master's degree, was never in the , and worked for [employer] before COVID-19.  He may go to nursing school.  Hobbies include video games and driving.  Friends have  or moved away.        Pt  once, at age 27.  They have one son and one daughter, and no pets.  They are moving into Pt's family home following his  "father's death last month and his mother moving to [place].  Pt is [employer], attending occasionally.  6/15/2020        Family History   Problem Relation Age of Onset    Migraines Mother     Anxiety disorder Mother     Depression Mother     Diabetes Father     Heart disease Father     Dementia Father     Drug abuse Brother     Bipolar disorder Maternal Aunt     Schizophrenia Neg Hx     Suicide Neg Hx     Alcohol abuse Neg Hx         Review of Systems   Constitutional: Negative for chills and fever.   HENT: Negative for hearing loss.    Eyes: Negative for blurred vision.   Respiratory: Negative for shortness of breath.    Cardiovascular: Negative for chest pain.   Gastrointestinal: Negative for nausea and vomiting.   Genitourinary: Negative for dysuria.   Musculoskeletal: Negative for myalgias.   Skin: Negative for rash.   Neurological: Negative for speech change and loss of consciousness.   Endo/Heme/Allergies: Does not bruise/bleed easily.   Psychiatric/Behavioral: Positive for depression.        Objective:     /84 (BP Location: Left arm, Patient Position: Sitting, BP Method: Large (Automatic))   Pulse 91   Resp 16   Ht 5' 7" (1.702 m)   Wt 107.3 kg (236 lb 8.9 oz)   BMI 37.05 kg/m²     Ortho Exam     34 yo male in NAD; alert, oriented x 3; normal mood and affect    Head: atraumatic  Eyes: EOM are normal. Right eye exhibits no discharge. Left eye exhibits no discharge  Cardiovascular: normal rate    Pulmonary/Chest: effort normal; no respiratory distress  Abdominal: soft    Right elbow: N/V intact; tender lateral epicondyle/proximal common extensor; FROM; positive tennis elbow sign      Imaging:     X-rays 3-view right elbow dated 12/1/2020 are independently reviewed by me and are unremarkable.      Assessment & Plan:      1. Lateral epicondylitis, right elbow       1.  Findings, diagnosis, treatment options/risks/benefits were reviewed  2.  Voltaren 75 mg po bid (discontinue all other " NSAIDs)  3.  Universal wrist splint; splint wear/care instructions reviewed  4.  OT  5.  Maintain judicious activity modification/limitation  6.  Follow-up in 2 months

## 2020-12-23 NOTE — LETTER
December 23, 2020      Jeremi Ruiz MD  [address] [facility] [address]           [facility]  Orthopedics  [address], [facility] [address]  [facility] [address]  Phone: [phone]  Fax: [phone]          Patient: Varinder Salazar   MR Number: [id number]   YOB: 1985   Date of Visit: 12/23/2020       Dear Dr. Jeremi Ruiz:    Thank you for referring Varinder Salazar to me for evaluation. Attached you will find relevant portions of my assessment and plan of care.    If you have questions, please do not hesitate to call me. I look forward to following Varinder Salazar along with you.    Sincerely,    Palomo Wise MD    Enclosure  CC:  No Recipients    If you would like to receive this communication electronically, please contact [email or web address] or [phone] to request more information on [employer] access.    For providers and/or their staff who would like to refer a patient to [facility], please contact us through our one-stop-shop provider referral line, [facility], at [phone].    If you feel you have received this communication in error or would no longer like to receive these types of communications, please e-mail [email or web address]

## 2021-01-14 ENCOUNTER — CLINICAL SUPPORT (OUTPATIENT)
Dept: REHABILITATION | Facility: HOSPITAL | Age: 36
End: 2021-01-14
Attending: ORTHOPAEDIC SURGERY
Payer: MEDICAID

## 2021-01-14 DIAGNOSIS — M77.11 LATERAL EPICONDYLITIS, RIGHT ELBOW: ICD-10-CM

## 2021-01-29 ENCOUNTER — TELEPHONE (OUTPATIENT)
Dept: PRIMARY CARE CLINIC | Facility: CLINIC | Age: 36
End: 2021-01-29

## 2021-02-02 ENCOUNTER — CLINICAL SUPPORT (OUTPATIENT)
Dept: REHABILITATION | Facility: HOSPITAL | Age: 36
End: 2021-02-02
Payer: MEDICAID

## 2021-02-02 DIAGNOSIS — M77.11 LATERAL EPICONDYLITIS OF RIGHT ELBOW: Primary | ICD-10-CM

## 2021-02-02 DIAGNOSIS — R52 PAIN: ICD-10-CM

## 2021-02-02 PROCEDURE — 97165 OT EVAL LOW COMPLEX 30 MIN: CPT | Mod: PO

## 2021-02-17 ENCOUNTER — PATIENT OUTREACH (OUTPATIENT)
Dept: ADMINISTRATIVE | Facility: OTHER | Age: 36
End: 2021-02-17

## 2021-02-22 ENCOUNTER — TELEPHONE (OUTPATIENT)
Dept: ORTHOPEDICS | Facility: CLINIC | Age: 36
End: 2021-02-22

## 2021-02-24 ENCOUNTER — OFFICE VISIT (OUTPATIENT)
Dept: ORTHOPEDICS | Facility: CLINIC | Age: 36
End: 2021-02-24
Payer: MEDICAID

## 2021-02-24 VITALS
HEIGHT: 67 IN | TEMPERATURE: 98 F | SYSTOLIC BLOOD PRESSURE: 118 MMHG | HEART RATE: 68 BPM | WEIGHT: 229.19 LBS | BODY MASS INDEX: 35.97 KG/M2 | DIASTOLIC BLOOD PRESSURE: 76 MMHG

## 2021-02-24 DIAGNOSIS — M77.11 LATERAL EPICONDYLITIS, RIGHT ELBOW: Primary | ICD-10-CM

## 2021-02-24 PROCEDURE — 99213 OFFICE O/P EST LOW 20 MIN: CPT | Mod: PBBFAC,PN | Performed by: ORTHOPAEDIC SURGERY

## 2021-02-24 PROCEDURE — 99212 OFFICE O/P EST SF 10 MIN: CPT | Mod: S$PBB,,, | Performed by: ORTHOPAEDIC SURGERY

## 2021-02-24 PROCEDURE — 99999 PR PBB SHADOW E&M-EST. PATIENT-LVL III: ICD-10-PCS | Mod: PBBFAC,,, | Performed by: ORTHOPAEDIC SURGERY

## 2021-02-24 PROCEDURE — 99212 PR OFFICE/OUTPT VISIT, EST, LEVL II, 10-19 MIN: ICD-10-PCS | Mod: S$PBB,,, | Performed by: ORTHOPAEDIC SURGERY

## 2021-02-24 PROCEDURE — 99999 PR PBB SHADOW E&M-EST. PATIENT-LVL III: CPT | Mod: PBBFAC,,, | Performed by: ORTHOPAEDIC SURGERY

## 2021-03-02 ENCOUNTER — CLINICAL SUPPORT (OUTPATIENT)
Dept: REHABILITATION | Facility: HOSPITAL | Age: 36
End: 2021-03-02
Payer: MEDICAID

## 2021-03-02 DIAGNOSIS — M77.11 LATERAL EPICONDYLITIS OF RIGHT ELBOW: ICD-10-CM

## 2021-03-02 DIAGNOSIS — R52 PAIN: ICD-10-CM

## 2021-03-02 PROCEDURE — 97530 THERAPEUTIC ACTIVITIES: CPT | Mod: PO

## 2021-03-09 ENCOUNTER — CLINICAL SUPPORT (OUTPATIENT)
Dept: REHABILITATION | Facility: HOSPITAL | Age: 36
End: 2021-03-09
Payer: MEDICAID

## 2021-03-09 DIAGNOSIS — M77.11 LATERAL EPICONDYLITIS OF RIGHT ELBOW: ICD-10-CM

## 2021-03-09 DIAGNOSIS — R52 PAIN: ICD-10-CM

## 2021-03-09 PROCEDURE — 97530 THERAPEUTIC ACTIVITIES: CPT | Mod: PO

## 2021-03-16 ENCOUNTER — CLINICAL SUPPORT (OUTPATIENT)
Dept: REHABILITATION | Facility: HOSPITAL | Age: 36
End: 2021-03-16
Payer: MEDICAID

## 2021-03-16 DIAGNOSIS — M77.11 LATERAL EPICONDYLITIS OF RIGHT ELBOW: ICD-10-CM

## 2021-03-16 DIAGNOSIS — R52 PAIN: ICD-10-CM

## 2021-03-16 PROCEDURE — 97530 THERAPEUTIC ACTIVITIES: CPT | Mod: PO

## 2021-03-24 ENCOUNTER — CLINICAL SUPPORT (OUTPATIENT)
Dept: REHABILITATION | Facility: HOSPITAL | Age: 36
End: 2021-03-24
Payer: MEDICAID

## 2021-03-24 DIAGNOSIS — M77.11 LATERAL EPICONDYLITIS OF RIGHT ELBOW: ICD-10-CM

## 2021-03-24 DIAGNOSIS — R52 PAIN: ICD-10-CM

## 2021-03-24 PROCEDURE — 97110 THERAPEUTIC EXERCISES: CPT | Mod: PO

## 2021-03-24 PROCEDURE — 97140 MANUAL THERAPY 1/> REGIONS: CPT | Mod: PO

## 2021-03-24 PROCEDURE — 97022 WHIRLPOOL THERAPY: CPT | Mod: PO

## 2021-04-16 ENCOUNTER — PATIENT MESSAGE (OUTPATIENT)
Dept: RESEARCH | Facility: HOSPITAL | Age: 36
End: 2021-04-16

## 2021-04-26 ENCOUNTER — TELEPHONE (OUTPATIENT)
Dept: PRIMARY CARE CLINIC | Facility: CLINIC | Age: 36
End: 2021-04-26

## 2021-05-30 ENCOUNTER — HOSPITAL ENCOUNTER (EMERGENCY)
Facility: OTHER | Age: 36
Discharge: HOME | End: 2021-05-30
Attending: EMERGENCY MEDICINE
Payer: MEDICAID

## 2021-05-30 VITALS
RESPIRATION RATE: 14 BRPM | OXYGEN SATURATION: 99 % | HEIGHT: 67 IN | SYSTOLIC BLOOD PRESSURE: 130 MMHG | DIASTOLIC BLOOD PRESSURE: 90 MMHG | TEMPERATURE: 98 F | WEIGHT: 230 LBS | HEART RATE: 72 BPM | BODY MASS INDEX: 36.1 KG/M2

## 2021-05-30 DIAGNOSIS — M54.16 LUMBAR RADICULOPATHY: Primary | ICD-10-CM

## 2021-05-30 PROCEDURE — 99285 EMERGENCY DEPT VISIT HI MDM: CPT | Mod: 25

## 2021-05-30 PROCEDURE — 63600175 PHARM REV CODE 636 W HCPCS: Performed by: EMERGENCY MEDICINE

## 2021-05-30 PROCEDURE — 25000003 PHARM REV CODE 250: Performed by: EMERGENCY MEDICINE

## 2021-05-30 PROCEDURE — 96372 THER/PROPH/DIAG INJ SC/IM: CPT

## 2021-05-30 RX ORDER — METHOCARBAMOL 500 MG/1
1000 TABLET, FILM COATED ORAL
Status: COMPLETED | OUTPATIENT
Start: 2021-05-30 | End: 2021-05-30

## 2021-05-30 RX ORDER — DIAZEPAM 5 MG/1
5 TABLET ORAL EVERY 6 HOURS PRN
Qty: 10 TABLET | Refills: 0 | Status: SHIPPED | OUTPATIENT
Start: 2021-05-30 | End: 2021-06-21

## 2021-05-30 RX ORDER — KETOROLAC TROMETHAMINE 30 MG/ML
30 INJECTION, SOLUTION INTRAMUSCULAR; INTRAVENOUS
Status: COMPLETED | OUTPATIENT
Start: 2021-05-30 | End: 2021-05-30

## 2021-05-30 RX ORDER — IBUPROFEN 800 MG/1
800 TABLET ORAL EVERY 6 HOURS PRN
Qty: 20 TABLET | Refills: 0 | Status: SHIPPED | OUTPATIENT
Start: 2021-05-30 | End: 2021-06-21

## 2021-05-30 RX ORDER — DIAZEPAM 10 MG/2ML
10 INJECTION INTRAMUSCULAR
Status: COMPLETED | OUTPATIENT
Start: 2021-05-30 | End: 2021-05-30

## 2021-05-30 RX ADMIN — METHOCARBAMOL TABLETS 1000 MG: 500 TABLET, COATED ORAL at 07:05

## 2021-05-30 RX ADMIN — DIAZEPAM 10 MG: 5 INJECTION, SOLUTION INTRAMUSCULAR; INTRAVENOUS at 09:05

## 2021-05-30 RX ADMIN — KETOROLAC TROMETHAMINE 30 MG: 30 INJECTION, SOLUTION INTRAMUSCULAR at 07:05

## 2021-05-31 ENCOUNTER — TELEPHONE (OUTPATIENT)
Dept: PRIMARY CARE CLINIC | Facility: CLINIC | Age: 36
End: 2021-05-31

## 2021-06-01 ENCOUNTER — OFFICE VISIT (OUTPATIENT)
Dept: INTERNAL MEDICINE | Facility: CLINIC | Age: 36
End: 2021-06-01
Payer: MEDICAID

## 2021-06-01 VITALS
HEIGHT: 67 IN | TEMPERATURE: 98 F | DIASTOLIC BLOOD PRESSURE: 86 MMHG | HEART RATE: 92 BPM | WEIGHT: 230.63 LBS | SYSTOLIC BLOOD PRESSURE: 118 MMHG | OXYGEN SATURATION: 97 % | BODY MASS INDEX: 36.2 KG/M2 | RESPIRATION RATE: 18 BRPM

## 2021-06-01 DIAGNOSIS — M54.40 ACUTE LOW BACK PAIN WITH SCIATICA, SCIATICA LATERALITY UNSPECIFIED, UNSPECIFIED BACK PAIN LATERALITY: Primary | ICD-10-CM

## 2021-06-01 PROCEDURE — 99999 PR PBB SHADOW E&M-EST. PATIENT-LVL III: CPT | Mod: PBBFAC,,, | Performed by: INTERNAL MEDICINE

## 2021-06-01 PROCEDURE — 99213 OFFICE O/P EST LOW 20 MIN: CPT | Mod: PBBFAC,PO | Performed by: INTERNAL MEDICINE

## 2021-06-01 PROCEDURE — 99214 OFFICE O/P EST MOD 30 MIN: CPT | Mod: S$PBB,,, | Performed by: INTERNAL MEDICINE

## 2021-06-01 PROCEDURE — 99214 PR OFFICE/OUTPT VISIT, EST, LEVL IV, 30-39 MIN: ICD-10-PCS | Mod: S$PBB,,, | Performed by: INTERNAL MEDICINE

## 2021-06-01 PROCEDURE — 99999 PR PBB SHADOW E&M-EST. PATIENT-LVL III: ICD-10-PCS | Mod: PBBFAC,,, | Performed by: INTERNAL MEDICINE

## 2021-06-01 RX ORDER — METHYLPREDNISOLONE 4 MG/1
TABLET ORAL
Qty: 1 PACKAGE | Refills: 0 | Status: SHIPPED | OUTPATIENT
Start: 2021-06-01 | End: 2021-06-21

## 2021-06-01 RX ORDER — HYDROCODONE BITARTRATE AND ACETAMINOPHEN 5; 325 MG/1; MG/1
1 TABLET ORAL EVERY 6 HOURS PRN
Qty: 10 TABLET | Refills: 0 | Status: SHIPPED | OUTPATIENT
Start: 2021-06-01 | End: 2021-06-21

## 2021-06-21 ENCOUNTER — OFFICE VISIT (OUTPATIENT)
Dept: PRIMARY CARE CLINIC | Facility: CLINIC | Age: 36
End: 2021-06-21
Payer: MEDICAID

## 2021-06-21 VITALS
DIASTOLIC BLOOD PRESSURE: 86 MMHG | OXYGEN SATURATION: 98 % | WEIGHT: 228.63 LBS | TEMPERATURE: 99 F | BODY MASS INDEX: 36.74 KG/M2 | SYSTOLIC BLOOD PRESSURE: 124 MMHG | HEART RATE: 95 BPM | HEIGHT: 66 IN | RESPIRATION RATE: 18 BRPM

## 2021-06-21 DIAGNOSIS — M54.50 LOW BACK PAIN, UNSPECIFIED BACK PAIN LATERALITY, UNSPECIFIED CHRONICITY, UNSPECIFIED WHETHER SCIATICA PRESENT: Primary | ICD-10-CM

## 2021-06-21 DIAGNOSIS — M54.50 CHRONIC MIDLINE LOW BACK PAIN WITHOUT SCIATICA: ICD-10-CM

## 2021-06-21 DIAGNOSIS — G89.29 CHRONIC MIDLINE LOW BACK PAIN WITHOUT SCIATICA: ICD-10-CM

## 2021-06-21 PROCEDURE — 99214 PR OFFICE/OUTPT VISIT, EST, LEVL IV, 30-39 MIN: ICD-10-PCS | Mod: S$PBB,,, | Performed by: FAMILY MEDICINE

## 2021-06-21 PROCEDURE — 99214 OFFICE O/P EST MOD 30 MIN: CPT | Mod: S$PBB,,, | Performed by: FAMILY MEDICINE

## 2021-06-21 PROCEDURE — 99999 PR PBB SHADOW E&M-EST. PATIENT-LVL III: ICD-10-PCS | Mod: PBBFAC,,, | Performed by: FAMILY MEDICINE

## 2021-06-21 PROCEDURE — 99213 OFFICE O/P EST LOW 20 MIN: CPT | Mod: PBBFAC,PN | Performed by: FAMILY MEDICINE

## 2021-06-21 PROCEDURE — 99999 PR PBB SHADOW E&M-EST. PATIENT-LVL III: CPT | Mod: PBBFAC,,, | Performed by: FAMILY MEDICINE

## 2021-06-21 RX ORDER — BACLOFEN 10 MG/1
10 TABLET ORAL 3 TIMES DAILY
Qty: 60 TABLET | Refills: 0 | Status: SHIPPED | OUTPATIENT
Start: 2021-06-21 | End: 2021-06-21 | Stop reason: SDUPTHER

## 2021-06-21 RX ORDER — NABUMETONE 750 MG/1
750 TABLET, FILM COATED ORAL 2 TIMES DAILY
Qty: 60 TABLET | Refills: 0 | Status: SHIPPED | OUTPATIENT
Start: 2021-06-21 | End: 2021-06-21 | Stop reason: SDUPTHER

## 2021-06-21 RX ORDER — BACLOFEN 10 MG/1
TABLET ORAL
Qty: 60 TABLET | Refills: 0 | Status: SHIPPED | OUTPATIENT
Start: 2021-06-21 | End: 2021-09-17 | Stop reason: SDUPTHER

## 2021-06-21 RX ORDER — NABUMETONE 750 MG/1
750 TABLET, FILM COATED ORAL 2 TIMES DAILY
Qty: 60 TABLET | Refills: 0 | Status: SHIPPED | OUTPATIENT
Start: 2021-06-21 | End: 2022-11-28

## 2021-06-23 ENCOUNTER — IMMUNIZATION (OUTPATIENT)
Dept: INTERNAL MEDICINE | Facility: CLINIC | Age: 36
End: 2021-06-23
Payer: MEDICAID

## 2021-06-23 DIAGNOSIS — Z23 NEED FOR VACCINATION: Primary | ICD-10-CM

## 2021-06-23 PROCEDURE — 91300 COVID-19, MRNA, LNP-S, PF, 30 MCG/0.3 ML DOSE VACCINE: CPT | Mod: PBBFAC

## 2021-07-14 ENCOUNTER — IMMUNIZATION (OUTPATIENT)
Dept: INTERNAL MEDICINE | Facility: CLINIC | Age: 36
End: 2021-07-14
Payer: MEDICAID

## 2021-07-14 DIAGNOSIS — Z23 NEED FOR VACCINATION: Primary | ICD-10-CM

## 2021-07-14 PROCEDURE — 0002A COVID-19, MRNA, LNP-S, PF, 30 MCG/0.3 ML DOSE VACCINE: CPT | Mod: PBBFAC

## 2021-07-14 PROCEDURE — 91300 COVID-19, MRNA, LNP-S, PF, 30 MCG/0.3 ML DOSE VACCINE: CPT | Mod: PBBFAC

## 2021-07-27 ENCOUNTER — OFFICE VISIT (OUTPATIENT)
Dept: PSYCHIATRY | Facility: CLINIC | Age: 36
End: 2021-07-27
Payer: MEDICAID

## 2021-07-27 DIAGNOSIS — F41.1 GAD (GENERALIZED ANXIETY DISORDER): ICD-10-CM

## 2021-07-27 DIAGNOSIS — F33.1 MAJOR DEPRESSIVE DISORDER, RECURRENT EPISODE, MODERATE DEGREE: Primary | ICD-10-CM

## 2021-07-27 PROCEDURE — 90833 PSYTX W PT W E/M 30 MIN: CPT | Mod: AF,HB,, | Performed by: PSYCHIATRY & NEUROLOGY

## 2021-07-27 PROCEDURE — 99999 PR PBB SHADOW E&M-EST. PATIENT-LVL II: CPT | Mod: PBBFAC,AF,HB, | Performed by: PSYCHIATRY & NEUROLOGY

## 2021-07-27 PROCEDURE — 99212 OFFICE O/P EST SF 10 MIN: CPT | Mod: PBBFAC | Performed by: PSYCHIATRY & NEUROLOGY

## 2021-07-27 PROCEDURE — 90833 PR PSYCHOTHERAPY W/PATIENT W/E&M, 30 MIN (ADD ON): ICD-10-PCS | Mod: AF,HB,, | Performed by: PSYCHIATRY & NEUROLOGY

## 2021-07-27 PROCEDURE — 99213 PR OFFICE/OUTPT VISIT, EST, LEVL III, 20-29 MIN: ICD-10-PCS | Mod: S$PBB,AF,HB, | Performed by: PSYCHIATRY & NEUROLOGY

## 2021-07-27 PROCEDURE — 99999 PR PBB SHADOW E&M-EST. PATIENT-LVL II: ICD-10-PCS | Mod: PBBFAC,AF,HB, | Performed by: PSYCHIATRY & NEUROLOGY

## 2021-07-27 PROCEDURE — 99213 OFFICE O/P EST LOW 20 MIN: CPT | Mod: S$PBB,AF,HB, | Performed by: PSYCHIATRY & NEUROLOGY

## 2021-07-27 RX ORDER — ESCITALOPRAM OXALATE 10 MG/1
10 TABLET ORAL DAILY
Qty: 90 TABLET | Refills: 1 | Status: SHIPPED | OUTPATIENT
Start: 2021-07-27 | End: 2021-08-06 | Stop reason: SDUPTHER

## 2021-08-05 ENCOUNTER — PATIENT MESSAGE (OUTPATIENT)
Dept: PSYCHIATRY | Facility: CLINIC | Age: 36
End: 2021-08-05

## 2021-08-06 ENCOUNTER — TELEPHONE (OUTPATIENT)
Dept: PSYCHIATRY | Facility: CLINIC | Age: 36
End: 2021-08-06

## 2021-08-06 RX ORDER — ESCITALOPRAM OXALATE 20 MG/1
20 TABLET ORAL EVERY MORNING
Qty: 90 TABLET | Refills: 0 | Status: SHIPPED | OUTPATIENT
Start: 2021-08-06 | End: 2021-10-22 | Stop reason: SDUPTHER

## 2021-08-24 ENCOUNTER — TELEPHONE (OUTPATIENT)
Dept: PRIMARY CARE CLINIC | Facility: CLINIC | Age: 36
End: 2021-08-24

## 2021-08-24 DIAGNOSIS — M54.9 BACK PAIN, UNSPECIFIED BACK LOCATION, UNSPECIFIED BACK PAIN LATERALITY, UNSPECIFIED CHRONICITY: Primary | ICD-10-CM

## 2021-09-17 RX ORDER — BACLOFEN 10 MG/1
TABLET ORAL
Qty: 60 TABLET | Refills: 0 | Status: SHIPPED | OUTPATIENT
Start: 2021-09-17 | End: 2022-11-28

## 2021-10-22 ENCOUNTER — OFFICE VISIT (OUTPATIENT)
Dept: PSYCHIATRY | Facility: CLINIC | Age: 36
End: 2021-10-22
Payer: MEDICAID

## 2021-10-22 DIAGNOSIS — F41.1 GAD (GENERALIZED ANXIETY DISORDER): ICD-10-CM

## 2021-10-22 DIAGNOSIS — F33.1 MAJOR DEPRESSIVE DISORDER, RECURRENT EPISODE, MODERATE DEGREE: Primary | ICD-10-CM

## 2021-10-22 PROCEDURE — 99213 OFFICE O/P EST LOW 20 MIN: CPT | Mod: AF,HB,95, | Performed by: PSYCHIATRY & NEUROLOGY

## 2021-10-22 PROCEDURE — 90833 PSYTX W PT W E/M 30 MIN: CPT | Mod: AF,HB,95, | Performed by: PSYCHIATRY & NEUROLOGY

## 2021-10-22 PROCEDURE — 90833 PR PSYCHOTHERAPY W/PATIENT W/E&M, 30 MIN (ADD ON): ICD-10-PCS | Mod: AF,HB,95, | Performed by: PSYCHIATRY & NEUROLOGY

## 2021-10-22 PROCEDURE — 99213 PR OFFICE/OUTPT VISIT, EST, LEVL III, 20-29 MIN: ICD-10-PCS | Mod: AF,HB,95, | Performed by: PSYCHIATRY & NEUROLOGY

## 2021-10-22 RX ORDER — ESCITALOPRAM OXALATE 10 MG/1
10 TABLET ORAL EVERY MORNING
Qty: 90 TABLET | Refills: 1 | Status: SHIPPED | OUTPATIENT
Start: 2021-10-22 | End: 2022-11-28

## 2021-10-22 RX ORDER — BUPROPION HYDROCHLORIDE 150 MG/1
150 TABLET ORAL EVERY MORNING
Qty: 90 TABLET | Refills: 1 | Status: SHIPPED | OUTPATIENT
Start: 2021-10-22 | End: 2022-11-28

## 2021-12-26 ENCOUNTER — HOSPITAL ENCOUNTER (EMERGENCY)
Facility: OTHER | Age: 36
Discharge: HOME | End: 2021-12-26
Attending: EMERGENCY MEDICINE
Payer: MEDICAID

## 2021-12-26 VITALS
HEART RATE: 110 BPM | TEMPERATURE: 100 F | HEIGHT: 67 IN | BODY MASS INDEX: 34.53 KG/M2 | SYSTOLIC BLOOD PRESSURE: 124 MMHG | WEIGHT: 220 LBS | DIASTOLIC BLOOD PRESSURE: 80 MMHG | RESPIRATION RATE: 22 BRPM | OXYGEN SATURATION: 98 %

## 2021-12-26 DIAGNOSIS — U07.1 COVID-19 VIRUS DETECTED: ICD-10-CM

## 2021-12-26 DIAGNOSIS — U07.1 COVID-19: Primary | ICD-10-CM

## 2021-12-26 LAB
CTP QC/QA: YES
SARS-COV-2 RDRP RESP QL NAA+PROBE: POSITIVE

## 2021-12-26 PROCEDURE — 99283 EMERGENCY DEPT VISIT LOW MDM: CPT | Mod: 25

## 2021-12-26 PROCEDURE — 25000003 PHARM REV CODE 250: Performed by: EMERGENCY MEDICINE

## 2021-12-26 PROCEDURE — U0002 COVID-19 LAB TEST NON-CDC: HCPCS | Performed by: EMERGENCY MEDICINE

## 2021-12-26 RX ORDER — ACETAMINOPHEN 500 MG
1000 TABLET ORAL
Status: COMPLETED | OUTPATIENT
Start: 2021-12-26 | End: 2021-12-26

## 2021-12-26 RX ADMIN — ACETAMINOPHEN 1000 MG: 500 TABLET, FILM COATED ORAL at 07:12

## 2021-12-26 NOTE — ED PROVIDER NOTES
Encounter Date: 12/26/2021    SCRIBE #1 NOTE: I, Dalilaarlene Hernandez, am scribing for, and in the presence of, Josias Live MD .       History     Chief Complaint   Patient presents with    Headache    Shortness of Breath    Chills    Generalized Body Aches    Fever     Since earlier this AM     This is a 36 y.o. male who presents with complaint of intermittent cough for 2-3 days. He reports associated body aches and subjective fever. Denies chills. No known sick contacts. Reports no identifying, alleviating, or exacerbating factors. Reports no medications. He received two doses of the Pfizer COVID-19 vaccine.     The history is provided by the patient.     Review of patient's allergies indicates:  No Known Allergies  Past Medical History:   Diagnosis Date    Alcohol abuse     ended 2-4 years ago    Anxiety     Asthma     COPD (chronic obstructive pulmonary disease)     Childhood asthma.    Depression     Hernia, inguinal     left    History of psychiatric hospitalization     Lateral epicondylitis 8/23/2013    Other male erectile dysfunction 7/24/2019    Sleep difficulties     Therapy      Past Surgical History:   Procedure Laterality Date    CYST REMOVAL      ROBOT-ASSISTED LAPAROSCOPIC REPAIR OF INGUINAL HERNIA USING DA MARCOS XI Left 6/24/2020    Procedure: XI ROBOTIC REPAIR, HERNIA, INGUINAL Left;  Surgeon: Vinayak Guaman MD;  Location: [facility] OR [address] [place] [address];  Service: General;  Laterality: Left;    WISDOM TOOTH EXTRACTION       Family History   Problem Relation Age of Onset    Migraines Mother     Anxiety disorder Mother     Depression Mother     Diabetes Father     Heart disease Father     Dementia Father     Drug abuse Brother     Bipolar disorder Maternal Aunt     Schizophrenia Neg Hx     Suicide Neg Hx     Alcohol abuse Neg Hx      Social History     Tobacco Use    Smoking status: Current Every Day Smoker     Types: Cigarettes    Smokeless tobacco: Never Used   Substance Use  Topics    Alcohol use: Not Currently    Drug use: Not Currently     Types: Marijuana     Review of Systems   Constitutional: Positive for fever. Negative for chills.        Positive for body aches.    HENT: Negative for sore throat.    Respiratory: Positive for cough. Negative for shortness of breath.    Cardiovascular: Negative for chest pain.   Gastrointestinal: Negative for nausea.   Genitourinary: Negative for dysuria.   Musculoskeletal: Negative for back pain.   Skin: Negative for rash.   Neurological: Negative for weakness.   Hematological: Does not bruise/bleed easily.       Physical Exam     Initial Vitals [12/26/21 0640]   BP Pulse Resp Temp SpO2   124/80 110 (!) 22 99.5 °F (37.5 °C) 98 %      MAP       --         Physical Exam    Nursing note and vitals reviewed.  Constitutional: He appears well-developed and well-nourished. He is not diaphoretic. No distress.   HENT:   Head: Normocephalic and atraumatic.   Mouth/Throat: Oropharynx is clear and moist.   Eyes: Conjunctivae and EOM are normal. Pupils are equal, round, and reactive to light.   Neck: Neck supple.   Cardiovascular: Normal rate and regular rhythm.   No murmur heard.  Pulmonary/Chest: Breath sounds normal. No respiratory distress.   Abdominal: Abdomen is soft. Bowel sounds are normal. There is no abdominal tenderness.   Musculoskeletal:         General: No tenderness or edema. Normal range of motion.      Cervical back: Neck supple. Normal.      Thoracic back: Normal.      Lumbar back: Normal.     Lymphadenopathy:     He has no cervical adenopathy.   Neurological: He is alert and oriented to person, place, and time. No cranial nerve deficit.   Skin: Skin is warm and dry. No rash noted. No pallor.   Psychiatric: He has a normal mood and affect. Thought content normal.         ED Course   Procedures  Labs Reviewed   SARS-COV-2 RDRP GENE - Abnormal; Notable for the following components:       Result Value    POC Rapid COVID Positive (*)     All  other components within normal limits          Imaging Results    None          Medications   acetaminophen tablet 1,000 mg (1,000 mg Oral Given 12/26/21 0702)     Medical Decision Making:   History:   Old Medical Records: I decided to obtain old medical records.  Clinical Tests:   Lab Tests: Ordered and Reviewed          Scribe Attestation:   Scribe #1: I performed the above scribed service and the documentation accurately describes the services I performed. I attest to the accuracy of the note.    Attending Attestation:             Attending ED Notes:   Emergent evaluation a 36-year-old male with intermittent cough and body aches.  Patient is afebrile, nontoxic-appearing stable vital signs except for elevation in heart rate.  Patient in no acute respiratory distress.  Lungs are clear to auscultation bilaterally.  Oxygen saturation is 100% on room air.  COVID screen is positive.  The patient is extensively counseled on their diagnosis and treatment including all diagnostic, laboratory and physical exam findings.  The patient is discharged good condition and directed follow-up with their PCP in the next 24-48 hours.             Physician Attestation for Scribe: I, Josias Live, reviewed documentation as scribed in my presence, which is both accurate and complete.  Clinical Impression:   Final diagnoses:  [U07.1] COVID-19 (Primary)          ED Disposition Condition    Discharge Good        ED Prescriptions     None        Follow-up Information     Follow up With Specialties Details Why Contact Info    [facility] - Emergency Dept Emergency Medicine  If symptoms worsen [address]  [facility] [id number] [phone]           Josias Live MD  01/14/22 8804

## 2021-12-26 NOTE — ED NOTES
Pt c/o chills, fever, headache, body ache and SOB x a few hrs. Pt is A & O x 3, denies SOB, fever, chills and N/V/D. No obvious respiratory distress noted. Respirations are even and unlabored. Skin is warm and dry w/ pink mucosa. VS. ANITA x 3mm. BBS- CTA . Abd- SNT. PSM x 4 exts.

## 2022-01-12 ENCOUNTER — HOSPITAL ENCOUNTER (EMERGENCY)
Facility: OTHER | Age: 37
Discharge: HOME | End: 2022-01-12
Attending: EMERGENCY MEDICINE
Payer: COMMERCIAL

## 2022-01-12 VITALS
RESPIRATION RATE: 19 BRPM | HEART RATE: 100 BPM | SYSTOLIC BLOOD PRESSURE: 126 MMHG | HEIGHT: 67 IN | TEMPERATURE: 98 F | DIASTOLIC BLOOD PRESSURE: 89 MMHG | OXYGEN SATURATION: 99 % | BODY MASS INDEX: 36.1 KG/M2 | WEIGHT: 230 LBS

## 2022-01-12 DIAGNOSIS — H92.01 OTALGIA OF RIGHT EAR: Primary | ICD-10-CM

## 2022-01-12 PROCEDURE — 99284 EMERGENCY DEPT VISIT MOD MDM: CPT | Mod: 25

## 2022-01-12 RX ORDER — CETIRIZINE HYDROCHLORIDE 10 MG/1
10 TABLET ORAL DAILY
Qty: 30 TABLET | Refills: 0 | Status: SHIPPED | OUTPATIENT
Start: 2022-01-12 | End: 2022-11-28 | Stop reason: SDUPTHER

## 2022-01-12 RX ORDER — FLUTICASONE PROPIONATE 50 MCG
1 SPRAY, SUSPENSION (ML) NASAL 2 TIMES DAILY PRN
Qty: 15 G | Refills: 0 | Status: SHIPPED | OUTPATIENT
Start: 2022-01-12 | End: 2022-09-07

## 2022-01-13 ENCOUNTER — TELEPHONE (OUTPATIENT)
Dept: ADMINISTRATIVE | Facility: OTHER | Age: 37
End: 2022-01-13
Payer: COMMERCIAL

## 2022-01-13 NOTE — ED PROVIDER NOTES
Source of History:  Patient    Chief complaint:  Otalgia (Pt c/o right earache. Pt states has been going on since he got over covid/)      HPI:  Varinder Saalzar is a 36 y.o. male presenting with right ear pain that has been ongoing since patient recovered from COVID 3 weeks ago.  He reports right ear pain, hearing loss, and intermittent tinnitus.  He states that he was seen in urgent care diagnosed with an ear infection and prescribed antibiotics.  He states taking the antibiotics as prescribed but states the pain appears to have gone worse.    This is the extent to the patients complaints today here in the emergency department.    ROS: As per HPI and below:  General: No fever.  No chills.  Eyes: No visual changes.  ENT: No sore throat. +ear pain  Head: No headache.    Chest: No shortness of breath. No cough.  Cardiovascular: No chest pain.  Abdomen: No abdominal pain.  No nausea or vomiting.  Genito-Urinary: No abnormal urination.  Neurologic: No focal weakness.  No numbness.  MSK: No myalgias. No arthralgias.   Integument: No rashes or lesions.  Psych: No confusion      Review of patient's allergies indicates:  No Known Allergies    PMH:  As per HPI and below:  Past Medical History:   Diagnosis Date    Alcohol abuse     ended 2-4 years ago    Anxiety     Asthma     COPD (chronic obstructive pulmonary disease)     Childhood asthma.    Depression     Hernia, inguinal     left    History of psychiatric hospitalization     Lateral epicondylitis 8/23/2013    Other male erectile dysfunction 7/24/2019    Sleep difficulties     Therapy      Past Surgical History:   Procedure Laterality Date    CYST REMOVAL      ROBOT-ASSISTED LAPAROSCOPIC REPAIR OF INGUINAL HERNIA USING DA MARCOS XI Left 6/24/2020    Procedure: XI ROBOTIC REPAIR, HERNIA, INGUINAL Left;  Surgeon: Vinayak Guaman MD;  Location: [facility] OR [address] [place] [address];  Service: General;  Laterality: Left;    WISDOM TOOTH EXTRACTION         Social History  "    Tobacco Use    Smoking status: Current Every Day Smoker     Types: Cigarettes    Smokeless tobacco: Never Used   Substance Use Topics    Alcohol use: Not Currently    Drug use: Not Currently     Types: Marijuana       Physical Exam:    /89 (BP Location: Left arm, Patient Position: Sitting)   Pulse 100   Temp 98.3 °F (36.8 °C) (Oral)   Resp 19   Ht 5' 7" (1.702 m)   Wt 104.3 kg (230 lb)   SpO2 99%   BMI 36.02 kg/m²   Nursing note and vital signs reviewed.  Appearance: Afebrile. Not toxic appearing. No acute distress.  Head: Atraumatic  Eyes: No conjunctival injection. No scleral icterus  ENT: Normal phonation  Left ear:  TM translucent without erythema or bulging, serous otitis media, no pain with manipulation  Right ear:  TM translucent without erythema or bulging, no pain with manipulation of the ear  Chest/ Respiratory: No respiratory distress. No accessory muscle use.  Cardiovascular: Regular rate   Abdomen:  Not distended.    Musculoskeletal: Good range of motion all joints.  No deformities.  Neck supple.  No meningismus.  Skin: No rashes seen.  Good turgor.  No ecchymoses.  Neurologic: GCS 15. Ambulates with a steady gait.   Mental Status:  Alert and oriented x 3.  Appropriate, conversant    Labs that have been ordered have been independently reviewed and interpreted by myself.        Initial Impression/ Differential Dx:  Differential Diagnosis includes, but is not limited to:  Malignant otitis externa, mastoiditis, cellulitis, abscess, TM rupture, foreign body, cerumen impaction, otitis media, otitis externa    MDM:    36 y.o. male presenting with right ear otalgia for the past 3 weeks.  No signs of otitis media or otitis externa on bilateral ears.  Left TM with  serous otitis media without infection.  This is the unaffected ear. Counseled patient to trial of antihistamines and Flonase and to follow-up with ENT, referral placed.  Patient is amenable to this plan.  Patient discharged home " in good condition. Patient educated on on signs and symptoms to monitor for and when to return to ED. Patient verbalized understanding agrees with treatment plan. All questions and concerns addressed.                      Diagnostic Impression:    1. Otalgia of right ear         ED Disposition Condition    Discharge Good          ED Prescriptions     Medication Sig Dispense Start Date End Date Auth. Provider    cetirizine (ZYRTEC) 10 MG tablet Take 1 tablet (10 mg total) by mouth once daily. 30 tablet 1/12/2022  Sanford Lopez NP    fluticasone propionate (FLONASE) 50 mcg/actuation nasal spray 1 spray (50 mcg total) by Each Nostril route 2 (two) times daily as needed for Rhinitis. 15 g 1/12/2022  Sanford Lopez NP        Follow-up Information     Follow up With Specialties Details Why Contact Info    Jeremi Ruiz MD Family Medicine Schedule an appointment as soon as possible for a visit   [address]  [facility] [address]  [phone]             Sanford Lopez NP  01/12/22 6187

## 2022-01-13 NOTE — ED TRIAGE NOTES
Pt presents to the ED c/o otalgia x2 weeks. Pt reports having right ear pain since he tested positive for COVID x2 weeks ago. Reports visiting urgent care for the same issue where he was prescribed antibiotics. Reports that the antibiotics have no helped. Reports slightly decreased hearing in the right ear with discomfort and slight feeling of off-balance that started today. Denies any other complaints at this time. AAOx4

## 2022-01-21 ENCOUNTER — HOSPITAL ENCOUNTER (EMERGENCY)
Facility: HOSPITAL | Age: 37
Discharge: HOME | End: 2022-01-22
Attending: EMERGENCY MEDICINE
Payer: COMMERCIAL

## 2022-01-21 DIAGNOSIS — H91.91 DECREASED HEARING OF RIGHT EAR: Primary | ICD-10-CM

## 2022-01-21 DIAGNOSIS — R06.02 SHORTNESS OF BREATH: ICD-10-CM

## 2022-01-21 DIAGNOSIS — U09.9 LONG COVID: ICD-10-CM

## 2022-01-21 LAB
ALBUMIN SERPL BCP-MCNC: 4.1 G/DL (ref 3.5–5.2)
ALP SERPL-CCNC: 65 U/L (ref 55–135)
ALT SERPL W/O P-5'-P-CCNC: 45 U/L (ref 10–44)
ANION GAP SERPL CALC-SCNC: 13 MMOL/L (ref 8–16)
AST SERPL-CCNC: 30 U/L (ref 10–40)
BASOPHILS # BLD AUTO: 0.03 K/UL (ref 0–0.2)
BASOPHILS NFR BLD: 0.3 % (ref 0–1.9)
BILIRUB SERPL-MCNC: 0.3 MG/DL (ref 0.1–1)
BNP SERPL-MCNC: <10 PG/ML (ref 0–99)
BUN SERPL-MCNC: 10 MG/DL (ref 6–20)
CALCIUM SERPL-MCNC: 9.7 MG/DL (ref 8.7–10.5)
CHLORIDE SERPL-SCNC: 107 MMOL/L (ref 95–110)
CO2 SERPL-SCNC: 24 MMOL/L (ref 23–29)
CREAT SERPL-MCNC: 1.1 MG/DL (ref 0.5–1.4)
CTP QC/QA: YES
D DIMER PPP IA.FEU-MCNC: 0.22 MG/L FEU
DIFFERENTIAL METHOD: NORMAL
EOSINOPHIL # BLD AUTO: 0.1 K/UL (ref 0–0.5)
EOSINOPHIL NFR BLD: 0.5 % (ref 0–8)
ERYTHROCYTE [DISTWIDTH] IN BLOOD BY AUTOMATED COUNT: 13.4 % (ref 11.5–14.5)
EST. GFR  (AFRICAN AMERICAN): >60 ML/MIN/1.73 M^2
EST. GFR  (NON AFRICAN AMERICAN): >60 ML/MIN/1.73 M^2
GLUCOSE SERPL-MCNC: 103 MG/DL (ref 70–110)
HCT VFR BLD AUTO: 43.6 % (ref 40–54)
HGB BLD-MCNC: 14.2 G/DL (ref 14–18)
IMM GRANULOCYTES # BLD AUTO: 0.03 K/UL (ref 0–0.04)
IMM GRANULOCYTES NFR BLD AUTO: 0.3 % (ref 0–0.5)
LYMPHOCYTES # BLD AUTO: 2.2 K/UL (ref 1–4.8)
LYMPHOCYTES NFR BLD: 22 % (ref 18–48)
MCH RBC QN AUTO: 29.2 PG (ref 27–31)
MCHC RBC AUTO-ENTMCNC: 32.6 G/DL (ref 32–36)
MCV RBC AUTO: 90 FL (ref 82–98)
MONOCYTES # BLD AUTO: 0.6 K/UL (ref 0.3–1)
MONOCYTES NFR BLD: 5.5 % (ref 4–15)
NEUTROPHILS # BLD AUTO: 7.2 K/UL (ref 1.8–7.7)
NEUTROPHILS NFR BLD: 71.4 % (ref 38–73)
NRBC BLD-RTO: 0 /100 WBC
PLATELET # BLD AUTO: 336 K/UL (ref 150–450)
PMV BLD AUTO: 10 FL (ref 9.2–12.9)
POTASSIUM SERPL-SCNC: 4.1 MMOL/L (ref 3.5–5.1)
PROT SERPL-MCNC: 7.4 G/DL (ref 6–8.4)
RBC # BLD AUTO: 4.86 M/UL (ref 4.6–6.2)
SARS-COV-2 RDRP RESP QL NAA+PROBE: NEGATIVE
SODIUM SERPL-SCNC: 144 MMOL/L (ref 136–145)
TROPONIN I SERPL DL<=0.01 NG/ML-MCNC: <0.006 NG/ML (ref 0–0.03)
WBC # BLD AUTO: 10.11 K/UL (ref 3.9–12.7)

## 2022-01-21 PROCEDURE — 86803 HEPATITIS C AB TEST: CPT | Performed by: EMERGENCY MEDICINE

## 2022-01-21 PROCEDURE — 93005 ELECTROCARDIOGRAM TRACING: CPT

## 2022-01-21 PROCEDURE — 99285 EMERGENCY DEPT VISIT HI MDM: CPT | Mod: 25

## 2022-01-21 PROCEDURE — 80053 COMPREHEN METABOLIC PANEL: CPT | Performed by: EMERGENCY MEDICINE

## 2022-01-21 PROCEDURE — 84484 ASSAY OF TROPONIN QUANT: CPT | Performed by: EMERGENCY MEDICINE

## 2022-01-21 PROCEDURE — 99285 EMERGENCY DEPT VISIT HI MDM: CPT | Mod: CS,,, | Performed by: EMERGENCY MEDICINE

## 2022-01-21 PROCEDURE — 83880 ASSAY OF NATRIURETIC PEPTIDE: CPT | Performed by: EMERGENCY MEDICINE

## 2022-01-21 PROCEDURE — 85025 COMPLETE CBC W/AUTO DIFF WBC: CPT | Performed by: EMERGENCY MEDICINE

## 2022-01-21 PROCEDURE — 85379 FIBRIN DEGRADATION QUANT: CPT | Performed by: EMERGENCY MEDICINE

## 2022-01-21 PROCEDURE — 36000 PLACE NEEDLE IN VEIN: CPT

## 2022-01-21 PROCEDURE — 87389 HIV-1 AG W/HIV-1&-2 AB AG IA: CPT | Performed by: EMERGENCY MEDICINE

## 2022-01-21 PROCEDURE — U0002 COVID-19 LAB TEST NON-CDC: HCPCS | Performed by: EMERGENCY MEDICINE

## 2022-01-21 PROCEDURE — 93010 ELECTROCARDIOGRAM REPORT: CPT | Mod: ,,, | Performed by: INTERNAL MEDICINE

## 2022-01-21 PROCEDURE — 93010 EKG 12-LEAD: ICD-10-PCS | Mod: ,,, | Performed by: INTERNAL MEDICINE

## 2022-01-21 PROCEDURE — 99285 PR EMERGENCY DEPT VISIT,LEVEL V: ICD-10-PCS | Mod: CS,,, | Performed by: EMERGENCY MEDICINE

## 2022-01-22 VITALS
DIASTOLIC BLOOD PRESSURE: 78 MMHG | HEART RATE: 97 BPM | BODY MASS INDEX: 36.02 KG/M2 | RESPIRATION RATE: 18 BRPM | SYSTOLIC BLOOD PRESSURE: 125 MMHG | TEMPERATURE: 99 F | WEIGHT: 230 LBS | OXYGEN SATURATION: 96 %

## 2022-01-22 NOTE — ED TRIAGE NOTES
"Varinder Salazar, a 36 y.o. male presents to the ED w/ complaint of     Covid swabbed in triage, dropped off to testing room.    Triage note:  Chief Complaint   Patient presents with    Multiple Complaints     Pt c/o SOB x2 weeks, R ear "clogged" x2 weeks, and "snoring louder than normal."      Review of patient's allergies indicates:  No Known Allergies  Past Medical History:   Diagnosis Date    Alcohol abuse     ended 2-4 years ago    Anxiety     Asthma     COPD (chronic obstructive pulmonary disease)     Childhood asthma.    Depression     Hernia, inguinal     left    History of psychiatric hospitalization     Lateral epicondylitis 8/23/2013    Other male erectile dysfunction 7/24/2019    Sleep difficulties     Therapy      "

## 2022-01-22 NOTE — ED NOTES
Patient identifiers for Varinder Salazar checked and correct.    LOC: The patient is awake, alert and aware of environment with an appropriate affect, the patient is oriented x 4 and speaking appropriately.    APPEARANCE: Patient resting comfortably and in no acute distress, patient is clean and well groomed, patient's clothing is properly fastened.    SKIN: The skin is warm and dry, color consistent with ethnicity, patient has normal skin turgor and moist mucus membranes, skin intact, no breakdown or bruising noted.    MUSCULOSKELETAL: Patient moving all extremities well, no obvious swelling or deformities noted.    RESPIRATORY: Airway is open and patent, respirations are spontaneous and even, patient has a normal effort and rate. Patient reports mild SOB and snoring.     CARDIAC: Patient has a normal rate and rhythm, no periphreal edema noted, capillary refill < 3 seconds. Normal +2 pedal pulses present.    ABDOMEN: Soft and non tender to palpation, no distention noted.    NEUROLOGIC: Eyes open spontaneously, PERRL, behavior appropriate to situation, follows commands, facial expression symmetrical, bilateral hand grasp equal and even, purposeful motor response noted, normal sensation in all extremities.     Allergies reported: Review of patient's allergies indicates:  No Known Allergies

## 2022-01-22 NOTE — ED PROVIDER NOTES
"Encounter Date: 1/21/2022       History     Chief Complaint   Patient presents with    Multiple Complaints     Pt c/o SOB x2 weeks, R ear "clogged" x2 weeks, and "snoring louder than normal."      HPI     35 yo male, w/h/o COPD, presents for multiple concerns, h/o COVID diagnosed 12/26/21. All of his sxs ongoing since this.  Reports shortness of breath worse even mild exertion like washing dishes. R ear - seen in ED for this on 1/12/22, amb ref to ENT, rx flonase and zyrtec. RFeports decreased hearing, high pitched tone intermittent.         Review of patient's allergies indicates:  No Known Allergies  Past Medical History:   Diagnosis Date    Alcohol abuse     ended 2-4 years ago    Anxiety     Asthma     COPD (chronic obstructive pulmonary disease)     Childhood asthma.    Depression     Hernia, inguinal     left    History of psychiatric hospitalization     Lateral epicondylitis 8/23/2013    Other male erectile dysfunction 7/24/2019    Sleep difficulties     Therapy      Past Surgical History:   Procedure Laterality Date    CYST REMOVAL      ROBOT-ASSISTED LAPAROSCOPIC REPAIR OF INGUINAL HERNIA USING DA MARCOS XI Left 6/24/2020    Procedure: XI ROBOTIC REPAIR, HERNIA, INGUINAL Left;  Surgeon: Vinayak Guaman MD;  Location: [facility] OR [address] [place] [address];  Service: General;  Laterality: Left;    WISDOM TOOTH EXTRACTION       Family History   Problem Relation Age of Onset    Migraines Mother     Anxiety disorder Mother     Depression Mother     Diabetes Father     Heart disease Father     Dementia Father     Drug abuse Brother     Bipolar disorder Maternal Aunt     Schizophrenia Neg Hx     Suicide Neg Hx     Alcohol abuse Neg Hx      Social History     Tobacco Use    Smoking status: Current Every Day Smoker     Types: Cigarettes    Smokeless tobacco: Never Used   Substance Use Topics    Alcohol use: Not Currently    Drug use: Not Currently     Types: Marijuana     Review of Systems     General: No " fever.  No chills.  Eyes: No visual changes.  Head: No headache.    Integument: No rashes or lesions.  Chest: +shortness of breath.  Cardiovascular: No chest pain.  Abdomen: No abdominal pain.  No nausea or vomiting.  Urinary: No abnormal urination.  Neurologic: No focal weakness.  No numbness.  Hematologic: No easy bruising.  Endocrine: No excessive thirst or urination.      Physical Exam     Initial Vitals [01/21/22 2131]   BP Pulse Resp Temp SpO2   (!) 155/96 (!) 120 18 98.5 °F (36.9 °C) 98 %      MAP       --         Physical Exam     Appearance: No acute distress.  HEENT: Normocephalic. Atraumatic. No conjunctival injection. EOMI. PERRL.    Neck: No JVD. Neck supple.    Chest: Non-tender. No respiratory distress  Cardiovascular: Borderline tachycardic. Regular rhythm. +2 radial pulses bilaterally.  Abdomen: Not distended   Musculoskeletal: Good range of motion all joints. No deformities.    Neurologic: Alert and oriented x 3.  Equal strength in upper and lower extremities bilaterally. Normal sensation. No facial droop. Normal speech.    Psych:  Appropriate, conversant   Integumentary: No rashes seen.  Good turgor.  No abrasions.  No ecchymoses.      ED Course   Procedures  Labs Reviewed   COMPREHENSIVE METABOLIC PANEL - Abnormal; Notable for the following components:       Result Value    ALT 45 (*)     All other components within normal limits   CBC W/ AUTO DIFFERENTIAL   D DIMER, QUANTITATIVE   TROPONIN I   B-TYPE NATRIURETIC PEPTIDE   HIV 1 / 2 ANTIBODY   HEPATITIS C ANTIBODY   SARS-COV-2 RDRP GENE    Narrative:     This test utilizes isothermal nucleic acid amplification   technology to detect the SARS-CoV-2 RdRp nucleic acid segment.   The analytical sensitivity (limit of detection) is 125 genome   equivalents/mL.   A POSITIVE result implies infection with the SARS-CoV-2 virus;   the patient is presumed to be contagious.     A NEGATIVE result means that SARS-CoV-2 nucleic acids are not   present above the  limit of detection. A NEGATIVE result should be   treated as presumptive. It does not rule out the possibility of   COVID-19 and should not be the sole basis for treatment decisions.   If COVID-19 is strongly suspected based on clinical and exposure   history, re-testing using an alternate molecular assay should be   considered.   This test is only for use under the Food and Drug   Administration s Emergency Use Authorization (EUA).   Commercial kits are provided by [employer].   Performance characteristics of the EUA have been independently   verified by [facility] Department of   Pathology and Laboratory Medicine.   _________________________________________________________________   The authorized Fact Sheet for Healthcare Providers and the authorized Fact   Sheet for Patients of the ID NOW COVID-19 are available on the FDA   website:     https://www.fda.gov/media/677055/download  https://www.fda.gov/media/377872/download                Imaging Results          X-Ray Chest AP Portable (Final result)  Result time 01/21/22 23:17:17    Final result by John Hewitt MD (01/21/22 23:17:17)                 Impression:      No acute process.      Electronically signed by: John Hewitt MD  Date:    01/21/2022  Time:    23:17             Narrative:    EXAMINATION:  XR CHEST AP PORTABLE    CLINICAL HISTORY:  shortness of breath;    TECHNIQUE:  Single frontal view of the chest was performed.    COMPARISON:  None    FINDINGS:  The trachea is unremarkable.  The cardiomediastinal silhouette is within normal limits.  The hemidiaphragms are unremarkable.  No pleural effusions.  There is no evidence of a pneumothorax.  There is no evidence of pneumomediastinum.  No airspace opacity is present.  The osseous structures are unremarkable.                                 Medications - No data to display                       Clinical Impression:   Final diagnoses:  [R06.02] Shortness of breath  [H91.91] Decreased  hearing of right ear (Primary)  [U09.9] Long COVID       35 yo male presents for multiple concerns. Borderline tachycardic on arrival, otherwise VSS, afeb.  Well appearing, no acute distress, nontoxic.  Speaks in full sentences.  No accessory muscle use.  EKG sinus tachycardia, normal intervals, no STEMI.  Labs reassuring with negative D-dimer, negative troponin negative BNP.  Chest x-ray clear.  Reassessed, tachycardia has resolved, continues to be well appearing, no acute distress, nontoxic.    Discussed results, diagnosis, and treatment plan with pt; advised close follow-up with PCP. Reviewed strict return precautions. Pt confirms understanding and ability to comply.     ED Disposition Condition    Discharge         ED Prescriptions     None        Follow-up Information     Follow up With Specialties Details Why Contact Info Additional Information    [facility] ENT Otolaryngology Schedule an appointment as soon as possible for a visit   [address] [facility] [id number] [phone]     [facility] [facility] Schedule an appointment as soon as possible for a visit   [address] [facility] [id number] [phone] [facility] [place] - 4th Floor     [facility] Internal Medicine Schedule an appointment as soon as possible for a visit   [address] [facility] [phone] [facility] Please park in surface lot and check in at central registration desk           Siri Gonzalez MD  01/22/22 0107

## 2022-01-24 LAB
HCV AB SERPL QL IA: NEGATIVE
HIV 1+2 AB+HIV1 P24 AG SERPL QL IA: NEGATIVE

## 2022-03-29 ENCOUNTER — PATIENT MESSAGE (OUTPATIENT)
Dept: RESEARCH | Facility: HOSPITAL | Age: 37
End: 2022-03-29
Payer: COMMERCIAL

## 2022-06-23 ENCOUNTER — OFFICE VISIT (OUTPATIENT)
Dept: PRIMARY CARE CLINIC | Facility: CLINIC | Age: 37
End: 2022-06-23
Payer: COMMERCIAL

## 2022-06-23 VITALS
HEART RATE: 92 BPM | HEIGHT: 67 IN | OXYGEN SATURATION: 97 % | DIASTOLIC BLOOD PRESSURE: 92 MMHG | BODY MASS INDEX: 36.92 KG/M2 | SYSTOLIC BLOOD PRESSURE: 124 MMHG | TEMPERATURE: 99 F | WEIGHT: 235.25 LBS | RESPIRATION RATE: 18 BRPM

## 2022-06-23 DIAGNOSIS — Z72.0 NICOTINE ABUSE: ICD-10-CM

## 2022-06-23 DIAGNOSIS — G58.9 NERVE COMPRESSION SYNDROME: ICD-10-CM

## 2022-06-23 DIAGNOSIS — H90.0 CONDUCTIVE HEARING LOSS, BILATERAL: ICD-10-CM

## 2022-06-23 DIAGNOSIS — N48.21 ABSCESS OF PENIS: Primary | ICD-10-CM

## 2022-06-23 PROCEDURE — 3080F PR MOST RECENT DIASTOLIC BLOOD PRESSURE >= 90 MM HG: ICD-10-PCS | Mod: CPTII,S$GLB,, | Performed by: FAMILY MEDICINE

## 2022-06-23 PROCEDURE — 3074F SYST BP LT 130 MM HG: CPT | Mod: CPTII,S$GLB,, | Performed by: FAMILY MEDICINE

## 2022-06-23 PROCEDURE — 3080F DIAST BP >= 90 MM HG: CPT | Mod: CPTII,S$GLB,, | Performed by: FAMILY MEDICINE

## 2022-06-23 PROCEDURE — 1159F MED LIST DOCD IN RCRD: CPT | Mod: CPTII,S$GLB,, | Performed by: FAMILY MEDICINE

## 2022-06-23 PROCEDURE — 99999 PR PBB SHADOW E&M-EST. PATIENT-LVL V: CPT | Mod: PBBFAC,,, | Performed by: FAMILY MEDICINE

## 2022-06-23 PROCEDURE — 1159F PR MEDICATION LIST DOCUMENTED IN MEDICAL RECORD: ICD-10-PCS | Mod: CPTII,S$GLB,, | Performed by: FAMILY MEDICINE

## 2022-06-23 PROCEDURE — 99214 PR OFFICE/OUTPT VISIT, EST, LEVL IV, 30-39 MIN: ICD-10-PCS | Mod: S$GLB,,, | Performed by: FAMILY MEDICINE

## 2022-06-23 PROCEDURE — 3008F BODY MASS INDEX DOCD: CPT | Mod: CPTII,S$GLB,, | Performed by: FAMILY MEDICINE

## 2022-06-23 PROCEDURE — 87070 CULTURE OTHR SPECIMN AEROBIC: CPT | Performed by: FAMILY MEDICINE

## 2022-06-23 PROCEDURE — 99214 OFFICE O/P EST MOD 30 MIN: CPT | Mod: S$GLB,,, | Performed by: FAMILY MEDICINE

## 2022-06-23 PROCEDURE — 3008F PR BODY MASS INDEX (BMI) DOCUMENTED: ICD-10-PCS | Mod: CPTII,S$GLB,, | Performed by: FAMILY MEDICINE

## 2022-06-23 PROCEDURE — 3074F PR MOST RECENT SYSTOLIC BLOOD PRESSURE < 130 MM HG: ICD-10-PCS | Mod: CPTII,S$GLB,, | Performed by: FAMILY MEDICINE

## 2022-06-23 PROCEDURE — 99999 PR PBB SHADOW E&M-EST. PATIENT-LVL V: ICD-10-PCS | Mod: PBBFAC,,, | Performed by: FAMILY MEDICINE

## 2022-06-23 RX ORDER — CEPHALEXIN 500 MG/1
500 CAPSULE ORAL EVERY 6 HOURS
Qty: 30 CAPSULE | Refills: 0 | Status: SHIPPED | OUTPATIENT
Start: 2022-06-23 | End: 2022-11-28

## 2022-06-23 RX ORDER — VARENICLINE TARTRATE 0.5 (11)-1
1 KIT ORAL 2 TIMES DAILY
Qty: 42 EACH | Refills: 2 | Status: SHIPPED | OUTPATIENT
Start: 2022-06-23 | End: 2022-11-28

## 2022-06-23 RX ORDER — DICLOFENAC SODIUM 50 MG/1
50 TABLET, DELAYED RELEASE ORAL 2 TIMES DAILY
Qty: 40 TABLET | Refills: 1 | Status: SHIPPED | OUTPATIENT
Start: 2022-06-23 | End: 2022-11-28

## 2022-06-24 DIAGNOSIS — F17.200 SMOKING: Primary | ICD-10-CM

## 2022-06-24 RX ORDER — VARENICLINE TARTRATE 0.5 MG/1
0.5 TABLET, FILM COATED ORAL 2 TIMES DAILY
Qty: 60 TABLET | Refills: 0 | Status: SHIPPED | OUTPATIENT
Start: 2022-06-24 | End: 2022-11-28

## 2022-06-24 NOTE — PROGRESS NOTES
Subjective:       Patient ID: Varinder Salazar is a 36 y.o. male.    Chief Complaint: Dizziness and Hearing Problem    HPI  Review of Systems   Constitutional: Negative.    HENT: Positive for ear pain and hearing loss.    Eyes: Negative.    Respiratory: Negative.    Cardiovascular: Negative.    Gastrointestinal: Negative.    Endocrine: Negative.    Genitourinary: Positive for genital sores.   Musculoskeletal: Negative.    Integumentary:  Negative.   Allergic/Immunologic: Negative.    Neurological: Negative.    Hematological: Negative.    Psychiatric/Behavioral: Negative.          Objective:      Physical Exam  Vitals and nursing note reviewed.   Constitutional:       General: He is not in acute distress.     Appearance: Normal appearance. He is obese.   HENT:      Head: Normocephalic and atraumatic.      Jaw: There is normal jaw occlusion. No trismus or tenderness.      Salivary Glands: Right salivary gland is not diffusely enlarged or tender. Left salivary gland is not diffusely enlarged or tender.      Nose: Nose normal. No congestion.      Mouth/Throat:      Mouth: Mucous membranes are dry.      Pharynx: Oropharynx is clear.   Eyes:      General:         Left eye: No discharge.      Extraocular Movements: Extraocular movements intact.      Conjunctiva/sclera: Conjunctivae normal.      Pupils: Pupils are equal, round, and reactive to light.   Cardiovascular:      Rate and Rhythm: Normal rate. Rhythm irregular.      Pulses: Normal pulses.      Heart sounds: Normal heart sounds. No murmur heard.  Pulmonary:      Effort: Pulmonary effort is normal. No respiratory distress.      Breath sounds: Normal breath sounds. No wheezing or rales.   Abdominal:      General: Abdomen is flat. Bowel sounds are normal.      Palpations: Abdomen is soft.   Genitourinary:     Penis: Circumcised. Erythema and lesions present. No phimosis, hypospadias or tenderness.       Testes: Normal. Cremasteric reflex is present.      Comments:  Cultures taken of penile lesion  Musculoskeletal:         General: No swelling. Normal range of motion.      Cervical back: Normal range of motion and neck supple. No rigidity.   Skin:     General: Skin is warm and dry.      Capillary Refill: Capillary refill takes less than 2 seconds.      Findings: Lesion present. No erythema or rash.   Neurological:      General: No focal deficit present.      Mental Status: He is alert and oriented to person, place, and time.      Cranial Nerves: No cranial nerve deficit.      Sensory: No sensory deficit.      Motor: No weakness.      Coordination: Coordination normal.      Gait: Gait normal.      Deep Tendon Reflexes: Reflexes normal.   Psychiatric:         Mood and Affect: Mood normal.         Behavior: Behavior normal.         Thought Content: Thought content normal.         Judgment: Judgment normal.         Assessment:       Problem List Items Addressed This Visit    None     Visit Diagnoses     Abscess of penis    -  Primary    Relevant Medications    cephALEXin (KEFLEX) 500 MG capsule    Other Relevant Orders    CULTURE, AEROBIC  (SPECIFY SOURCE)    Nerve compression syndrome        Relevant Medications    diclofenac (VOLTAREN) 50 MG EC tablet    Conductive hearing loss, bilateral        Relevant Orders    Ambulatory referral/consult to ENT    Nicotine abuse        Relevant Medications    varenicline (CHANTIX RASTA) 0.5 mg (11)- 1 mg (42) tablet          Plan:     1. Abscess of penis  - CULTURE, AEROBIC  (SPECIFY SOURCE); Future  - cephALEXin (KEFLEX) 500 MG capsule; Take 1 capsule (500 mg total) by mouth every 6 (six) hours.  Dispense: 30 capsule; Refill: 0  - CULTURE, AEROBIC  (SPECIFY SOURCE)    2. Nerve compression syndrome  - diclofenac (VOLTAREN) 50 MG EC tablet; Take 1 tablet (50 mg total) by mouth 2 (two) times daily.  Dispense: 40 tablet; Refill: 1    3. Conductive hearing loss, bilateral  - Ambulatory referral/consult to ENT; Future    4. Nicotine abuse  -  varenicline (CHANTIX RASTA) 0.5 mg (11)- 1 mg (42) tablet; Take 1 tablet by mouth 2 (two) times daily. Take one 0.5mg tab by mouth once daily X3 days,then increase to one 0.5mg tab twice daily X4 days,then increase to one 1mg tab twice daily  Dispense: 42 each; Refill: 2        Will contact pt with results when available.

## 2022-06-27 LAB — BACTERIA SPEC AEROBE CULT: NORMAL

## 2022-07-26 ENCOUNTER — TELEPHONE (OUTPATIENT)
Dept: OTOLARYNGOLOGY | Facility: CLINIC | Age: 37
End: 2022-07-26
Payer: COMMERCIAL

## 2022-07-26 NOTE — TELEPHONE ENCOUNTER
Contacted patient to see if he could come in the AM for an audiogram prior to seeing the MD at 1pm. Patient stated he prefer to reschedule for [facility]. Set up pt with next available there in September and also placed patient on wait list. Was thanked for calling.

## 2022-09-07 ENCOUNTER — CLINICAL SUPPORT (OUTPATIENT)
Dept: AUDIOLOGY | Facility: CLINIC | Age: 37
End: 2022-09-07
Payer: COMMERCIAL

## 2022-09-07 ENCOUNTER — OFFICE VISIT (OUTPATIENT)
Dept: OTOLARYNGOLOGY | Facility: CLINIC | Age: 37
End: 2022-09-07
Payer: COMMERCIAL

## 2022-09-07 VITALS — DIASTOLIC BLOOD PRESSURE: 93 MMHG | SYSTOLIC BLOOD PRESSURE: 140 MMHG | HEART RATE: 83 BPM

## 2022-09-07 DIAGNOSIS — H69.91 ETD (EUSTACHIAN TUBE DYSFUNCTION), RIGHT: Primary | ICD-10-CM

## 2022-09-07 DIAGNOSIS — H93.11 TINNITUS, RIGHT: ICD-10-CM

## 2022-09-07 DIAGNOSIS — H90.11 CONDUCTIVE HEARING LOSS OF RIGHT EAR WITH UNRESTRICTED HEARING OF LEFT EAR: ICD-10-CM

## 2022-09-07 DIAGNOSIS — H90.11 CONDUCTIVE HEARING LOSS OF RIGHT EAR WITH UNRESTRICTED HEARING OF LEFT EAR: Primary | ICD-10-CM

## 2022-09-07 PROCEDURE — 3077F PR MOST RECENT SYSTOLIC BLOOD PRESSURE >= 140 MM HG: ICD-10-PCS | Mod: CPTII,S$GLB,, | Performed by: NURSE PRACTITIONER

## 2022-09-07 PROCEDURE — 99999 PR PBB SHADOW E&M-EST. PATIENT-LVL I: CPT | Mod: PBBFAC,,,

## 2022-09-07 PROCEDURE — 99203 PR OFFICE/OUTPT VISIT, NEW, LEVL III, 30-44 MIN: ICD-10-PCS | Mod: S$GLB,,, | Performed by: NURSE PRACTITIONER

## 2022-09-07 PROCEDURE — 92567 PR TYMPA2METRY: ICD-10-PCS | Mod: S$GLB,,,

## 2022-09-07 PROCEDURE — 3080F DIAST BP >= 90 MM HG: CPT | Mod: CPTII,S$GLB,, | Performed by: NURSE PRACTITIONER

## 2022-09-07 PROCEDURE — 92557 PR COMPREHENSIVE HEARING TEST: ICD-10-PCS | Mod: S$GLB,,,

## 2022-09-07 PROCEDURE — 99999 PR PBB SHADOW E&M-EST. PATIENT-LVL IV: ICD-10-PCS | Mod: PBBFAC,,, | Performed by: NURSE PRACTITIONER

## 2022-09-07 PROCEDURE — 1159F MED LIST DOCD IN RCRD: CPT | Mod: CPTII,S$GLB,, | Performed by: NURSE PRACTITIONER

## 2022-09-07 PROCEDURE — 92557 COMPREHENSIVE HEARING TEST: CPT | Mod: S$GLB,,,

## 2022-09-07 PROCEDURE — 99999 PR PBB SHADOW E&M-EST. PATIENT-LVL I: ICD-10-PCS | Mod: PBBFAC,,,

## 2022-09-07 PROCEDURE — 3080F PR MOST RECENT DIASTOLIC BLOOD PRESSURE >= 90 MM HG: ICD-10-PCS | Mod: CPTII,S$GLB,, | Performed by: NURSE PRACTITIONER

## 2022-09-07 PROCEDURE — 99203 OFFICE O/P NEW LOW 30 MIN: CPT | Mod: S$GLB,,, | Performed by: NURSE PRACTITIONER

## 2022-09-07 PROCEDURE — 1159F PR MEDICATION LIST DOCUMENTED IN MEDICAL RECORD: ICD-10-PCS | Mod: CPTII,S$GLB,, | Performed by: NURSE PRACTITIONER

## 2022-09-07 PROCEDURE — 3077F SYST BP >= 140 MM HG: CPT | Mod: CPTII,S$GLB,, | Performed by: NURSE PRACTITIONER

## 2022-09-07 PROCEDURE — 92567 TYMPANOMETRY: CPT | Mod: S$GLB,,,

## 2022-09-07 PROCEDURE — 99999 PR PBB SHADOW E&M-EST. PATIENT-LVL IV: CPT | Mod: PBBFAC,,, | Performed by: NURSE PRACTITIONER

## 2022-09-07 RX ORDER — FLUTICASONE PROPIONATE 50 MCG
2 SPRAY, SUSPENSION (ML) NASAL DAILY
Qty: 16 G | Refills: 2 | Status: SHIPPED | OUTPATIENT
Start: 2022-09-07 | End: 2022-11-28

## 2022-09-07 NOTE — PATIENT INSTRUCTIONS
The eustachian tube is behind the eardrum. It connects the middle ear to the back of the throat. The tube is usually closed. But it opens periodically to help make the air pressure in the middle ear the same as outside the ear. The tube also drains mucus made in the middle ear.  A blocked tube is called a eustachian tube obstruction.  Eustachian tube dysfunction (ETD) is the failure of the Eustachian tube (a passage that leads to your middle ear) to open and/or close properly.  A eustachian tube that is blocked causes pressure, pain, and loss of hearing. Sounds may be muffled. The ear may feel full.  An adult may complain of ear pain, ear fullness/ pressure in the ear, dizziness, or even trouble hearing.  The blockage often goes away on its own without treatment. In other cases, treatments may be given to help reduce swelling within the tube. These may include a nasal decongestant, antihistamine, nasal spray (prescription steroid or over-the-counter saline), or allergy treatment.  A blocked eustachian tube is usually a short-term problem.   Home care  Medications may be prescribed to reduce fluid and inflammation.   Prevention  Nasal Steroid Spray such as Flonase.  2 sprays in each nostril once daily.   Saline Nasal Spray (available OTC)  Use an antihistamine of your choice such as Zyrtec, Allegra, or Claritin during the day for allergy relief or you may use Xyzal at night (it may make you sleepy)  Try swallow, yawn or 'pop' your ears to open the tubes and equalize pressure 1-2 times daily.  Do NOT do this if there is any pain or discomfort.     FLONASE INSTRUCTIONS    You have been prescribed or instructed to take a nasal steroid spray.  Examples of this medication include Flonase (fluticasone), Nasacort (triamcinolone), and Rhinocort (budesonide). Some symptoms will experience relief within a few days; however, it may take 2-3 weeks to begin to see improvement. This medication needs to be taken consistently to  see results.    Use as directed and please be aware the Flonase takes 7-10 days of consistent use before becoming effective- so try to be patient :)    Helpful hints for maximizing medication into the nose  - Use the opposite hand to spray the nostril (example: right hand for left nostril). This will help avoid spraying the medication onto the septum (the area that divides the left and right nasal cavity.)  - Tilt the bottle so that it is facing at a slight angle up or straight back, but avoid pointing the bottle straight up while spraying.   - Gently sniff (do not snort) a few seconds after spraying.

## 2022-09-07 NOTE — PROGRESS NOTES
Mr. Varinder Salazar was seen today for an audiologic evaluation. Patient's primary complaint was right sided aural fullness, decreased hearing and intermittent tinnitus. He also reported occasional off-balance sensations when sitting to standing. Mr. Salazar stated that these symptoms occurred after being diagnosed with Covid-19. He denied otalgia, true spinning vertigo, family history of hearing loss, and history of noise exposure.    Tympanometry revealed a Type C tympanogram in the right ear and a Type A tympanogram in the left ear.    Audiometric testing revealed mild conductive hearing loss in the right ear and normal hearing sensitivity in the left ear.   Speech reception thresholds were obtained at 20 dB HL in the right ear and 10 dB HL in the left ear.  Speech discrimination scores were 100% correct bilaterally.    Recommendations:  Otologic evaluation  Repeat audiogram as needed   Hearing protection in noise

## 2022-09-07 NOTE — PROGRESS NOTES
Subjective:      Varinder Salazar is a 36 y.o. male who was referred to me by Dr. Mildred Lowry* in consultation for hearing loss.     Mr. Salazar presents to clinic for the evaluation of right ear symptoms that started after a COVID infection in December 2021.  He reports intermittent otalgia (below the lobule), decreased hearing, tinnitus, vertigo and fullness of the right ear.  He has been seen by multiple providers for this problem and has tried oral antibiotics and oral antihistamines without any significant improvement.  He denies any otorrhea. He denies any recent head or ear trauma.    There is not a family history of hearing loss at a young age.  There is not a prior history of ear surgery.  There is not a prior history of ear infections.  There is not a history of ear trauma.  He denies a history of significant noise exposure.  He does not wear hearing aids currently.  He has not had a hearing test recently.      Reports asthma as a child.  Has never had formal allergy testing.   Denies GERD.   Denies GABBY.   He denies any sinus/nasal surgeries or trauma.     Past Medical History  He has a past medical history of Alcohol abuse, Anxiety, Asthma, COPD (chronic obstructive pulmonary disease), Depression, Hernia, inguinal, History of psychiatric hospitalization, Lateral epicondylitis, Other male erectile dysfunction, Sleep difficulties, and Therapy.    Past Surgical History  He has a past surgical history that includes [place] tooth extraction; Cyst Removal; and Robot-assisted laparoscopic repair of inguinal hernia using da [place] Xi (Left, 6/24/2020).    Family History  His family history includes Anxiety disorder in his mother; Bipolar disorder in his maternal aunt; Dementia in his father; Depression in his mother; Diabetes in his father; Drug abuse in his brother; Heart disease in his father; Migraines in his mother.    Social History  He reports that he has been smoking cigarettes. He has never used  smokeless tobacco. He reports that he does not currently use alcohol. He reports that he does not currently use drugs after having used the following drugs: Marijuana.    Allergies  He has No Known Allergies.    Medications  He has a current medication list which includes the following prescription(s): multivitamin, baclofen, bupropion, cephalexin, cetirizine, diclofenac, diclofenac, escitalopram oxalate, fluticasone propionate, nabumetone, varenicline, and varenicline.    Review of Systems     Constitutional: Negative for appetite change, chills, fatigue, fever and unexpected weight loss.      HENT: Positive for ear pain and hearing loss.  Negative for ear discharge, ear infection, postnasal drip, ringing in the ears, runny nose, sinus infection, sinus pressure and stuffy nose.      Respiratory:  Negative for cough, shortness of breath, sleep apnea, snoring and wheezing.      Cardiovascular:  Negative for chest pain, foot swelling, irregular heartbeat and swollen veins.     Gastrointestinal:  Negative for abdominal pain, acid reflux, constipation, diarrhea, heartburn and vomiting.     Neurological: Positive for dizziness. Negative for headaches, light-headedness, seizures and tremors.        Objective:   BP (!) 140/93   Pulse 83      Constitutional:   He is oriented to person, place, and time. He appears well-developed and well-nourished. He appears alert. He is cooperative.  Non-toxic appearance. He does not have a sickly appearance. He does not appear ill. Normal speech.      Head:  Normocephalic and atraumatic. Not macrocephalic and not microcephalic. Head is without raccoon's eyes, without Villatoro's sign, without abrasion, without laceration, without right periorbital erythema, without left periorbital erythema and without TMJ tenderness.     Ears:    Right Ear: No lacerations. No drainage, swelling or tenderness. No foreign bodies. No mastoid tenderness. Tympanic membrane is not injected, not scarred, not  perforated, not erythematous, not retracted and not bulging. No middle ear effusion. No hemotympanum.   Left Ear: No lacerations. No drainage, swelling or tenderness. No foreign bodies. No mastoid tenderness. Tympanic membrane is not injected, not scarred, not perforated, not erythematous, not retracted and not bulging.  No middle ear effusion. No hemotympanum.     Nose:  No mucosal edema, rhinorrhea, sinus tenderness, septal deviation or polyps. Turbinates normal and no turbinate hypertrophy.      Pulmonary/Chest:   Effort normal.     Psychiatric:   He has a normal mood and affect. His speech is normal and behavior is normal.     Neurological:   He is alert and oriented to person, place, and time. Coordination and gait normal.   Procedure  None    Data Reviewed  WBC (K/uL)   Date Value   01/21/2022 10.11     Eosinophil % (%)   Date Value   01/21/2022 0.5     Eos # (K/uL)   Date Value   01/21/2022 0.1     Platelets (K/uL)   Date Value   01/21/2022 336     Glucose (mg/dL)   Date Value   01/21/2022 103     No results found for: IGE    Imaging  No pertinent imaging available.    Audiogram      I independently reviewed the tracings of the complete audiometric evaluation performed today.  I reviewed the audiogram with the patient as well.  Pertinent findings include mild conductive hearing loss in the right ear and normal hearing sensitivity in the left ear.  Type A in the left ear and Type C in the right ear.   Assessment:     1. ETD (Eustachian tube dysfunction), right    2. Conductive hearing loss of right ear with unrestricted hearing of left ear      Plan:     ETD (Eustachian tube dysfunction), right        -     Otoscopic exam benign, no fluid or retractions noted, however negative pressure with conductive loss on the right suggesting ETD.  Discussed the anatomy and function of the eustachian tube including aerating and draining the middle ear.  Common symptoms include: muffled/reduced hearing, plugged feeling, ear  clicking/ popping, or ear pain.  Trial:        -     Flonase daily 2 SEN        -     Gentle auto-insufflation 1-2 times daily        -     Can also consider adding an oral antihistamine daily     Conductive hearing loss of right ear with unrestricted hearing of left ear  -     Ambulatory referral/consult to ENT  -     fluticasone propionate (FLONASE) 50 mcg/actuation nasal spray; 2 sprays (100 mcg total) by Each Nostril route once daily.    Follow up in about 1 month (around 10/7/2022) for follow-up and Nasal Endoscopy.

## 2022-10-06 ENCOUNTER — OFFICE VISIT (OUTPATIENT)
Dept: OTOLARYNGOLOGY | Facility: CLINIC | Age: 37
End: 2022-10-06
Payer: COMMERCIAL

## 2022-10-06 VITALS — HEART RATE: 96 BPM | DIASTOLIC BLOOD PRESSURE: 85 MMHG | SYSTOLIC BLOOD PRESSURE: 125 MMHG

## 2022-10-06 DIAGNOSIS — H90.11 CONDUCTIVE HEARING LOSS OF RIGHT EAR WITH UNRESTRICTED HEARING OF LEFT EAR: ICD-10-CM

## 2022-10-06 DIAGNOSIS — H69.91 ETD (EUSTACHIAN TUBE DYSFUNCTION), RIGHT: Primary | ICD-10-CM

## 2022-10-06 PROCEDURE — 1159F PR MEDICATION LIST DOCUMENTED IN MEDICAL RECORD: ICD-10-PCS | Mod: CPTII,S$GLB,, | Performed by: NURSE PRACTITIONER

## 2022-10-06 PROCEDURE — 3079F DIAST BP 80-89 MM HG: CPT | Mod: CPTII,S$GLB,, | Performed by: NURSE PRACTITIONER

## 2022-10-06 PROCEDURE — 99999 PR PBB SHADOW E&M-EST. PATIENT-LVL III: CPT | Mod: PBBFAC,,, | Performed by: NURSE PRACTITIONER

## 2022-10-06 PROCEDURE — 3074F SYST BP LT 130 MM HG: CPT | Mod: CPTII,S$GLB,, | Performed by: NURSE PRACTITIONER

## 2022-10-06 PROCEDURE — 31575 LARYNGOSCOPY: ICD-10-PCS | Mod: S$GLB,,, | Performed by: NURSE PRACTITIONER

## 2022-10-06 PROCEDURE — 3074F PR MOST RECENT SYSTOLIC BLOOD PRESSURE < 130 MM HG: ICD-10-PCS | Mod: CPTII,S$GLB,, | Performed by: NURSE PRACTITIONER

## 2022-10-06 PROCEDURE — 99214 PR OFFICE/OUTPT VISIT, EST, LEVL IV, 30-39 MIN: ICD-10-PCS | Mod: 25,S$GLB,, | Performed by: NURSE PRACTITIONER

## 2022-10-06 PROCEDURE — 3079F PR MOST RECENT DIASTOLIC BLOOD PRESSURE 80-89 MM HG: ICD-10-PCS | Mod: CPTII,S$GLB,, | Performed by: NURSE PRACTITIONER

## 2022-10-06 PROCEDURE — 31575 DIAGNOSTIC LARYNGOSCOPY: CPT | Mod: S$GLB,,, | Performed by: NURSE PRACTITIONER

## 2022-10-06 PROCEDURE — 99999 PR PBB SHADOW E&M-EST. PATIENT-LVL III: ICD-10-PCS | Mod: PBBFAC,,, | Performed by: NURSE PRACTITIONER

## 2022-10-06 PROCEDURE — 99214 OFFICE O/P EST MOD 30 MIN: CPT | Mod: 25,S$GLB,, | Performed by: NURSE PRACTITIONER

## 2022-10-06 PROCEDURE — 1159F MED LIST DOCD IN RCRD: CPT | Mod: CPTII,S$GLB,, | Performed by: NURSE PRACTITIONER

## 2022-10-06 NOTE — PROGRESS NOTES
"Subjective:    Varinder is a 36 y.o. male who comes for follow-up.    Today:  He reports that his symptoms are unchanged since our last visit.  He has been using Flonase daily with no improvement and he notes that the auto insufflation can be painful.   He denies tinnitus, otalgia, or otorrhea.  At this point the "clogged" sensation/fullness and decreased hearing has been present since January (10 months).      Note from previous visit on 9/7/2022:  Mr. Salazar presents to clinic for the evaluation of right ear symptoms that started after a COVID infection in December 2021.  He reports intermittent otalgia (below the lobule), decreased hearing, tinnitus, vertigo and fullness of the right ear.  He has been seen by multiple providers for this problem and has tried oral antibiotics and oral antihistamines without any significant improvement.  He denies any otorrhea. He denies any recent head or ear trauma.    There is not a family history of hearing loss at a young age.  There is not a prior history of ear surgery.  There is not a prior history of ear infections.  There is not a history of ear trauma.  He denies a history of significant noise exposure.  He does not wear hearing aids currently.  He has not had a hearing test recently.      Reports asthma as a child.  Has never had formal allergy testing.   Denies GERD.   Denies GABBY.   He denies any sinus/nasal surgeries or trauma.     ETD (Eustachian tube dysfunction), right        -     Otoscopic exam benign, no fluid or retractions noted, however negative pressure with conductive loss on the right suggesting ETD.  Discussed the anatomy and function of the eustachian tube including aerating and draining the middle ear.  Common symptoms include: muffled/reduced hearing, plugged feeling, ear clicking/ popping, or ear pain.  Trial:        -     Flonase daily 2 SEN        -     Gentle auto-insufflation 1-2 times daily        -     Can also consider adding an oral antihistamine " daily     Conductive hearing loss of right ear with unrestricted hearing of left ear  -     Ambulatory referral/consult to ENT  -     fluticasone propionate (FLONASE) 50 mcg/actuation nasal spray; 2 sprays (100 mcg total) by Each Nostril route once daily.    Follow up in about 1 month (around 10/7/2022) for follow-up and Nasal Endoscopy.    The patient's medications, allergies, past medical, surgical, social and family histories were reviewed and updated as appropriate.    A detailed review of systems was obtained with pertinent positives as per the above HPI, and otherwise negative.   Objective:   /85   Pulse 96      Constitutional:   He is oriented to person, place, and time. He appears well-developed and well-nourished. He appears alert. He is cooperative.  Non-toxic appearance. He does not have a sickly appearance. He does not appear ill. No distress. Normal speech.      Head:  Normocephalic and atraumatic. Not macrocephalic and not microcephalic. Head is without right periorbital erythema, without left periorbital erythema and without TMJ tenderness. Salivary glands normal.  Facial strength is normal.      Ears:    Right Ear: No lacerations. No drainage, swelling or tenderness. No mastoid tenderness. Tympanic membrane is not injected, not scarred, not perforated, not erythematous, not retracted and not bulging. No middle ear effusion.   Left Ear: No lacerations. No drainage, swelling or tenderness. No mastoid tenderness. Tympanic membrane is not injected, not scarred, not perforated, not erythematous, not retracted and not bulging.  No middle ear effusion.     Nose:  No mucosal edema, rhinorrhea or sinus tenderness. No epistaxis. Right sinus exhibits no maxillary sinus tenderness and no frontal sinus tenderness. Left sinus exhibits no maxillary sinus tenderness and no frontal sinus tenderness.     Mouth/Throat  Oropharynx not clear and moist and abnormal uvula midline. Normal dentition. No uvula  swelling. No oropharyngeal exudate.     Neck:  Trachea normal, phonation normal and no adenopathy.     Pulmonary/Chest:   Effort normal.     Psychiatric:   He has a normal mood and affect. His speech is normal and behavior is normal.     Neurological:   He is alert and oriented to person, place, and time. He has neurological normal, alert and oriented.   Procedure    Nasal endoscopy performed.  See procedure note.    Data Reviewed  WBC (K/uL)   Date Value   01/21/2022 10.11     Eosinophil % (%)   Date Value   01/21/2022 0.5     Eos # (K/uL)   Date Value   01/21/2022 0.1     Platelets (K/uL)   Date Value   01/21/2022 336     Glucose (mg/dL)   Date Value   01/21/2022 103     No results found for: IGE    Audiogram    Assessment:     1. ETD (Eustachian tube dysfunction), right    2. Conductive hearing loss of right ear with unrestricted hearing of left ear      Plan:   I had a long discussion with the patient regarding his condition and the further workup and management options.     ETD (Eustachian tube dysfunction), right  Approximately x10 months of right ear fullness with decreased hearing.  Conductive hearing loss with Type C tympanometry on audio.  Unchanged with topical nasal steroids and gentle auto insufflation.  Nasal endoscopy WNL, no masses/lesions obstructing the ET.  He is not interested in a trial of oral steroids.  Recommended evaluation with Otologist.  -     Ambulatory referral/consult to ENT; Future  -     Nasal endoscopy    Conductive hearing loss of right ear with unrestricted hearing of left ear  -     Ambulatory referral/consult to ENT; Future

## 2022-11-01 ENCOUNTER — OFFICE VISIT (OUTPATIENT)
Dept: OTOLARYNGOLOGY | Facility: CLINIC | Age: 37
End: 2022-11-01
Payer: COMMERCIAL

## 2022-11-01 VITALS — WEIGHT: 235 LBS | BODY MASS INDEX: 36.81 KG/M2

## 2022-11-01 DIAGNOSIS — H69.91 ETD (EUSTACHIAN TUBE DYSFUNCTION), RIGHT: ICD-10-CM

## 2022-11-01 DIAGNOSIS — H90.11 CONDUCTIVE HEARING LOSS OF RIGHT EAR WITH UNRESTRICTED HEARING OF LEFT EAR: ICD-10-CM

## 2022-11-01 PROCEDURE — 3008F BODY MASS INDEX DOCD: CPT | Mod: CPTII,S$GLB,, | Performed by: OTOLARYNGOLOGY

## 2022-11-01 PROCEDURE — 3008F PR BODY MASS INDEX (BMI) DOCUMENTED: ICD-10-PCS | Mod: CPTII,S$GLB,, | Performed by: OTOLARYNGOLOGY

## 2022-11-01 PROCEDURE — 1159F MED LIST DOCD IN RCRD: CPT | Mod: CPTII,S$GLB,, | Performed by: OTOLARYNGOLOGY

## 2022-11-01 PROCEDURE — 99999 PR PBB SHADOW E&M-EST. PATIENT-LVL III: ICD-10-PCS | Mod: PBBFAC,,, | Performed by: OTOLARYNGOLOGY

## 2022-11-01 PROCEDURE — 1159F PR MEDICATION LIST DOCUMENTED IN MEDICAL RECORD: ICD-10-PCS | Mod: CPTII,S$GLB,, | Performed by: OTOLARYNGOLOGY

## 2022-11-01 PROCEDURE — 99213 OFFICE O/P EST LOW 20 MIN: CPT | Mod: S$GLB,,, | Performed by: OTOLARYNGOLOGY

## 2022-11-01 PROCEDURE — 99999 PR PBB SHADOW E&M-EST. PATIENT-LVL III: CPT | Mod: PBBFAC,,, | Performed by: OTOLARYNGOLOGY

## 2022-11-01 PROCEDURE — 99213 PR OFFICE/OUTPT VISIT, EST, LEVL III, 20-29 MIN: ICD-10-PCS | Mod: S$GLB,,, | Performed by: OTOLARYNGOLOGY

## 2022-11-02 NOTE — PROGRESS NOTES
Otology/Neurotology History and Physical     CC: R ETD    HPI:  Varinder Salazar is a 37 y.o. male who was referred to me by Laura Carvalho in consultation for ETD.    Patient reports that he developed significant illness from Covid about 10 months previous. He was completely recovered, but continued to have aural fullness on the right. Denies any nasal congestion or issues with the left ear. He was seen by one of our NPs and treated with intranasal steroids. Also had negative nasal endoscopy.     Past Medical History  He has a past medical history of Alcohol abuse, Anxiety, Asthma, COPD (chronic obstructive pulmonary disease), Depression, Hernia, inguinal, History of psychiatric hospitalization, Lateral epicondylitis, Other male erectile dysfunction, Sleep difficulties, and Therapy.    Past Surgical History  He has a past surgical history that includes [place] tooth extraction; Cyst Removal; and Robot-assisted laparoscopic repair of inguinal hernia using da Ann Marie Xi (Left, 6/24/2020).    Family History  His family history includes Anxiety disorder in his mother; Bipolar disorder in his maternal aunt; Dementia in his father; Depression in his mother; Diabetes in his father; Drug abuse in his brother; Heart disease in his father; Migraines in his mother.    Social History  He reports that he has been smoking cigarettes. He has never used smokeless tobacco. He reports that he does not currently use alcohol. He reports that he does not currently use drugs after having used the following drugs: Marijuana.    Allergies  He has No Known Allergies.    Medications  He has a current medication list which includes the following prescription(s): baclofen, bupropion, cephalexin, cetirizine, diclofenac, diclofenac, escitalopram oxalate, fluticasone propionate, multivitamin, nabumetone, varenicline, and varenicline.    Review of Systems   Constitutional:  Negative for chills and fever.   HENT:  Positive for hearing loss. Negative  for congestion, ear discharge, ear pain, facial swelling and sinus pressure.    Eyes:  Negative for photophobia and pain.   Respiratory:  Negative for shortness of breath and stridor.    Cardiovascular:  Negative for chest pain.   Gastrointestinal:  Negative for nausea.   All other systems reviewed and are negative.       Objective:     Wt 106.6 kg (235 lb)   BMI 36.81 kg/m²    Physical Exam  Constitutional: Well appearing/communicating. Voice clear. NAD.  Eyes: EOM I Bilaterally  Head/Face: Normocephalic.  House Brackmann I Bilaterally.  Right Ear: External Auditory Canal WNL,TM w/o masses/lesions/perforations.  Auricle WNL.  Left Ear: External Auditory Canal WNL,TM w/o masses/lesions/perforations. Auricle WNL.  Nose: No gross nasal septal deviation. Inferior Turbinates 2+ bilaterally. No septal perforation. No masses/lesions. External nasal skin without masses/lesions.  Oral Cavity: Gingiva/lips WNL. Oral Tongue mobile. Hard Palate WNL.   Oropharynx: Tonsillar fossa/pharyngeal wall without lesions. Posterior oropharynx WNL.  Soft palate without masses. Midline uvula.   Neck/Lymphatic: No LAD I-VI bilaterally.  No thyromegaly.  No masses noted on exam.  Neuro/Psychiatric: AOx3.  Normal mood and affect.   Respiratory: Normal respiratory effort, no stridor/stertor, no retractions noted.      Procedure    None        Data Reviewed         Assessment:     1. ETD (Eustachian tube dysfunction), right    2. Conductive hearing loss of right ear with unrestricted hearing of left ear         Plan:   Offered observation vs. PE tube placement vs. CT. Patient would prefer to proceed with PE tube placement, but not at today's visit.     We will see him back for PE tube placement at his convenience.

## 2022-11-28 ENCOUNTER — OFFICE VISIT (OUTPATIENT)
Dept: PRIMARY CARE CLINIC | Facility: CLINIC | Age: 37
End: 2022-11-28
Payer: COMMERCIAL

## 2022-11-28 VITALS
TEMPERATURE: 99 F | SYSTOLIC BLOOD PRESSURE: 126 MMHG | WEIGHT: 219.81 LBS | HEIGHT: 67 IN | HEART RATE: 108 BPM | OXYGEN SATURATION: 97 % | BODY MASS INDEX: 34.5 KG/M2 | DIASTOLIC BLOOD PRESSURE: 84 MMHG

## 2022-11-28 DIAGNOSIS — N52.9 ERECTILE DYSFUNCTION, UNSPECIFIED ERECTILE DYSFUNCTION TYPE: ICD-10-CM

## 2022-11-28 DIAGNOSIS — J30.2 SEASONAL ALLERGIES: ICD-10-CM

## 2022-11-28 DIAGNOSIS — M54.50 LOW BACK PAIN, UNSPECIFIED BACK PAIN LATERALITY, UNSPECIFIED CHRONICITY, UNSPECIFIED WHETHER SCIATICA PRESENT: Primary | ICD-10-CM

## 2022-11-28 PROCEDURE — 99213 OFFICE O/P EST LOW 20 MIN: CPT | Mod: S$GLB,,, | Performed by: NURSE PRACTITIONER

## 2022-11-28 PROCEDURE — 3079F PR MOST RECENT DIASTOLIC BLOOD PRESSURE 80-89 MM HG: ICD-10-PCS | Mod: CPTII,S$GLB,, | Performed by: NURSE PRACTITIONER

## 2022-11-28 PROCEDURE — 1159F PR MEDICATION LIST DOCUMENTED IN MEDICAL RECORD: ICD-10-PCS | Mod: CPTII,S$GLB,, | Performed by: NURSE PRACTITIONER

## 2022-11-28 PROCEDURE — 1160F PR REVIEW ALL MEDS BY PRESCRIBER/CLIN PHARMACIST DOCUMENTED: ICD-10-PCS | Mod: CPTII,S$GLB,, | Performed by: NURSE PRACTITIONER

## 2022-11-28 PROCEDURE — 3074F SYST BP LT 130 MM HG: CPT | Mod: CPTII,S$GLB,, | Performed by: NURSE PRACTITIONER

## 2022-11-28 PROCEDURE — 3074F PR MOST RECENT SYSTOLIC BLOOD PRESSURE < 130 MM HG: ICD-10-PCS | Mod: CPTII,S$GLB,, | Performed by: NURSE PRACTITIONER

## 2022-11-28 PROCEDURE — 99213 PR OFFICE/OUTPT VISIT, EST, LEVL III, 20-29 MIN: ICD-10-PCS | Mod: S$GLB,,, | Performed by: NURSE PRACTITIONER

## 2022-11-28 PROCEDURE — 3079F DIAST BP 80-89 MM HG: CPT | Mod: CPTII,S$GLB,, | Performed by: NURSE PRACTITIONER

## 2022-11-28 PROCEDURE — 3008F PR BODY MASS INDEX (BMI) DOCUMENTED: ICD-10-PCS | Mod: CPTII,S$GLB,, | Performed by: NURSE PRACTITIONER

## 2022-11-28 PROCEDURE — 99999 PR PBB SHADOW E&M-EST. PATIENT-LVL V: ICD-10-PCS | Mod: PBBFAC,,, | Performed by: NURSE PRACTITIONER

## 2022-11-28 PROCEDURE — 99999 PR PBB SHADOW E&M-EST. PATIENT-LVL V: CPT | Mod: PBBFAC,,, | Performed by: NURSE PRACTITIONER

## 2022-11-28 PROCEDURE — 1159F MED LIST DOCD IN RCRD: CPT | Mod: CPTII,S$GLB,, | Performed by: NURSE PRACTITIONER

## 2022-11-28 PROCEDURE — 3008F BODY MASS INDEX DOCD: CPT | Mod: CPTII,S$GLB,, | Performed by: NURSE PRACTITIONER

## 2022-11-28 PROCEDURE — 1160F RVW MEDS BY RX/DR IN RCRD: CPT | Mod: CPTII,S$GLB,, | Performed by: NURSE PRACTITIONER

## 2022-11-28 RX ORDER — CETIRIZINE HYDROCHLORIDE 10 MG/1
10 TABLET ORAL DAILY
Qty: 30 TABLET | Refills: 11 | Status: SHIPPED | OUTPATIENT
Start: 2022-11-28 | End: 2023-02-28

## 2022-11-28 RX ORDER — CYCLOBENZAPRINE HCL 10 MG
10 TABLET ORAL 3 TIMES DAILY PRN
Qty: 30 TABLET | Refills: 0 | Status: SHIPPED | OUTPATIENT
Start: 2022-11-28 | End: 2022-12-08

## 2022-11-28 RX ORDER — SILDENAFIL 50 MG/1
50 TABLET, FILM COATED ORAL DAILY PRN
Qty: 30 TABLET | Refills: 1 | Status: SHIPPED | OUTPATIENT
Start: 2022-11-28 | End: 2023-09-28
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
## 2022-11-29 ENCOUNTER — TELEPHONE (OUTPATIENT)
Dept: PRIMARY CARE CLINIC | Facility: CLINIC | Age: 37
End: 2022-11-29
Payer: COMMERCIAL

## 2022-11-29 NOTE — TELEPHONE ENCOUNTER
----- Message from Keyanna Hernandez sent at 11/29/2022  4:07 PM CST -----  Pt states his prescription sildenafil is not covered by his insurance per his pharmacy the cost is $1,000 pt is wanting TRACY Stanley to contact him back about this matter.    Thanks

## 2022-11-30 RX ORDER — TADALAFIL 10 MG/1
10 TABLET ORAL DAILY PRN
Qty: 20 TABLET | Refills: 0 | Status: SHIPPED | OUTPATIENT
Start: 2022-11-30 | End: 2023-02-28

## 2022-12-02 ENCOUNTER — HOSPITAL ENCOUNTER (OUTPATIENT)
Dept: RADIOLOGY | Facility: HOSPITAL | Age: 37
Discharge: HOME | End: 2022-12-02
Attending: NURSE PRACTITIONER
Payer: COMMERCIAL

## 2022-12-02 DIAGNOSIS — M54.50 LOW BACK PAIN, UNSPECIFIED BACK PAIN LATERALITY, UNSPECIFIED CHRONICITY, UNSPECIFIED WHETHER SCIATICA PRESENT: ICD-10-CM

## 2022-12-02 PROCEDURE — 72100 X-RAY EXAM L-S SPINE 2/3 VWS: CPT | Mod: TC,PN

## 2022-12-02 PROCEDURE — 72100 X-RAY EXAM L-S SPINE 2/3 VWS: CPT | Mod: 26,,, | Performed by: RADIOLOGY

## 2022-12-02 PROCEDURE — 72100 XR LUMBAR SPINE AP AND LATERAL: ICD-10-PCS | Mod: 26,,, | Performed by: RADIOLOGY

## 2022-12-23 ENCOUNTER — OFFICE VISIT (OUTPATIENT)
Dept: OTOLARYNGOLOGY | Facility: CLINIC | Age: 37
End: 2022-12-23
Payer: COMMERCIAL

## 2022-12-23 ENCOUNTER — CLINICAL SUPPORT (OUTPATIENT)
Dept: AUDIOLOGY | Facility: CLINIC | Age: 37
End: 2022-12-23
Payer: COMMERCIAL

## 2022-12-23 VITALS — BODY MASS INDEX: 34.3 KG/M2 | WEIGHT: 219 LBS

## 2022-12-23 DIAGNOSIS — H90.11 CONDUCTIVE HEARING LOSS OF RIGHT EAR WITH UNRESTRICTED HEARING OF LEFT EAR: ICD-10-CM

## 2022-12-23 DIAGNOSIS — H90.11 CONDUCTIVE HEARING LOSS OF RIGHT EAR WITH UNRESTRICTED HEARING OF LEFT EAR: Primary | ICD-10-CM

## 2022-12-23 DIAGNOSIS — H69.91 ETD (EUSTACHIAN TUBE DYSFUNCTION), RIGHT: Primary | ICD-10-CM

## 2022-12-23 DIAGNOSIS — H93.11 TINNITUS, RIGHT EAR: ICD-10-CM

## 2022-12-23 PROCEDURE — 92567 TYMPANOMETRY: CPT | Mod: PBBFAC

## 2022-12-23 PROCEDURE — 69433 CREATE EARDRUM OPENING: CPT | Mod: S$GLB,,, | Performed by: OTOLARYNGOLOGY

## 2022-12-23 PROCEDURE — 3008F BODY MASS INDEX DOCD: CPT | Mod: CPTII,S$GLB,, | Performed by: OTOLARYNGOLOGY

## 2022-12-23 PROCEDURE — 99212 OFFICE O/P EST SF 10 MIN: CPT | Mod: 25,S$GLB,, | Performed by: OTOLARYNGOLOGY

## 2022-12-23 PROCEDURE — 99211 OFF/OP EST MAY X REQ PHY/QHP: CPT | Mod: PBBFAC

## 2022-12-23 PROCEDURE — 99999 PR PBB SHADOW E&M-EST. PATIENT-LVL II: ICD-10-PCS | Mod: PBBFAC,,, | Performed by: OTOLARYNGOLOGY

## 2022-12-23 PROCEDURE — 92557 COMPREHENSIVE HEARING TEST: CPT | Mod: PBBFAC

## 2022-12-23 PROCEDURE — 99999 PR PBB SHADOW E&M-EST. PATIENT-LVL I: CPT | Mod: PBBFAC,,,

## 2022-12-23 PROCEDURE — 99212 PR OFFICE/OUTPT VISIT, EST, LEVL II, 10-19 MIN: ICD-10-PCS | Mod: 25,S$GLB,, | Performed by: OTOLARYNGOLOGY

## 2022-12-23 PROCEDURE — 1159F PR MEDICATION LIST DOCUMENTED IN MEDICAL RECORD: ICD-10-PCS | Mod: CPTII,S$GLB,, | Performed by: OTOLARYNGOLOGY

## 2022-12-23 PROCEDURE — 99999 PR PBB SHADOW E&M-EST. PATIENT-LVL I: ICD-10-PCS | Mod: PBBFAC,,,

## 2022-12-23 PROCEDURE — 69433 PR CREATE EARDRUM OPENING,LOCAL ANESTH: ICD-10-PCS | Mod: S$GLB,,, | Performed by: OTOLARYNGOLOGY

## 2022-12-23 PROCEDURE — 99999 PR PBB SHADOW E&M-EST. PATIENT-LVL II: CPT | Mod: PBBFAC,,, | Performed by: OTOLARYNGOLOGY

## 2022-12-23 PROCEDURE — 1159F MED LIST DOCD IN RCRD: CPT | Mod: CPTII,S$GLB,, | Performed by: OTOLARYNGOLOGY

## 2022-12-23 PROCEDURE — 3008F PR BODY MASS INDEX (BMI) DOCUMENTED: ICD-10-PCS | Mod: CPTII,S$GLB,, | Performed by: OTOLARYNGOLOGY

## 2022-12-23 RX ORDER — OFLOXACIN 3 MG/ML
5 SOLUTION AURICULAR (OTIC) 2 TIMES DAILY
Qty: 10 ML | Refills: 0 | Status: SHIPPED | OUTPATIENT
Start: 2022-12-23 | End: 2023-02-28

## 2022-12-23 NOTE — PROGRESS NOTES
Varinder Salazar, a 37 y.o. male, was seen today in the clinic for an audiologic evaluation.  The patient's main complaint was hearing loss.  Mr. Salazar reported intermittent non-bothersome tinnitus in his right ear. He also reported right ear aural fullness. Mr. Salazar denied vertigo.     Tympanometry revealed Type C in the right ear and Type A in the left ear. Audiogram results revealed a mild conductive hearing loss in the right ear and A in the left ear.  Speech reception thresholds were noted at 30 dB in the right ear and 5 dB in the left ear.  Speech discrimination scores were 100% in the right ear and 100% in the left ear.    Recommendations:  Otologic evaluation  Repeat audiogram at ENT follow-up  Hearing protection when in noise

## 2022-12-23 NOTE — PROGRESS NOTES
Otology/Neurotology History and Physical     CC: R ETD    HPI:  Varinder Salazar is a 37 y.o. male who was referred to me by No ref. provider found in consultation for ETD.    Patient reports that he developed significant illness from Covid about 10 months previous. He was completely recovered, but continued to have aural fullness on the right. Denies any nasal congestion or issues with the left ear. He was seen by one of our NPs and treated with intranasal steroids. Also had negative nasal endoscopy.     12/23/22:  Presents today for follow-up continues to have right-sided ear fullness.  Would like to have PE tube placed today for diagnostic trial.  Denies any otorrhea.  Denies any ear pain.    Past Medical History  He has a past medical history of Alcohol abuse, Anxiety, Asthma, COPD (chronic obstructive pulmonary disease), Depression, Hernia, inguinal, History of psychiatric hospitalization, Lateral epicondylitis, Other male erectile dysfunction, Sleep difficulties, and Therapy.    Past Surgical History  He has a past surgical history that includes [place] tooth extraction; Cyst Removal; and Robot-assisted laparoscopic repair of inguinal hernia using da Ann Marie Xi (Left, 6/24/2020).    Family History  His family history includes Anxiety disorder in his mother; Bipolar disorder in his maternal aunt; Dementia in his father; Depression in his mother; Diabetes in his father, maternal grandfather, maternal grandmother, paternal grandfather, and paternal grandmother; Drug abuse in his brother; Heart disease in his father; Hypertension in his maternal grandfather, maternal grandmother, paternal grandfather, and paternal grandmother; Migraines in his mother; Psoriasis in his sister.    Social History  He reports that he has been smoking cigarettes. He has never used smokeless tobacco. He reports that he does not currently use alcohol. He reports that he does not currently use drugs after having used the following drugs:  Marijuana.    Allergies  He has No Known Allergies.    Medications  He has a current medication list which includes the following prescription(s): multivitamin, cetirizine, ofloxacin, sildenafil, and tadalafil.    Review of Systems   Constitutional:  Negative for chills and fever.   HENT:  Positive for hearing loss. Negative for congestion, ear discharge, ear pain, facial swelling and sinus pressure.    Eyes:  Negative for photophobia and pain.   Respiratory:  Negative for shortness of breath and stridor.    Cardiovascular:  Negative for chest pain.   Gastrointestinal:  Negative for nausea.   All other systems reviewed and are negative.       Objective:     Wt 99.3 kg (219 lb)   BMI 34.30 kg/m²    Physical Exam  Constitutional: Well appearing/communicating. Voice clear. NAD.  Eyes: EOM I Bilaterally  Head/Face: Normocephalic.  House Brackmann I Bilaterally.  Right Ear: External Auditory Canal WNL,TM w/o masses/lesions/perforations.  Auricle WNL.  Left Ear: External Auditory Canal WNL,TM w/o masses/lesions/perforations. Auricle WNL.  Nose: No gross nasal septal deviation. Inferior Turbinates 2+ bilaterally. No septal perforation. No masses/lesions. External nasal skin without masses/lesions.  Oral Cavity: Gingiva/lips WNL. Oral Tongue mobile. Hard Palate WNL.   Oropharynx: Tonsillar fossa/pharyngeal wall without lesions. Posterior oropharynx WNL.  Soft palate without masses. Midline uvula.   Neck/Lymphatic: No LAD I-VI bilaterally.  No thyromegaly.  No masses noted on exam.  Neuro/Psychiatric: AOx3.  Normal mood and affect.   Respiratory: Normal respiratory effort, no stridor/stertor, no retractions noted.      Procedure    None        Data Reviewed           Audiogram tracings independently reviewed and discussed with patient shows continued right-sided conductive hearing loss with type C tympanogram    Procedure:  Right myringotomy with PE tube placement   Details:  Right eustachian tube dysfunction, type C  tympanogram chronically   Details:After informed consent regarding infection, perforation, early extrusion and possible cholesteatoma formation the patient was brought to the minor procedure room and placed in the supine position. The operating microscope was then brought onto the field and the tympanic membrane was visualized. Using a sterile, single use phenol kit the TM was sterilized and anesthetized. A myringotomy incision was made, the middle ear was dry. A sterile Lee V tympanostomy tube was placed and the procedure was terminated. The patient tolerated the procedure well.      Water precautions discussed. RTC as directed.      Assessment:     1. ETD (Eustachian tube dysfunction), right    2. Conductive hearing loss of right ear with unrestricted hearing of left ear         Plan:   Offered observation vs. PE tube placement vs. CT. Patient would prefer to proceed with PE tube placement,     Performed today, no effusion noted  Follow-up in 1 month with audiogram, will get CT temporal bone if hearing loss not resolved

## 2023-01-18 ENCOUNTER — HOSPITAL ENCOUNTER (EMERGENCY)
Facility: OTHER | Age: 38
Discharge: HOME | End: 2023-01-19
Attending: EMERGENCY MEDICINE

## 2023-01-18 DIAGNOSIS — H57.89 RED EYE: ICD-10-CM

## 2023-01-18 DIAGNOSIS — H10.9 CONJUNCTIVITIS, UNSPECIFIED CONJUNCTIVITIS TYPE, UNSPECIFIED LATERALITY: Primary | ICD-10-CM

## 2023-01-18 PROCEDURE — 99283 EMERGENCY DEPT VISIT LOW MDM: CPT

## 2023-01-18 RX ORDER — ERYTHROMYCIN 5 MG/G
OINTMENT OPHTHALMIC
Status: COMPLETED | OUTPATIENT
Start: 2023-01-19 | End: 2023-01-19

## 2023-01-18 RX ORDER — ERYTHROMYCIN 5 MG/G
OINTMENT OPHTHALMIC
Qty: 3 G | Refills: 0 | Status: SHIPPED | OUTPATIENT
Start: 2023-01-18 | End: 2023-02-28

## 2023-01-18 NOTE — Clinical Note
"Varinder"Waqas Salazar was seen and treated in our emergency department on 1/18/2023.  He may return to work on 01/20/2023.       If you have any questions or concerns, please don't hesitate to call.      Bob Chu MD"

## 2023-01-19 VITALS
SYSTOLIC BLOOD PRESSURE: 128 MMHG | WEIGHT: 215 LBS | HEIGHT: 67 IN | BODY MASS INDEX: 33.74 KG/M2 | RESPIRATION RATE: 17 BRPM | HEART RATE: 100 BPM | OXYGEN SATURATION: 99 % | DIASTOLIC BLOOD PRESSURE: 81 MMHG | TEMPERATURE: 98 F

## 2023-01-19 PROCEDURE — 25000003 PHARM REV CODE 250: Performed by: EMERGENCY MEDICINE

## 2023-01-19 RX ADMIN — ERYTHROMYCIN 1 INCH: 5 OINTMENT OPHTHALMIC at 12:01

## 2023-01-19 NOTE — ED PROVIDER NOTES
Encounter Date: 1/18/2023       History     Chief Complaint   Patient presents with    Conjunctivitis     Left eye pain pt noticed around 3 pm. Only slight itching. Redness noted, no discharge.     36 yo with redness in the left eye which started today. No injuries, no foreign body sensation, visual acuity intact. Had this once before. No glasses or contacts worn, was worried about having to go to work with a red eye and being told to go home.     Review of patient's allergies indicates:  No Known Allergies  Past Medical History:   Diagnosis Date    Alcohol abuse     ended 2-4 years ago    Anxiety     Asthma     COPD (chronic obstructive pulmonary disease)     Childhood asthma.    Depression     Hernia, inguinal     left    History of psychiatric hospitalization     Lateral epicondylitis 8/23/2013    Other male erectile dysfunction 7/24/2019    Sleep difficulties     Therapy      Past Surgical History:   Procedure Laterality Date    CYST REMOVAL      ROBOT-ASSISTED LAPAROSCOPIC REPAIR OF INGUINAL HERNIA USING DA MARCOS XI Left 6/24/2020    Procedure: XI ROBOTIC REPAIR, HERNIA, INGUINAL Left;  Surgeon: Vinayak Guaman MD;  Location: [facility] OR [address] [place] [address];  Service: General;  Laterality: Left;    WISDOM TOOTH EXTRACTION       Family History   Problem Relation Age of Onset    Migraines Mother     Anxiety disorder Mother     Depression Mother     Diabetes Father     Heart disease Father     Dementia Father     Psoriasis Sister     Drug abuse Brother     Hypertension Maternal Grandmother     Diabetes Maternal Grandmother     Hypertension Maternal Grandfather     Diabetes Maternal Grandfather     Hypertension Paternal Grandmother     Diabetes Paternal Grandmother     Hypertension Paternal Grandfather     Diabetes Paternal Grandfather     Bipolar disorder Maternal Aunt     Schizophrenia Neg Hx     Suicide Neg Hx     Alcohol abuse Neg Hx      Social History     Tobacco Use    Smoking status: Every Day     Types: Cigarettes     Regarding: sore throat, cough, congestion  ----- Message from Rayna Rollins sent at 1/6/2018  7:13 AM CST -----  Patient Name: Clyde Faust  Specialist or PCP:   Pregnant (If Yes, how long?):no   Symptoms:sore throat, dry cough, congestion, chills, over the counter meds not working   Call Back #:[phone]  Is the patient’s permanent residence located in [place], [place], or a [facility]? Yes [place] [address]  Call Center Account #:[id number]      Smokeless tobacco: Never   Substance Use Topics    Alcohol use: Not Currently    Drug use: Not Currently     Types: Marijuana     Review of Systems  Constitutional-no fever  HEENT-no congestion +eye redness  Eyes-no redness  Respiratory-no shortness of breath  Cardio-no chest pain  GI-no abdominal pain  Endocrine-no cold intolerance  -no difficulty urinating  MSK-no myalgias  Skin-no rashes  Allergy-no environmental allergy  Neurologic-, no headache  Hematology-no swollen nodes  Behavioral-no confusion   Physical Exam     Initial Vitals [01/18/23 2254]   BP Pulse Resp Temp SpO2   (!) 146/80 100 18 98 °F (36.7 °C) 96 %      MAP       --         Physical Exam  Constitutional: Well appearing, no distress.  Eyes: EOMI, pupils 3 mm briskly reactive, left eye demonstrates moderate injection  ENT       Head: Normocephalic, atraumatic.       Nose: Normal external appearance        Mouth/Throat: no strigulous respirations   Hematological/Lymphatic/Immunilogical: no visible lymphadenopathy   Cardiovascular: Normal rate,   Respiratory: Normal respiratory effort.   Gastrointestinal: non distended   Musculoskeletal: Normal range of motion in all extremities. No obvious deformities or swelling.  Neurologic: Alert, oriented. Normal speech and language. No gross focal neurologic deficits are appreciated.  Skin: Skin is warm, dry. No rash noted.  Psychiatric: Mood and affect are normal.    ED Course   Procedures  Labs Reviewed - No data to display       Imaging Results    None          Medications   erythromycin 5 mg/gram (0.5 %) ophthalmic ointment (1 inch Left Eye Given 1/19/23 0006)     Medical Decision Making:   History:   Old Medical Records: I decided to obtain old medical records.  Old Records Summarized: records from clinic visits.  Differential Diagnosis:   Conjunctivitis, glaucoma, foreign body                        Clinical Impression:   Final diagnoses:  [H10.9] Conjunctivitis, unspecified conjunctivitis type,  unspecified laterality (Primary)  [H57.89] Red eye        ED Disposition Condition    Discharge Stable          ED Prescriptions       Medication Sig Dispense Start Date End Date Auth. Provider    erythromycin (ROMYCIN) ophthalmic ointment Place a 1/2 inch ribbon of ointment into the lower eyelid. 3 g 1/18/2023 -- Bob Chu MD          Follow-up Information       Follow up With Specialties Details Why Contact Info    Jeremi Ruiz MD Family Medicine Call  As needed [address] [facility] [address]  [phone]               Bob Chu MD  01/19/23 8810

## 2023-01-19 NOTE — DISCHARGE INSTRUCTIONS
Mr. Salazar,    Thank you for letting me care for you today! It was nice meeting you, and I hope you feel better soon.   If you would like access to your chart and what was done today please utilize the [facility].   Please come back to [facility] for all of your future medical needs.    Our goal in the emergency department is to always give you outstanding care and exceptional service. You may receive a survey by mail or e-mail in the next week regarding your experience in our ED. We would greatly appreciate you completing and returning the survey. Your feedback provides us with a way to recognize our staff who give very good care and it helps us learn how to improve when your experience was below our aspiration of excellence.     Sincerely,    Bob Chu MD  Board Certified Emergency Physician

## 2023-02-28 ENCOUNTER — LAB VISIT (OUTPATIENT)
Dept: LAB | Facility: HOSPITAL | Age: 38
End: 2023-02-28
Attending: FAMILY MEDICINE
Payer: COMMERCIAL

## 2023-02-28 ENCOUNTER — OFFICE VISIT (OUTPATIENT)
Dept: PRIMARY CARE CLINIC | Facility: CLINIC | Age: 38
End: 2023-02-28
Payer: COMMERCIAL

## 2023-02-28 VITALS
TEMPERATURE: 99 F | WEIGHT: 223.75 LBS | BODY MASS INDEX: 35.12 KG/M2 | DIASTOLIC BLOOD PRESSURE: 86 MMHG | HEART RATE: 80 BPM | OXYGEN SATURATION: 96 % | HEIGHT: 67 IN | SYSTOLIC BLOOD PRESSURE: 124 MMHG

## 2023-02-28 DIAGNOSIS — Z00.00 GENERAL MEDICAL EXAM: ICD-10-CM

## 2023-02-28 DIAGNOSIS — E66.01 SEVERE OBESITY (BMI 35.0-39.9) WITH COMORBIDITY: ICD-10-CM

## 2023-02-28 DIAGNOSIS — Z00.00 GENERAL MEDICAL EXAM: Primary | ICD-10-CM

## 2023-02-28 DIAGNOSIS — F41.1 GENERALIZED ANXIETY DISORDER: ICD-10-CM

## 2023-02-28 DIAGNOSIS — F32.A DEPRESSIVE DISORDER: ICD-10-CM

## 2023-02-28 DIAGNOSIS — Z30.09 ENCOUNTER FOR VASECTOMY COUNSELING: ICD-10-CM

## 2023-02-28 DIAGNOSIS — F33.41 RECURRENT MAJOR DEPRESSIVE DISORDER, IN PARTIAL REMISSION: ICD-10-CM

## 2023-02-28 LAB
ERYTHROCYTE [DISTWIDTH] IN BLOOD BY AUTOMATED COUNT: 13.5 % (ref 11.5–14.5)
ESTIMATED AVG GLUCOSE: 111 MG/DL (ref 68–131)
HBA1C MFR BLD: 5.5 % (ref 4–5.6)
HCT VFR BLD AUTO: 46.1 % (ref 40–54)
HGB BLD-MCNC: 14.9 G/DL (ref 14–18)
MCH RBC QN AUTO: 29 PG (ref 27–31)
MCHC RBC AUTO-ENTMCNC: 32.3 G/DL (ref 32–36)
MCV RBC AUTO: 90 FL (ref 82–98)
PLATELET # BLD AUTO: 334 K/UL (ref 150–450)
PMV BLD AUTO: 10.4 FL (ref 9.2–12.9)
RBC # BLD AUTO: 5.14 M/UL (ref 4.6–6.2)
WBC # BLD AUTO: 10.9 K/UL (ref 3.9–12.7)

## 2023-02-28 PROCEDURE — 84443 ASSAY THYROID STIM HORMONE: CPT | Performed by: FAMILY MEDICINE

## 2023-02-28 PROCEDURE — 3008F BODY MASS INDEX DOCD: CPT | Mod: CPTII,S$GLB,, | Performed by: FAMILY MEDICINE

## 2023-02-28 PROCEDURE — 36415 COLL VENOUS BLD VENIPUNCTURE: CPT | Mod: PN | Performed by: FAMILY MEDICINE

## 2023-02-28 PROCEDURE — 80061 LIPID PANEL: CPT | Performed by: FAMILY MEDICINE

## 2023-02-28 PROCEDURE — 1160F PR REVIEW ALL MEDS BY PRESCRIBER/CLIN PHARMACIST DOCUMENTED: ICD-10-PCS | Mod: CPTII,S$GLB,, | Performed by: FAMILY MEDICINE

## 2023-02-28 PROCEDURE — 99395 PR PREVENTIVE VISIT,EST,18-39: ICD-10-PCS | Mod: S$GLB,,, | Performed by: FAMILY MEDICINE

## 2023-02-28 PROCEDURE — 85027 COMPLETE CBC AUTOMATED: CPT | Performed by: FAMILY MEDICINE

## 2023-02-28 PROCEDURE — 3044F HG A1C LEVEL LT 7.0%: CPT | Mod: CPTII,S$GLB,, | Performed by: FAMILY MEDICINE

## 2023-02-28 PROCEDURE — 1159F PR MEDICATION LIST DOCUMENTED IN MEDICAL RECORD: ICD-10-PCS | Mod: CPTII,S$GLB,, | Performed by: FAMILY MEDICINE

## 2023-02-28 PROCEDURE — 3074F PR MOST RECENT SYSTOLIC BLOOD PRESSURE < 130 MM HG: ICD-10-PCS | Mod: CPTII,S$GLB,, | Performed by: FAMILY MEDICINE

## 2023-02-28 PROCEDURE — 80053 COMPREHEN METABOLIC PANEL: CPT | Performed by: FAMILY MEDICINE

## 2023-02-28 PROCEDURE — 3044F PR MOST RECENT HEMOGLOBIN A1C LEVEL <7.0%: ICD-10-PCS | Mod: CPTII,S$GLB,, | Performed by: FAMILY MEDICINE

## 2023-02-28 PROCEDURE — 99395 PREV VISIT EST AGE 18-39: CPT | Mod: S$GLB,,, | Performed by: FAMILY MEDICINE

## 2023-02-28 PROCEDURE — 1160F RVW MEDS BY RX/DR IN RCRD: CPT | Mod: CPTII,S$GLB,, | Performed by: FAMILY MEDICINE

## 2023-02-28 PROCEDURE — 3074F SYST BP LT 130 MM HG: CPT | Mod: CPTII,S$GLB,, | Performed by: FAMILY MEDICINE

## 2023-02-28 PROCEDURE — 3008F PR BODY MASS INDEX (BMI) DOCUMENTED: ICD-10-PCS | Mod: CPTII,S$GLB,, | Performed by: FAMILY MEDICINE

## 2023-02-28 PROCEDURE — 1159F MED LIST DOCD IN RCRD: CPT | Mod: CPTII,S$GLB,, | Performed by: FAMILY MEDICINE

## 2023-02-28 PROCEDURE — 3079F PR MOST RECENT DIASTOLIC BLOOD PRESSURE 80-89 MM HG: ICD-10-PCS | Mod: CPTII,S$GLB,, | Performed by: FAMILY MEDICINE

## 2023-02-28 PROCEDURE — 83036 HEMOGLOBIN GLYCOSYLATED A1C: CPT | Performed by: FAMILY MEDICINE

## 2023-02-28 PROCEDURE — 99999 PR PBB SHADOW E&M-EST. PATIENT-LVL IV: CPT | Mod: PBBFAC,,, | Performed by: FAMILY MEDICINE

## 2023-02-28 PROCEDURE — 3079F DIAST BP 80-89 MM HG: CPT | Mod: CPTII,S$GLB,, | Performed by: FAMILY MEDICINE

## 2023-02-28 PROCEDURE — 99999 PR PBB SHADOW E&M-EST. PATIENT-LVL IV: ICD-10-PCS | Mod: PBBFAC,,, | Performed by: FAMILY MEDICINE

## 2023-02-28 RX ORDER — VARENICLINE TARTRATE 0.5 (11)-1
KIT ORAL
Qty: 1 EACH | Refills: 0 | Status: SHIPPED | OUTPATIENT
Start: 2023-02-28 | End: 2023-04-18 | Stop reason: SDUPTHER

## 2023-03-01 LAB
ALBUMIN SERPL BCP-MCNC: 4.4 G/DL (ref 3.5–5.2)
ALP SERPL-CCNC: 61 U/L (ref 55–135)
ALT SERPL W/O P-5'-P-CCNC: 42 U/L (ref 10–44)
ANION GAP SERPL CALC-SCNC: 10 MMOL/L (ref 8–16)
AST SERPL-CCNC: 24 U/L (ref 10–40)
BILIRUB SERPL-MCNC: 0.5 MG/DL (ref 0.1–1)
BUN SERPL-MCNC: 10 MG/DL (ref 6–20)
CALCIUM SERPL-MCNC: 9.7 MG/DL (ref 8.7–10.5)
CHLORIDE SERPL-SCNC: 105 MMOL/L (ref 95–110)
CHOLEST SERPL-MCNC: 187 MG/DL (ref 120–199)
CHOLEST/HDLC SERPL: 3.3 {RATIO} (ref 2–5)
CO2 SERPL-SCNC: 26 MMOL/L (ref 23–29)
CREAT SERPL-MCNC: 1.1 MG/DL (ref 0.5–1.4)
EST. GFR  (NO RACE VARIABLE): >60 ML/MIN/1.73 M^2
GLUCOSE SERPL-MCNC: 81 MG/DL (ref 70–110)
HDLC SERPL-MCNC: 56 MG/DL (ref 40–75)
HDLC SERPL: 29.9 % (ref 20–50)
LDLC SERPL CALC-MCNC: 119.4 MG/DL (ref 63–159)
NONHDLC SERPL-MCNC: 131 MG/DL
POTASSIUM SERPL-SCNC: 4 MMOL/L (ref 3.5–5.1)
PROT SERPL-MCNC: 7.7 G/DL (ref 6–8.4)
SODIUM SERPL-SCNC: 141 MMOL/L (ref 136–145)
TRIGL SERPL-MCNC: 58 MG/DL (ref 30–150)
TSH SERPL DL<=0.005 MIU/L-ACNC: 1.58 UIU/ML (ref 0.4–4)

## 2023-03-02 PROBLEM — H60.501 ACUTE OTITIS EXTERNA OF RIGHT EAR: Status: RESOLVED | Noted: 2020-10-20 | Resolved: 2023-03-02

## 2023-03-02 PROBLEM — E66.812 CLASS 2 OBESITY DUE TO EXCESS CALORIES WITHOUT SERIOUS COMORBIDITY WITH BODY MASS INDEX (BMI) OF 35.0 TO 35.9 IN ADULT: Status: ACTIVE | Noted: 2023-03-02

## 2023-03-02 PROBLEM — R52 PAIN: Status: RESOLVED | Noted: 2021-02-02 | Resolved: 2023-03-02

## 2023-03-02 PROBLEM — F33.41 RECURRENT MAJOR DEPRESSIVE DISORDER, IN PARTIAL REMISSION: Status: ACTIVE | Noted: 2023-03-02

## 2023-03-02 PROBLEM — F32.A DEPRESSIVE DISORDER: Status: ACTIVE | Noted: 2023-03-02

## 2023-03-02 PROBLEM — E66.09 CLASS 2 OBESITY DUE TO EXCESS CALORIES WITHOUT SERIOUS COMORBIDITY WITH BODY MASS INDEX (BMI) OF 35.0 TO 35.9 IN ADULT: Status: ACTIVE | Noted: 2023-03-02

## 2023-03-02 NOTE — PROGRESS NOTES
"    /86 (BP Location: Right arm, Patient Position: Sitting, BP Method: Large (Manual))   Pulse 80   Temp 98.7 °F (37.1 °C)   Ht 5' 7" (1.702 m)   Wt 101.5 kg (223 lb 12.3 oz)   SpO2 96%   BMI 35.05 kg/m²       ===========    Chief Complaint: No chief complaint on file.        Varinder Salazar is a 37 y.o. male here for    HPI    Annual Exam.  Health maintenance reviewed with pt in detail inc screening studies such as lab studies and vaccines    Patient queried and denies any further complaints    SURGICAL AND MEDICAL HISTORY: updated and reviewed.  ALLERGIES updated and reviewed.  Review of patient's allergies indicates:  No Known Allergies  CURRENT OUTPATIENT MEDICATIONS updated and reviewed    Current Outpatient Medications:     multivitamin capsule, Take 1 capsule by mouth once daily., Disp: , Rfl:     sildenafiL (VIAGRA) 50 MG tablet, Take 1 tablet (50 mg total) by mouth daily as needed for Erectile Dysfunction. (Patient not taking: Reported on 12/23/2022), Disp: 30 tablet, Rfl: 1    varenicline (CHANTIX STARTING MONTH BOX) 0.5 mg (11)- 1 mg (42) tablet, Take one 0.5mg tab by mouth once daily X3 days,then increase to one 0.5mg tab twice daily X4 days,then increase to one 1mg tab twice daily, Disp: 1 each, Rfl: 0    Review of Systems   Constitutional:  Negative for activity change, appetite change, chills, diaphoresis, fatigue, fever and unexpected weight change.   HENT:  Negative for congestion, ear discharge, ear pain, facial swelling, hearing loss, nosebleeds, postnasal drip, rhinorrhea, sinus pressure, sneezing, sore throat, tinnitus, trouble swallowing and voice change.    Eyes:  Negative for photophobia, pain, discharge, redness, itching and visual disturbance.   Respiratory:  Negative for cough, chest tightness, shortness of breath and wheezing.    Cardiovascular:  Negative for chest pain, palpitations and leg swelling.   Gastrointestinal:  Negative for abdominal distention, abdominal pain, " "anal bleeding, blood in stool, constipation, diarrhea, nausea, rectal pain and vomiting.   Endocrine: Negative for cold intolerance, heat intolerance, polydipsia, polyphagia and polyuria.   Genitourinary:  Negative for difficulty urinating, dysuria and flank pain.   Musculoskeletal:  Negative for arthralgias, back pain, joint swelling, myalgias and neck pain.   Skin:  Negative for rash.   Neurological:  Negative for dizziness, tremors, seizures, syncope, speech difficulty, weakness, light-headedness, numbness and headaches.   Psychiatric/Behavioral:  Negative for behavioral problems, confusion, decreased concentration, dysphoric mood, sleep disturbance and suicidal ideas. The patient is not nervous/anxious and is not hyperactive.      /86 (BP Location: Right arm, Patient Position: Sitting, BP Method: Large (Manual))   Pulse 80   Temp 98.7 °F (37.1 °C)   Ht 5' 7" (1.702 m)   Wt 101.5 kg (223 lb 12.3 oz)   SpO2 96%   BMI 35.05 kg/m²   Physical Exam  Vitals and nursing note reviewed.   Constitutional:       General: He is not in acute distress.     Appearance: Normal appearance. He is well-developed. He is not ill-appearing or toxic-appearing.   HENT:      Head: Normocephalic and atraumatic.      Right Ear: Tympanic membrane, ear canal and external ear normal.      Left Ear: Tympanic membrane, ear canal and external ear normal.      Nose: Nose normal.      Mouth/Throat:      Lips: Pink.      Mouth: Mucous membranes are moist.      Pharynx: No oropharyngeal exudate or posterior oropharyngeal erythema.   Eyes:      General: No scleral icterus.        Right eye: No discharge.         Left eye: No discharge.      Extraocular Movements: Extraocular movements intact.      Conjunctiva/sclera: Conjunctivae normal.   Cardiovascular:      Rate and Rhythm: Normal rate and regular rhythm.      Pulses: Normal pulses.      Heart sounds: Normal heart sounds. No murmur heard.  Pulmonary:      Effort: Pulmonary effort is " normal. No respiratory distress.      Breath sounds: Normal breath sounds. No wheezing or rales.   Abdominal:      General: Bowel sounds are normal. There is no distension.      Palpations: Abdomen is soft. There is no mass.      Tenderness: There is no abdominal tenderness. There is no right CVA tenderness, left CVA tenderness, guarding or rebound.      Hernia: No hernia is present.   Musculoskeletal:      Cervical back: Normal range of motion and neck supple. No rigidity or tenderness.   Lymphadenopathy:      Cervical: No cervical adenopathy.   Skin:     General: Skin is warm and dry.   Neurological:      General: No focal deficit present.      Mental Status: He is alert. Mental status is at baseline.   Psychiatric:         Mood and Affect: Mood normal.         Behavior: Behavior normal. Behavior is cooperative.       Diagnoses and all orders for this visit:    General medical exam  -     CBC Without Differential; Future  -     Comprehensive Metabolic Panel; Future  -     Lipid Panel; Future  -     TSH; Future  -     Hemoglobin A1C; Future    Encounter for vasectomy counseling  -     Ambulatory referral/consult to Urology; Future    Depressive disorder    Recurrent major depressive disorder, in partial remission    Generalized anxiety disorder    Severe obesity (BMI 35.0-39.9) with comorbidity    Other orders  -     varenicline (CHANTIX STARTING MONTH BOX) 0.5 mg (11)- 1 mg (42) tablet; Take one 0.5mg tab by mouth once daily X3 days,then increase to one 0.5mg tab twice daily X4 days,then increase to one 1mg tab twice daily          All lab results over past 2 years available reviewed inc labs and any cardiology or radiology studies  Any new prescription medications gone over in detail including reason for taking the medication, the general mechanism of action, most common possible side effects and possible costs, etcetera.  Jeremi Ruiz MD

## 2023-04-18 NOTE — TELEPHONE ENCOUNTER
----- Message from Loren Mckeon sent at 4/18/2023 12:00 PM CDT -----  Contact: Self/[phone]  Requesting an RX refill or new RX.  Is this a refill or new RX: New  RX name and strength :  Chantix   Is this a 30 day or 90 day RX: 30  Pharmacy name and phone # :    [id number] - [place], [place] - [address] AT [facility] [address]  [facility] [address]  [facility] [address]  Phone: [phone] Fax: [phone]        The doctors have asked that we provide their patients with the following 2 reminders -- prescription refills can take up to 72 hours, and a friendly reminder that in the future you can use your [facility] account to request refills: pt stated that he does not need the starter pack

## 2023-04-20 ENCOUNTER — PATIENT MESSAGE (OUTPATIENT)
Dept: PRIMARY CARE CLINIC | Facility: CLINIC | Age: 38
End: 2023-04-20
Payer: COMMERCIAL

## 2023-04-21 RX ORDER — VARENICLINE TARTRATE 0.5 (11)-1
KIT ORAL
Qty: 1 EACH | Refills: 0 | Status: SHIPPED | OUTPATIENT
Start: 2023-04-21 | End: 2023-06-01

## 2023-05-17 ENCOUNTER — OFFICE VISIT (OUTPATIENT)
Dept: PSYCHIATRY | Facility: CLINIC | Age: 38
End: 2023-05-17
Payer: COMMERCIAL

## 2023-05-17 VITALS — SYSTOLIC BLOOD PRESSURE: 138 MMHG | HEART RATE: 83 BPM | DIASTOLIC BLOOD PRESSURE: 85 MMHG

## 2023-05-17 DIAGNOSIS — F41.1 GAD (GENERALIZED ANXIETY DISORDER): ICD-10-CM

## 2023-05-17 DIAGNOSIS — F33.1 MAJOR DEPRESSIVE DISORDER, RECURRENT EPISODE, MODERATE DEGREE: Primary | ICD-10-CM

## 2023-05-17 PROCEDURE — 99213 PR OFFICE/OUTPT VISIT, EST, LEVL III, 20-29 MIN: ICD-10-PCS | Mod: S$GLB,,, | Performed by: PSYCHIATRY & NEUROLOGY

## 2023-05-17 PROCEDURE — 3044F PR MOST RECENT HEMOGLOBIN A1C LEVEL <7.0%: ICD-10-PCS | Mod: CPTII,S$GLB,, | Performed by: PSYCHIATRY & NEUROLOGY

## 2023-05-17 PROCEDURE — 1159F PR MEDICATION LIST DOCUMENTED IN MEDICAL RECORD: ICD-10-PCS | Mod: CPTII,S$GLB,, | Performed by: PSYCHIATRY & NEUROLOGY

## 2023-05-17 PROCEDURE — 99213 OFFICE O/P EST LOW 20 MIN: CPT | Mod: S$GLB,,, | Performed by: PSYCHIATRY & NEUROLOGY

## 2023-05-17 PROCEDURE — 3079F PR MOST RECENT DIASTOLIC BLOOD PRESSURE 80-89 MM HG: ICD-10-PCS | Mod: CPTII,S$GLB,, | Performed by: PSYCHIATRY & NEUROLOGY

## 2023-05-17 PROCEDURE — 90833 PSYTX W PT W E/M 30 MIN: CPT | Mod: S$GLB,,, | Performed by: PSYCHIATRY & NEUROLOGY

## 2023-05-17 PROCEDURE — 1159F MED LIST DOCD IN RCRD: CPT | Mod: CPTII,S$GLB,, | Performed by: PSYCHIATRY & NEUROLOGY

## 2023-05-17 PROCEDURE — 90833 PR PSYCHOTHERAPY W/PATIENT W/E&M, 30 MIN (ADD ON): ICD-10-PCS | Mod: S$GLB,,, | Performed by: PSYCHIATRY & NEUROLOGY

## 2023-05-17 PROCEDURE — 3079F DIAST BP 80-89 MM HG: CPT | Mod: CPTII,S$GLB,, | Performed by: PSYCHIATRY & NEUROLOGY

## 2023-05-17 PROCEDURE — 3075F PR MOST RECENT SYSTOLIC BLOOD PRESS GE 130-139MM HG: ICD-10-PCS | Mod: CPTII,S$GLB,, | Performed by: PSYCHIATRY & NEUROLOGY

## 2023-05-17 PROCEDURE — 99999 PR PBB SHADOW E&M-EST. PATIENT-LVL III: CPT | Mod: PBBFAC,,, | Performed by: PSYCHIATRY & NEUROLOGY

## 2023-05-17 PROCEDURE — 3075F SYST BP GE 130 - 139MM HG: CPT | Mod: CPTII,S$GLB,, | Performed by: PSYCHIATRY & NEUROLOGY

## 2023-05-17 PROCEDURE — 99999 PR PBB SHADOW E&M-EST. PATIENT-LVL III: ICD-10-PCS | Mod: PBBFAC,,, | Performed by: PSYCHIATRY & NEUROLOGY

## 2023-05-17 PROCEDURE — 1160F RVW MEDS BY RX/DR IN RCRD: CPT | Mod: CPTII,S$GLB,, | Performed by: PSYCHIATRY & NEUROLOGY

## 2023-05-17 PROCEDURE — 1160F PR REVIEW ALL MEDS BY PRESCRIBER/CLIN PHARMACIST DOCUMENTED: ICD-10-PCS | Mod: CPTII,S$GLB,, | Performed by: PSYCHIATRY & NEUROLOGY

## 2023-05-17 PROCEDURE — 3044F HG A1C LEVEL LT 7.0%: CPT | Mod: CPTII,S$GLB,, | Performed by: PSYCHIATRY & NEUROLOGY

## 2023-05-17 RX ORDER — ESCITALOPRAM OXALATE 10 MG/1
10 TABLET ORAL DAILY
Qty: 90 TABLET | Refills: 1 | Status: SHIPPED | OUTPATIENT
Start: 2023-05-17 | End: 2023-08-08 | Stop reason: SDUPTHER
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
## 2023-05-31 ENCOUNTER — TELEPHONE (OUTPATIENT)
Dept: PRIMARY CARE CLINIC | Facility: CLINIC | Age: 38
End: 2023-05-31
Payer: COMMERCIAL

## 2023-05-31 NOTE — TELEPHONE ENCOUNTER
----- Message from Reena Santos sent at 5/31/2023  2:31 PM CDT -----  Contact: Pt Mobile [phone]  Requesting an RX refill or new RX.  Is this a refill or new RX: Refill  RX name and strength varenicline (CHANTIX STARTING MONTH BOX) 0.5 mg (11)- 1 mg (42) tablet  Is this a 30 day or 90 day RX: 30  Pharmacy name and phone # [id number] - [place], [place] - [address] [facility] AT [facility] [address]  [facility] [address] [facility] [address]  Phone: [phone] Fax: [phone]  The doctors have asked that we provide their patients with the following 2 reminders -- prescription refills can take up to 72 hours, and a friendly reminder that in the future you can use your [facility] account to request refills:   Comment: Patient said that he don't need the starting pack of the varenicline (CHANTIX STARTING MONTH BOX) 0.5 mg (11)- 1 mg (42) tablet, he said that he would like to get the continue pack.

## 2023-06-01 ENCOUNTER — TELEPHONE (OUTPATIENT)
Dept: PRIMARY CARE CLINIC | Facility: CLINIC | Age: 38
End: 2023-06-01
Payer: COMMERCIAL

## 2023-06-01 RX ORDER — VARENICLINE TARTRATE 1 MG/1
1 TABLET, FILM COATED ORAL 2 TIMES DAILY
Qty: 60 TABLET | Refills: 5 | Status: SHIPPED | OUTPATIENT
Start: 2023-06-01

## 2023-06-01 NOTE — TELEPHONE ENCOUNTER
----- Message from Jeremi Ruiz MD sent at 6/1/2023 10:30 AM CDT -----  Chantix continuation prescription sent

## 2023-08-08 ENCOUNTER — OFFICE VISIT (OUTPATIENT)
Dept: PSYCHIATRY | Facility: CLINIC | Age: 38
End: 2023-08-08
Payer: COMMERCIAL

## 2023-08-08 VITALS
WEIGHT: 211.19 LBS | HEART RATE: 95 BPM | SYSTOLIC BLOOD PRESSURE: 132 MMHG | BODY MASS INDEX: 33.08 KG/M2 | DIASTOLIC BLOOD PRESSURE: 76 MMHG

## 2023-08-08 DIAGNOSIS — F41.1 GAD (GENERALIZED ANXIETY DISORDER): Primary | ICD-10-CM

## 2023-08-08 DIAGNOSIS — F43.21 ADJUSTMENT DISORDER WITH DEPRESSED MOOD: ICD-10-CM

## 2023-08-08 PROCEDURE — 1160F RVW MEDS BY RX/DR IN RCRD: CPT | Mod: CPTII,S$GLB,, | Performed by: PSYCHIATRY & NEUROLOGY

## 2023-08-08 PROCEDURE — 99999 PR PBB SHADOW E&M-EST. PATIENT-LVL III: ICD-10-PCS | Mod: PBBFAC,,, | Performed by: PSYCHIATRY & NEUROLOGY

## 2023-08-08 PROCEDURE — 3044F HG A1C LEVEL LT 7.0%: CPT | Mod: CPTII,S$GLB,, | Performed by: PSYCHIATRY & NEUROLOGY

## 2023-08-08 PROCEDURE — 3078F PR MOST RECENT DIASTOLIC BLOOD PRESSURE < 80 MM HG: ICD-10-PCS | Mod: CPTII,S$GLB,, | Performed by: PSYCHIATRY & NEUROLOGY

## 2023-08-08 PROCEDURE — 1160F PR REVIEW ALL MEDS BY PRESCRIBER/CLIN PHARMACIST DOCUMENTED: ICD-10-PCS | Mod: CPTII,S$GLB,, | Performed by: PSYCHIATRY & NEUROLOGY

## 2023-08-08 PROCEDURE — 3075F PR MOST RECENT SYSTOLIC BLOOD PRESS GE 130-139MM HG: ICD-10-PCS | Mod: CPTII,S$GLB,, | Performed by: PSYCHIATRY & NEUROLOGY

## 2023-08-08 PROCEDURE — 99213 OFFICE O/P EST LOW 20 MIN: CPT | Mod: S$GLB,,, | Performed by: PSYCHIATRY & NEUROLOGY

## 2023-08-08 PROCEDURE — 3044F PR MOST RECENT HEMOGLOBIN A1C LEVEL <7.0%: ICD-10-PCS | Mod: CPTII,S$GLB,, | Performed by: PSYCHIATRY & NEUROLOGY

## 2023-08-08 PROCEDURE — 99213 PR OFFICE/OUTPT VISIT, EST, LEVL III, 20-29 MIN: ICD-10-PCS | Mod: S$GLB,,, | Performed by: PSYCHIATRY & NEUROLOGY

## 2023-08-08 PROCEDURE — 3075F SYST BP GE 130 - 139MM HG: CPT | Mod: CPTII,S$GLB,, | Performed by: PSYCHIATRY & NEUROLOGY

## 2023-08-08 PROCEDURE — 90833 PR PSYCHOTHERAPY W/PATIENT W/E&M, 30 MIN (ADD ON): ICD-10-PCS | Mod: S$GLB,,, | Performed by: PSYCHIATRY & NEUROLOGY

## 2023-08-08 PROCEDURE — 3078F DIAST BP <80 MM HG: CPT | Mod: CPTII,S$GLB,, | Performed by: PSYCHIATRY & NEUROLOGY

## 2023-08-08 PROCEDURE — 99999 PR PBB SHADOW E&M-EST. PATIENT-LVL III: CPT | Mod: PBBFAC,,, | Performed by: PSYCHIATRY & NEUROLOGY

## 2023-08-08 PROCEDURE — 3008F BODY MASS INDEX DOCD: CPT | Mod: CPTII,S$GLB,, | Performed by: PSYCHIATRY & NEUROLOGY

## 2023-08-08 PROCEDURE — 1159F MED LIST DOCD IN RCRD: CPT | Mod: CPTII,S$GLB,, | Performed by: PSYCHIATRY & NEUROLOGY

## 2023-08-08 PROCEDURE — 90833 PSYTX W PT W E/M 30 MIN: CPT | Mod: S$GLB,,, | Performed by: PSYCHIATRY & NEUROLOGY

## 2023-08-08 PROCEDURE — 3008F PR BODY MASS INDEX (BMI) DOCUMENTED: ICD-10-PCS | Mod: CPTII,S$GLB,, | Performed by: PSYCHIATRY & NEUROLOGY

## 2023-08-08 PROCEDURE — 1159F PR MEDICATION LIST DOCUMENTED IN MEDICAL RECORD: ICD-10-PCS | Mod: CPTII,S$GLB,, | Performed by: PSYCHIATRY & NEUROLOGY

## 2023-08-08 RX ORDER — BUPROPION HYDROCHLORIDE 150 MG/1
150 TABLET ORAL EVERY MORNING
Qty: 90 TABLET | Refills: 1 | Status: SHIPPED | OUTPATIENT
Start: 2023-08-08 | End: 2024-01-12 | Stop reason: SDUPTHER

## 2023-08-08 RX ORDER — ESCITALOPRAM OXALATE 10 MG/1
10 TABLET ORAL DAILY
Qty: 90 TABLET | Refills: 1 | Status: SHIPPED | OUTPATIENT
Start: 2023-08-08
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
## 2023-09-26 ENCOUNTER — TELEPHONE (OUTPATIENT)
Dept: PRIMARY CARE CLINIC | Facility: CLINIC | Age: 38
End: 2023-09-26
Payer: COMMERCIAL

## 2023-09-26 NOTE — TELEPHONE ENCOUNTER
----- Message from Vanita Hernandez sent at 9/26/2023 10:38 AM CDT -----  Contact: [phone]  No blue slot available to schedule an appointment for the patient.  Patient is established with which PCP: Joseph  Reason for the visit: eval lower back pains  Would the patient like a call back, or a response through their [facility] portal?:  call back

## 2023-09-28 ENCOUNTER — OFFICE VISIT (OUTPATIENT)
Dept: PRIMARY CARE CLINIC | Facility: CLINIC | Age: 38
End: 2023-09-28
Payer: COMMERCIAL

## 2023-09-28 VITALS
OXYGEN SATURATION: 99 % | SYSTOLIC BLOOD PRESSURE: 124 MMHG | HEART RATE: 89 BPM | DIASTOLIC BLOOD PRESSURE: 80 MMHG | HEIGHT: 67 IN | WEIGHT: 200.63 LBS | BODY MASS INDEX: 31.49 KG/M2

## 2023-09-28 DIAGNOSIS — N52.9 ERECTILE DYSFUNCTION, UNSPECIFIED ERECTILE DYSFUNCTION TYPE: ICD-10-CM

## 2023-09-28 DIAGNOSIS — G89.29 CHRONIC MIDLINE LOW BACK PAIN WITH BILATERAL SCIATICA: Primary | ICD-10-CM

## 2023-09-28 DIAGNOSIS — M54.41 CHRONIC MIDLINE LOW BACK PAIN WITH BILATERAL SCIATICA: Primary | ICD-10-CM

## 2023-09-28 DIAGNOSIS — M54.42 CHRONIC MIDLINE LOW BACK PAIN WITH BILATERAL SCIATICA: Primary | ICD-10-CM

## 2023-09-28 PROCEDURE — 3079F PR MOST RECENT DIASTOLIC BLOOD PRESSURE 80-89 MM HG: ICD-10-PCS | Mod: CPTII,S$GLB,, | Performed by: FAMILY MEDICINE

## 2023-09-28 PROCEDURE — 1159F PR MEDICATION LIST DOCUMENTED IN MEDICAL RECORD: ICD-10-PCS | Mod: CPTII,S$GLB,, | Performed by: FAMILY MEDICINE

## 2023-09-28 PROCEDURE — 3074F PR MOST RECENT SYSTOLIC BLOOD PRESSURE < 130 MM HG: ICD-10-PCS | Mod: CPTII,S$GLB,, | Performed by: FAMILY MEDICINE

## 2023-09-28 PROCEDURE — 99999 PR PBB SHADOW E&M-EST. PATIENT-LVL III: CPT | Mod: PBBFAC,,, | Performed by: FAMILY MEDICINE

## 2023-09-28 PROCEDURE — 1160F RVW MEDS BY RX/DR IN RCRD: CPT | Mod: CPTII,S$GLB,, | Performed by: FAMILY MEDICINE

## 2023-09-28 PROCEDURE — 3044F HG A1C LEVEL LT 7.0%: CPT | Mod: CPTII,S$GLB,, | Performed by: FAMILY MEDICINE

## 2023-09-28 PROCEDURE — 1160F PR REVIEW ALL MEDS BY PRESCRIBER/CLIN PHARMACIST DOCUMENTED: ICD-10-PCS | Mod: CPTII,S$GLB,, | Performed by: FAMILY MEDICINE

## 2023-09-28 PROCEDURE — 3074F SYST BP LT 130 MM HG: CPT | Mod: CPTII,S$GLB,, | Performed by: FAMILY MEDICINE

## 2023-09-28 PROCEDURE — 99214 PR OFFICE/OUTPT VISIT, EST, LEVL IV, 30-39 MIN: ICD-10-PCS | Mod: S$GLB,,, | Performed by: FAMILY MEDICINE

## 2023-09-28 PROCEDURE — 3044F PR MOST RECENT HEMOGLOBIN A1C LEVEL <7.0%: ICD-10-PCS | Mod: CPTII,S$GLB,, | Performed by: FAMILY MEDICINE

## 2023-09-28 PROCEDURE — 1159F MED LIST DOCD IN RCRD: CPT | Mod: CPTII,S$GLB,, | Performed by: FAMILY MEDICINE

## 2023-09-28 PROCEDURE — 3008F PR BODY MASS INDEX (BMI) DOCUMENTED: ICD-10-PCS | Mod: CPTII,S$GLB,, | Performed by: FAMILY MEDICINE

## 2023-09-28 PROCEDURE — 3079F DIAST BP 80-89 MM HG: CPT | Mod: CPTII,S$GLB,, | Performed by: FAMILY MEDICINE

## 2023-09-28 PROCEDURE — 3008F BODY MASS INDEX DOCD: CPT | Mod: CPTII,S$GLB,, | Performed by: FAMILY MEDICINE

## 2023-09-28 PROCEDURE — 99214 OFFICE O/P EST MOD 30 MIN: CPT | Mod: S$GLB,,, | Performed by: FAMILY MEDICINE

## 2023-09-28 PROCEDURE — 99999 PR PBB SHADOW E&M-EST. PATIENT-LVL III: ICD-10-PCS | Mod: PBBFAC,,, | Performed by: FAMILY MEDICINE

## 2023-09-28 RX ORDER — DICLOFENAC SODIUM 75 MG/1
75 TABLET, DELAYED RELEASE ORAL 2 TIMES DAILY
Qty: 20 TABLET | Refills: 0 | Status: SHIPPED | OUTPATIENT
Start: 2023-09-28

## 2023-09-28 RX ORDER — METHOCARBAMOL 500 MG/1
TABLET, FILM COATED ORAL
Qty: 30 TABLET | Refills: 0 | Status: SHIPPED | OUTPATIENT
Start: 2023-09-28

## 2023-09-28 RX ORDER — TADALAFIL 20 MG/1
20 TABLET ORAL DAILY
Qty: 30 TABLET | Refills: 11 | Status: SHIPPED | OUTPATIENT
Start: 2023-09-28 | End: 2024-09-27

## 2023-09-28 NOTE — PROGRESS NOTES
"    /80 (BP Location: Left arm, Patient Position: Sitting, BP Method: Large (Manual))   Pulse 89   Ht 5' 7" (1.702 m)   Wt 91 kg (200 lb 9.9 oz)   SpO2 99%   BMI 31.42 kg/m²       ===========    Chief Complaint: No chief complaint on file.          [facility]    Varinder Salazar is a 37 y.o. male     here for    Chronic LBP.    Had MRI May 2021 with report as follows:    "TECHNIQUE:  Multiplanar, multisequence MR images were acquired from the thoracolumbar junction to the sacrum without the administration of contrast.     COMPARISON:  Lumbar spine series 07/23/2019, CT abdomen pelvis 09/19/2013     FINDINGS:  There is normal lumbar spine alignment.     Marrow signal appears normal.  There are no compression fractures.     The tip of the conus is at the level of the upper L2 vertebra.  The visualized spinal cord demonstrates normal signal as imaged.     T12-L1, L1-L2: There is no evidence of dorsal disc abnormality or canal or neural foraminal stenosis.     L2-L3: There is no dorsal focal disc abnormality or canal or neural foraminal stenosis.  A 5 mm facet cyst is incidentally noted extending laterally into the paraspinous musculature as noted on image 22 series 6.     L3-L4: There is no dorsal disc abnormality or canal or neural foraminal stenosis.     L4-L5: Mild facet hypertrophy is noted bilaterally.  There is no dorsal disc abnormality or stenosis.     L5-S1: There is a broad-based left paracentral through left foraminal disc protrusion contributing to mild left neural foraminal stenosis with mild possible contact of the exiting left L5 nerve root.  No significant lateral recess stenosis on the left or canal or right neural foraminal stenosis.  Facet hypertrophic changes also contribute to the foraminal stenosis on the left.     Imaged paraspinous structures and soft tissues are unremarkable.     Impression:     L5-S1 broad-based disc protrusion in the left paracentral through left foraminal region " "contributing to mild left neural foraminal stenosis.     There is no other disc protrusion or stenosis throughout the lumbar spine.     No fractures or other significant findings."    Had xray of l spine in dec 2022    Report :    "Lateral imaging demonstrates adequate alignment of the lumbar spine without significant vertebral body height loss.  There is mild disc space height loss at L5-S1.  The facet joints are aligned.  The sacral segments are aligned.  AP spinal alignment is grossly unremarkable.  The bilateral sacroiliac joints are intact.  The pubic symphysis is intact.     Impression:     1. No acute displaced fracture or dislocation of the lumbar spine"   .      Patient queried and denies any further complaints    Patient has MEDICAL HISTORY OF  Patient Active Problem List   Diagnosis    Lateral epicondylitis (tennis elbow)    Chronic left-sided low back pain with left-sided sciatica    Chronic midline low back pain without sciatica    Misuse of prescription only drugs    Current moderate episode of major depressive disorder    Generalized anxiety disorder    Dysfunction of eustachian tube    Depressive disorder    Recurrent major depressive disorder, in partial remission    Severe obesity (BMI 35.0-39.9) with comorbidity       SURGICAL AND MEDICAL HISTORY: updated and reviewed.  Past Surgical History:   Procedure Laterality Date    CYST REMOVAL      ROBOT-ASSISTED LAPAROSCOPIC REPAIR OF INGUINAL HERNIA USING DA MARCOS XI Left 6/24/2020    Procedure: XI ROBOTIC REPAIR, HERNIA, INGUINAL Left;  Surgeon: Vinayak Guaman MD;  Location: [facility] OR [address] [place] [address];  Service: General;  Laterality: Left;    WISDOM TOOTH EXTRACTION       ALLERGIES updated and reviewed.  Review of patient's allergies indicates:  No Known Allergies    CURRENT OUTPATIENT MEDICATIONS updated and reviewed    Current Outpatient Medications:     buPROPion (WELLBUTRIN XL) 150 MG TB24 tablet, Take 1 tablet (150 mg total) by mouth every morning., " "Disp: 90 tablet, Rfl: 1    EScitalopram oxalate (LEXAPRO) 10 MG tablet, Take 1 tablet (10 mg total) by mouth once daily., Disp: 90 tablet, Rfl: 1    multivitamin capsule, Take 1 capsule by mouth once daily., Disp: , Rfl:     varenicline (CHANTIX) 1 mg Tab, Take 1 tablet (1 mg total) by mouth 2 (two) times daily., Disp: 60 tablet, Rfl: 5    sildenafiL (VIAGRA) 50 MG tablet, Take 1 tablet (50 mg total) by mouth daily as needed for Erectile Dysfunction. (Patient not taking: Reported on 12/23/2022), Disp: 30 tablet, Rfl: 1    Review of Systems    /80 (BP Location: Left arm, Patient Position: Sitting, BP Method: Large (Manual))   Pulse 89   Ht 5' 7" (1.702 m)   Wt 91 kg (200 lb 9.9 oz)   SpO2 99%   BMI 31.42 kg/m²   Physical Exam    ASSESSMENT/PLAN  There are no diagnoses linked to this encounter.        All lab results over past 2 years available reviewed inc labs and any cardiology or radiology studies.  Any new prescription medications gone over in detail including reason for taking the medication, the general mechanism of action, most common possible side effects and possible costs, etcetera.    Chronic conditions updated. Other than changes or additions as above, cont current medications and maintain follow-up with specialists if indicated.     Jeremi Ruiz MD  A dictation device was used to produce this document. Use of such devices sometimes results in grammatical errors or replacement of words that sound similarly.                      "

## 2023-09-30 PROBLEM — G89.29 CHRONIC MIDLINE LOW BACK PAIN WITHOUT SCIATICA: Status: RESOLVED | Noted: 2020-03-16 | Resolved: 2023-09-30

## 2023-09-30 PROBLEM — N52.9 ERECTILE DYSFUNCTION: Status: ACTIVE | Noted: 2023-09-30

## 2023-09-30 PROBLEM — M54.50 CHRONIC MIDLINE LOW BACK PAIN WITHOUT SCIATICA: Status: RESOLVED | Noted: 2020-03-16 | Resolved: 2023-09-30

## 2023-09-30 PROBLEM — M54.41 CHRONIC MIDLINE LOW BACK PAIN WITH BILATERAL SCIATICA: Status: ACTIVE | Noted: 2019-07-24

## 2024-01-12 ENCOUNTER — TELEPHONE (OUTPATIENT)
Dept: PSYCHIATRY | Facility: CLINIC | Age: 39
End: 2024-01-12
Payer: COMMERCIAL

## 2024-01-12 DIAGNOSIS — F43.21 ADJUSTMENT DISORDER WITH DEPRESSED MOOD: Primary | ICD-10-CM

## 2024-01-12 DIAGNOSIS — F33.1 MAJOR DEPRESSIVE DISORDER, RECURRENT EPISODE, MODERATE DEGREE: ICD-10-CM

## 2024-01-12 RX ORDER — BUPROPION HYDROCHLORIDE 150 MG/1
150 TABLET ORAL EVERY MORNING
Qty: 90 TABLET | Refills: 0 | Status: SHIPPED | OUTPATIENT
Start: 2024-01-12

## 2024-01-12 NOTE — TELEPHONE ENCOUNTER
Former pt of Dr. Acosta(now retired) requesting bupropion XL refill. L/s in August when plan made to restart the medication. Has f/u with new provider Dr. Malik 4/23/24. Will consult with Dr. Parrish for review/approval, as deemed appropriate, of bridge supply of requested med.

## 2024-04-23 ENCOUNTER — OFFICE VISIT (OUTPATIENT)
Dept: PSYCHIATRY | Facility: CLINIC | Age: 39
End: 2024-04-23
Payer: MEDICAID

## 2024-04-23 VITALS
WEIGHT: 206.13 LBS | DIASTOLIC BLOOD PRESSURE: 85 MMHG | BODY MASS INDEX: 32.28 KG/M2 | SYSTOLIC BLOOD PRESSURE: 131 MMHG | HEART RATE: 88 BPM

## 2024-04-23 DIAGNOSIS — F33.1 MAJOR DEPRESSIVE DISORDER, RECURRENT EPISODE, MODERATE DEGREE: Primary | ICD-10-CM

## 2024-04-23 DIAGNOSIS — F17.210 NICOTINE DEPENDENCE, CIGARETTES, UNCOMPLICATED: ICD-10-CM

## 2024-04-23 DIAGNOSIS — Z63.5 DIVORCE: ICD-10-CM

## 2024-04-23 DIAGNOSIS — F12.91 CANNABIS USE DISORDER IN REMISSION: ICD-10-CM

## 2024-04-23 DIAGNOSIS — F41.9 ANXIETY DISORDER, UNSPECIFIED TYPE: ICD-10-CM

## 2024-04-23 PROCEDURE — 99999 PR PBB SHADOW E&M-EST. PATIENT-LVL II: CPT | Mod: PBBFAC,AF,HB, | Performed by: PSYCHIATRY & NEUROLOGY

## 2024-04-23 PROCEDURE — 99212 OFFICE O/P EST SF 10 MIN: CPT | Mod: PBBFAC | Performed by: PSYCHIATRY & NEUROLOGY

## 2024-04-23 PROCEDURE — 99417 PROLNG OP E/M EACH 15 MIN: CPT | Mod: AF,HB,S$PBB, | Performed by: PSYCHIATRY & NEUROLOGY

## 2024-04-23 PROCEDURE — 99215 OFFICE O/P EST HI 40 MIN: CPT | Mod: AF,HB,S$PBB, | Performed by: PSYCHIATRY & NEUROLOGY

## 2024-04-23 RX ORDER — ESCITALOPRAM OXALATE 10 MG/1
10 TABLET ORAL DAILY
Qty: 30 TABLET | Refills: 3 | Status: SHIPPED | OUTPATIENT
Start: 2024-04-23 | End: 2024-08-21

## 2024-04-23 RX ORDER — BUPROPION HYDROCHLORIDE 300 MG/1
300 TABLET ORAL DAILY
Qty: 30 TABLET | Refills: 3 | Status: SHIPPED | OUTPATIENT
Start: 2024-04-23 | End: 2024-08-21

## 2024-04-23 SDOH — SOCIAL DETERMINANTS OF HEALTH (SDOH): DISRUPTION OF FAMILY BY SEPARATION AND DIVORCE: Z63.5

## 2024-04-23 NOTE — PATIENT INSTRUCTIONS
"  PLAN:     Sarika SIMPSON flowed request to scheduling for Follow uip July 3 2024 @ 3 pm TELEHEALTH  THO PATIENT  NEEDS TO CONFIRM APPOINTMENT  by seeing such appointment  appear on their "My Chart" isidro.      NOTE:  IF  appointment is not visible, THEN patient is  instructed to call [facility] ([facility]) at:  [phone]  and coordinate appointment scheduling with . Understanding Expressed.      Info on Counselors:    1)  [facility], [facility]: ([phone])   and ask to set up an intake eval    Website: ([email or web address]   (Copy and paste to your browser)     Current Conditions Treated:  PTSD  Insomnia  Panic Attacks  Depression  Anxiety  Chronic Pain  Stress    The [facility] is ideal for patients who:  Desire short-term rather than long-term psychotherapy  Want structured psychotherapy sessions for specific targets  Are willing to engage in daily practice assignments  Can commit to weekly psychotherapy for a time-limited period (usually between 6-12 weeks)    Note: Possibility of virtual  (telehealth) participation may exist    call for more information ([phone])     2) Individual Counseling:     A) Call [facility]              [phone] (there may be significant wait times)     B) Contact   your  insurance carrier:  via their Website or call and ask for assistance    C) Psychology Today: www.psychologytoday.com/us/therapists     D) [facility] ([facility]) :   [phone] [email or web address]      (website shows accepts "walk ins"; "perhaps double check"    E)  [facility]:  [email or web address]               [phone]    F) [facility]: Contact [facility] :   [phone] [email or web address]      Sarika Meds:   Wellbutrin XL advanced to 300 mg as mutually agreed over 30 x 3  Lexapro 10 mg once " daily  Risks benefits as discussed   Read package insert of all medication  for further detail / ask psych MD if any questions    References:     Relaxation stress reduction workbook: ROULA Ruiz PhD ( used: $7-10)    Feeling Good Website: Eduardo Vera MD / www.[employer]com website (free) / vinny. PODCASTS    Anxiety &  phobia workbook by NOEMI Vega PhD  (web retailers: used: $ 7-10)    [place]: Path to Better Sleep : https://www.veterantraining.[place].gov/insomnia/ (free)     QUIT WITH [facility] : https://quitwithusla.org/prepare/     [facility] [phone]: (www.[employer].org/StopSmoking ; Quit smoking the Easy Way by Luis Kelsey as well as some motivational concepts of alternative stresss mangement / relaxation skills and peer support      Pt expressed appreciation for the visit today and did not have further question at this time though pt  was still informed to:     Call  if problems.    Call / Report Side Effects to Psyc MD     Encouraged to follow up with primary care / Gen Med MD for continued monitoring of general health and wellness.    Understanding was expressed; and no further concerns nor questions were raised at this time.     Remember healthy self care:   eat right  attempt adequate rest   HANDWASHING / encourage such vinny. During this corona virus time   walk or light exercise within reason and as your general med team approves  read or explore any of reference materials / homework mentioned  reach out (I.e.,  connect with)  others who nuture and bring out best in you  avoid risky behaviors    Keep your appointments:    IF you  cannot make your appt THEN please call [phone]  or go online (via My Chart isidro) to reschedule.    It is the responsibility of the patient to reschedule an appointment if an appointment has been canceled or missed.    Avoid  alcohol and illicit substances.  Look for the positive.  All is often relative-seek balance  Call sooner if needed : [phone]  Call  911 or go to Emergency Room  (ER)  if  any acute concerns

## 2024-04-23 NOTE — PROGRESS NOTES
"  PSYCHIATRIC EVALUATION    Disclaimer: Evaluation and treatment is based on information presented to date. Any new information may affect assessment and findings.     Note:Face to Face time with patient: 75 minutes of total time spent on the encounter, which includes face to face time and non-face to face time preparing to see the patient including but not limited to: 1) Obtaining and/or reviewing separately obtained history, 2) Documenting clinical information in the electronic or other health record, 3) Independently  reviewing / interpreting results as clinically indicated ; 4) discussing medication options including risks and benefits as well as 5) recommendations  for therapeutic interventions and 5) where appropriate additional educational resources (ex: reference books / websites); 6) communicating assessment and plan of care to the patient/family/caregiver  7) explaining importance of keeping appts ; and 8) rescheduling IF miss appt  IF acute concerns to call 911 or go to nearest ER.     Name: Varinder Salazar  Age: 38 y.o. 1985  Date of Encounter: 4/23/2024    Sources of Information:  Patient  Interview and chart review     Chief Complaint:  "Continuity of care former patient of Bob Acosta MD"    Referred by: (Whose idea to come see Psychiatry) : admin transfer     History of Present Illness    Varinder Salazar a 38 y.o.  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     College grad and masters degree   Has a background in business administration and finance    He is pleasant goal-directed calm; says he is between jobs.    Has some job opportunities he is looking at    Mom was especially ; dad oversaw [employer] [facility]    He has had 1 psych hospitalization past; around the time of some marital stress.    He has 2 children (2 years old and 11 years old).  He has in a custody coppola with ex-wife.  Wife only wants to give him supervised visitation.  Says she always pains others in " the worst anxiety has an         4/23/2024     7:18 PM   GAD7   1. Feeling nervous, anxious, or on edge? 2   2. Not being able to stop or control worrying? 2   3. Worrying too much about different things? 2   4. Trouble relaxing? 2   5. Being so restless that it is hard to sit still? 3   6. Becoming easily annoyed or irritable? 0   7. Feeling afraid as if something awful might happen? 2   8. If you checked off any problems, how difficult have these problems made it for you to do your work, take care of things at home, or get along with other people? 1   BECKY-7 Score 13          4/23/2024     7:18 PM 6/1/2021     9:39 AM 12/13/2017    10:59 AM   Depression Patient Health Questionnaire   Over the last two weeks how often have you been bothered by little interest or pleasure in doing things More than half the days Not at all Not at all   Over the last two weeks how often have you been bothered by feeling down, depressed or hopeless More than half the days Not at all Not at all   PHQ-2 Total Score 4 0 0   Over the last two weeks how often have you been bothered by trouble falling or staying asleep, or sleeping too much Nearly every day     Over the last two weeks how often have you been bothered by feeling tired or having little energy More than half the days     Over the last two weeks how often have you been bothered by a poor appetite or overeating Several days     Over the last two weeks how often have you been bothered by feeling bad about yourself - or that you are a failure or have let yourself or your family down More than half the days     Over the last two weeks how often have you been bothered by trouble concentrating on things, such as reading the newspaper or watching television More than half the days     Over the last two weeks how often have you been bothered by moving or speaking so slowly that other people could have noticed. Or the opposite - being so fidgety or restless that you have been  moving around a lot more than usual. Several days     Over the last two weeks how often have you been bothered by thoughts that you would be better off dead, or of hurting yourself Not at all     If you checked off any problems, how difficult have these problems made it for you to do your work, take care of things at home or get along with other people? Somewhat difficult     PHQ-9 Score 15     PHQ-9 Interpretation Moderately Severe              4/23/2024     7:19 PM   MDQ Scale   you felt so good or so hyper that other people thought you were not your normal self or you were so hyper that you got into trouble? 1   you were so irritable that you shouted at people or started fights or arguments? 1   you felt much more self-confident than usual? 0   you got much less sleep than usual and found that you didn't really miss it? 0   you were more talkative or spoke much faster than usual? 0   thoughts raced through your head or you couldn't slow your mind down? 1   you were so easily distracted by things around you that you had trouble concentrating or staying on track? 1   you had more energy than usual? 0   you were much more active or did many more things than usual? 0   you were much more social or outgoing than usual, for example, you telephoned friends in the middle of the night? 0   you were much more interested in sex than usual? 1   you did things that were unusual for you or that other people might have thought were excessive, foolish, or risky? 1   spending money got you or your family in trouble? 1   If you checked YES to more than one of the above, have several of these ever happened during the same period of time? 1   How much of a problem did any of these cause you - like being unable to work; having family, money or legal troubles; getting into arguments or fights? Moderate problem   Mood Disorder Questionnaire Score  7      On 4- : completed raw data of biopsychosocial rating scale: (Talha Ricks  3.0) upload to ADIKTIVO.       >>  Excerpt of  [facility] note  from 8-8-23 :  Pt returned in person casually dressed and neatly groomed.  He reported that his mood has improved noticeably since restarting Lexapro 10 mg daily.  He is less anxious and upset over life events, without returning to the amotivational state which he experienced before at 20 mg.  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  He has regular custody of his children and he is dating again.  Unfortunately in [facility], the domestic violence charge which he says is false can never be expunged.     Medication was reviewed.  Pt reported he still has some sadness and decreased energy.  He is also experiencing sexual dysfunction on Lexapro.  Changing timing of the the dose has not helped.  He benefited from Wellbutrin in addition to Lexapro in the past and would like to try that again.  Seizure risk was discussed again and 150 mg of XR was prescribed daily.  He was asked to return in person in 3 months.  My [facility] in 3 months was explained.  He agreed to schedule now f/u with a prescribing provider in this clinic in 4 months.    >>    History:     Past Psychiatric History:   Previous therapy: Yes and in past; helpful therapist left  Previous psychiatric hospitalizations: Yes and [facility] job marital stress   Previous diagnoses:  depression and anxiety  Previous suicide attempts: No    Abuse History:   Physical Abuse: No  Sexual Abuse: No  Other Abuse / Trauma : No    Substance Abuse History:  Use of alcohol:  social occasion 1 day week/ no control issue  Tobacco use:  1 pack last 4 day to 1 week / started 22y   Cannabis:  gummies occasion at night // did SMOKE CANNABIS 21y to 25 or 26y / DAILY 1/2 blunt  Recreational drugs:  denies    Family History Mental Health:   Suicide :  No  Other:   mom depression / aunt bipolar      Weapons: No    Psyc Meds:    Lexapro 10mg (says helps tho some sexual side effect tho Cialis has resolved)   Wellbutrin  interest to go 300 mg    Top 3 Stressors:  child custody issues / legal issues / ex wife wanting supervised visits //  kids 11y and 2 y // financial stability //             6/1/2021     9:39 AM 12/13/2017    10:59 AM   Depression Patient Health Questionnaire   Over the last two weeks how often have you been bothered by little interest or pleasure in doing things Not at all Not at all   Over the last two weeks how often have you been bothered by feeling down, depressed or hopeless Not at all Not at all   PHQ-2 Total Score 0 0       Review Of Systems:     Medical Review Of Systems:  Patient Active Problem List   Diagnosis    Lateral epicondylitis (tennis elbow)    Chronic midline low back pain with bilateral sciatica    Misuse of prescription only drugs    Current moderate episode of major depressive disorder    Generalized anxiety disorder    Dysfunction of eustachian tube    Depressive disorder    Recurrent major depressive disorder, in partial remission    Severe obesity (BMI 35.0-39.9) with comorbidity    Erectile dysfunction      Past Surgical History:   Procedure Laterality Date    CYST REMOVAL      ROBOT-ASSISTED LAPAROSCOPIC REPAIR OF INGUINAL HERNIA USING DA MARCOS XI Left 6/24/2020    Procedure: XI ROBOTIC REPAIR, HERNIA, INGUINAL Left;  Surgeon: Vinayak Guaman MD;  Location: [facility] OR [address] [place] [address];  Service: General;  Laterality: Left;    WISDOM TOOTH EXTRACTION        Musculoskeletal :  no tremor    History Seizures? no    History Head Injury?  no    Current Evaluation:     Mental Status Exam:   Appearance: casual /   Oriented: x 3 / including: Date: April 23, 2024 ; and aware that [place]: [facility] [place]  Attitude: cooperative pleasant   Eye Contact: good   Behavior: calm here   Mood: generally ok tho thinks about move so not have to deal with flooding like Katherin   Cognition: alert / 20-3: 17, 14, 11, 8,  5 ,2 , -1  Concentration: grossly intact   Affect: appropriate range   Anxiety: mild mod  Thought Process: goal directed   Speech: Volume : WNL   Quantity WNL   Quality: appears to openly answer questions   Eye Contact: good   Insight:  acknowledges some anxiety depression and interested in treatment    Threats: no SI / no HI   Memory: intact (as relay information across time spans) Pres?    BIDEN    Prior Pres? TRUMP  Psychosis: denies all   Estimate of Intellectual Function: average   Judgment (to simple situation): if saw a house on fire / A: call fire dept /  see no one there    Impulse Control: no history SI / nor HI ; calm here     3 wishes ?  Financial stability / nothing else       Current Outpatient Medications:     buPROPion (WELLBUTRIN XL) 300 MG 24 hr tablet, Take 1 tablet (300 mg total) by mouth once daily., Disp: 30 tablet, Rfl: 3    diclofenac (VOLTAREN) 75 MG EC tablet, Take 1 tablet (75 mg total) by mouth 2 (two) times daily. W food and water for 3-10 days for pain and inflammation, Disp: 20 tablet, Rfl: 0    EScitalopram oxalate (LEXAPRO) 10 MG tablet, Take 1 tablet (10 mg total) by mouth once daily., Disp: 30 tablet, Rfl: 3    methocarbamoL (ROBAXIN) 500 MG Tab, One po tid x 3-10 days, Disp: 30 tablet, Rfl: 0    multivitamin capsule, Take 1 capsule by mouth once daily., Disp: , Rfl:     tadalafiL (CIALIS) 20 MG Tab, Take 1 tablet (20 mg total) by mouth once daily., Disp: 30 tablet, Rfl: 11    varenicline (CHANTIX) 1 mg Tab, Take 1 tablet (1 mg total) by mouth 2 (two) times daily., Disp: 60 tablet, Rfl: 5     Psychosocial History :    City Born:  [place]    Siblings (full)  Brothers: one 6y older Edson / patent officer // lives [facility] // has girl boy  Sisters: one 10y older  Nataliia benefit admin [facility] // [facility] she 2 girls   Pt is youngest     Parents : dad  complications covid 76y  // mom alive 73  Dorie     Briefly Describe  your Mom: supportive /  present   Briefly  "Describe your Dad: supportive present / "geat relationship with parents    Bio Mom: Occupation: spec  [employer] [place] / [place] 7   Bio Dad:  Occupation:  over [employer] (keyboard [employer] )    Marital Status: once x 10 yrs / Tremida 38y ()     Children   Girls  (ages): none  Boys (ages):  2y old: Navneet // 11y Shiraz     Education: [employer] /  [employer] 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 Custody kids   DWI ?n    Ever homeless? 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 4-5 yrs   [employer] [place]    On Disability? Not on not applying    Q: Is there anything else that you think I should know about that I have not asked about?  n      Assessment - Diagnosis - Goals:     Encounter Diagnoses   Name Primary?    Major depressive disorder, recurrent episode, moderate degree Yes    Nicotine dependence, cigarettes, uncomplicated     Anxiety disorder, unspecified type     Divorce / child custody issues still pending April 2024:  (2y old and 11 y old)     Cannabis use disorder in FULL REMISSION [did SMOKE CANNABIS 21y to 25 or 26y / DAILY 1/2 blunt]         Patient Instructions     PLAN:     Psmoses SIMPSON flowed request to scheduling for Follow uip July 3 2024 @ 3 pm TELEHEALTH  THO PATIENT  NEEDS TO CONFIRM APPOINTMENT  by seeing such appointment  appear on their "My Chart" isidro.      NOTE:  IF  appointment is not visible, THEN patient is  instructed to call [facility] ([facility]) at:  [phone]  and coordinate appointment scheduling with . Understanding Expressed.      Info on Counselors:    1)  [facility] program, [facility]: ([phone])   and ask to set up an intake eval    Website: (https://[email or web address]/brief-[email or web address]-psychotherapy " "  (Copy and paste to your browser)     Current Conditions Treated:  PTSD  Insomnia  Panic Attacks  Depression  Anxiety  Chronic Pain  Stress    The [facility] is ideal for patients who:  Desire short-term rather than long-term psychotherapy  Want structured psychotherapy sessions for specific targets  Are willing to engage in daily practice assignments  Can commit to weekly psychotherapy for a time-limited period (usually between 6-12 weeks)    Note: Possibility of virtual  (telehealth) participation may exist    call for more information ([phone])     2) Individual Counseling:     A) Call [facility]              [phone] (there may be significant wait times)     B) Contact   your  insurance carrier:  via their Website or call and ask for assistance    C) Psychology Today: [email or web address]     D) [facility]) :   [phone] [email or web address]      (website shows accepts "walk ins"; "perhaps double check"    E)  [facility]:  [email or web address]               [phone]    F) [facility]: Contact [facility] :   [phone] [email or web address]      Psyc Meds:   Wellbutrin XL advanced to 300 mg as mutually agreed over 30 x 3  Lexapro 10 mg once daily  Risks benefits as discussed   Read package insert of all medication  for further detail / ask psych MD if any questions    References:     Relaxation stress reduction workbook: ROULA Ruiz PhD ( used: $7-10)    Feeling Good Website: Eduardo Vera MD / www.Furnish.co.uk website (free) / vinny. PODCASTS    Anxiety &  phobia workbook by NOEMI Vega PhD  (web retailers: used: $ 7-10)    VA: Path to Better Sleep : https://www.veterantraining.va.gov/insomnia/ (free)     QUIT WITH [facility] : https://[email or web address].org/prepare/     [facility] [phone]: ([email or web address] ; Quit smoking the Easy Way by Luis Kelsey " as well as some motivational concepts of alternative stresss mangement / relaxation skills and peer support      Pt expressed appreciation for the visit today and did not have further question at this time though pt  was still informed to:     Call  if problems.    Call / Report Side Effects to Psyc MD     Encouraged to follow up with primary care / Gen Med MD for continued monitoring of general health and wellness.    Understanding was expressed; and no further concerns nor questions were raised at this time.     Remember healthy self care:   eat right  attempt adequate rest   HANDWASHING / encourage such vinny. During this corona virus time   walk or light exercise within reason and as your general med team approves  read or explore any of reference materials / homework mentioned  reach out (I.e.,  connect with)  others who nuture and bring out best in you  avoid risky behaviors    Keep your appointments:    IF you  cannot make your appt THEN please call [phone]  or go online (via My Chart isidro) to reschedule.    It is the responsibility of the patient to reschedule an appointment if an appointment has been canceled or missed.    Avoid  alcohol and illicit substances.  Look for the positive.  All is often relative-seek balance  Call sooner if needed : [phone]  Call 911 or go to Emergency Room  (ER)  if  any acute concerns

## 2024-07-03 ENCOUNTER — OFFICE VISIT (OUTPATIENT)
Dept: PSYCHIATRY | Facility: CLINIC | Age: 39
End: 2024-07-03

## 2024-07-03 DIAGNOSIS — Z63.5 DIVORCE: ICD-10-CM

## 2024-07-03 DIAGNOSIS — F41.9 ANXIETY DISORDER, UNSPECIFIED TYPE: ICD-10-CM

## 2024-07-03 DIAGNOSIS — F33.1 MAJOR DEPRESSIVE DISORDER, RECURRENT EPISODE, MODERATE DEGREE: Primary | ICD-10-CM

## 2024-07-03 DIAGNOSIS — F12.91 CANNABIS USE DISORDER IN REMISSION: ICD-10-CM

## 2024-07-03 DIAGNOSIS — F17.210 NICOTINE DEPENDENCE, CIGARETTES, UNCOMPLICATED: ICD-10-CM

## 2024-07-03 RX ORDER — BUPROPION HYDROCHLORIDE 300 MG/1
300 TABLET ORAL DAILY
Qty: 30 TABLET | Refills: 3 | Status: SHIPPED | OUTPATIENT
Start: 2024-07-03 | End: 2024-10-31

## 2024-07-03 RX ORDER — ESCITALOPRAM OXALATE 10 MG/1
10 TABLET ORAL DAILY
Qty: 30 TABLET | Refills: 3 | Status: SHIPPED | OUTPATIENT
Start: 2024-07-03 | End: 2024-10-31

## 2024-07-03 SDOH — SOCIAL DETERMINANTS OF HEALTH (SDOH): DISRUPTION OF FAMILY BY SEPARATION AND DIVORCE: Z63.5

## 2024-07-03 NOTE — PROGRESS NOTES
Telehealth Session Info    The patient location is: Patient's home [facility] .  Patient reported that his/her location at the time of this visit was in the [facility]     Participants on call:    I also informed patient of the following:     Eduardo Malik MD , an Employee of [facility] / [place] /   [facility]  / Jose Alberto Miles    Each patient to whom he or she provides medical services by telemedicine is: (1) informed of the relationship between the physician and patient and the respective role of any other health care provider with respect to management of the patient; and (2) notified that he or she may decline to receive medical services by telemedicine and may withdraw from such care at any time.    If technology issues arise during call,  pt informed that MD will attempt to call pt back / as well:  pt may call office phone: [phone] in attempt to reconnect.    If pt has questions related to privacy practices or records: pt instructed to contact [facility] Information Department:     Pt informed that IF acute concerns to: Dial 911 or go to nearest Emergency Room (ER)    Understanding Expressed    Varinder Salazar   1985   07/03/2024     Disclaimer: Evaluation and treatment is based on information presented to date. Any new information may affect assessment and findings.     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 Feeling nervous, anxious, or on edge? 1 2   2. Not being able to stop or control worrying? 2 2   3. Worrying too much about different things? 1 2   4. Trouble relaxing? 1 2   5. Being so restless that it is hard to sit still? 1 3   6. Becoming easily annoyed or irritable? 1 0   7. Feeling afraid as if something awful might happen? 1 2   8. If you checked off any problems, how difficult have these problems made it for you to do your work, take care of things at home, or get along with other people? 1 1   BECKY-7 Score 8 13          7/3/2024     3:25 PM 4/23/2024     7:18 PM 6/1/2021     9:39 AM 12/13/2017    10:59 AM   Depression Patient Health Questionnaire   Over the last two weeks how often have you been bothered by little interest or pleasure in doing things Not at all More than half the days Not at all Not at all   Over the last two weeks how often have you been bothered by feeling down, depressed or hopeless Not at all More than half the days Not at all Not at all   PHQ-2 Total Score 0 4 0 0   Over the last two weeks how often have you been bothered by trouble falling or staying asleep, or sleeping too much More than half the days Nearly every day     Over the last two weeks how often have you been bothered by feeling tired or having little energy Several days More than half the days     Over the last two weeks how often have you been bothered by a poor appetite or overeating Not at all Several days     Over the last two weeks how often have you been bothered by feeling bad about yourself - or that you are a failure or have let yourself or your family down Not at all More than half the days     Over the last two weeks how often have you been bothered by trouble concentrating on things, such as reading the newspaper or watching  television More than half the days More than half the days     Over the last two weeks how often have you been bothered by moving or speaking so slowly that other people could have noticed. Or the opposite - being so fidgety or restless that you have been moving around a lot more than usual. Several days Several days     Over the last two weeks how often have you been bothered by thoughts that you would be better off dead, or of hurting yourself Not at all Not at all     If you checked off any problems, how difficult have these problems made it for you to do your work, take care of things at home or get along with other people? Somewhat difficult Somewhat difficult     PHQ-9 Score 6 15     PHQ-9 Interpretation Mild Moderately Severe       Psyc Medication:  likes such no side effects no med issues    Wellbutrin  mg daily  Lexapro 10 mg daily    Constitutional Health Concerns:      Patient Active Problem List   Diagnosis    Lateral epicondylitis (tennis elbow)    Chronic midline low back pain with bilateral sciatica    Misuse of prescription only drugs    Current moderate episode of major depressive disorder    Anxiety disorder    Dysfunction of eustachian tube    Depressive disorder    Recurrent major depressive disorder, in partial remission    Severe obesity (BMI 35.0-39.9) with comorbidity    Erectile dysfunction    Nicotine dependence, cigarettes, uncomplicated    Divorce / child custody issues still pending April 2024:  (2y old and 11 y old)    Major depressive disorder, recurrent episode, moderate degree    Cannabis use disorder in FULL REMISSION [did SMOKE CANNABIS 21y to 25 or 26y / DAILY 1/2 blunt]        Wt Readings from Last 3 Encounters:   04/23/24 93.5 kg (206 lb 2.1 oz)   09/28/23 91 kg (200 lb 9.9 oz)   08/08/23 95.8 kg (211 lb 3.2 oz)        Laboratory Data  No visits with results within 1 Month(s) from this visit.   Latest known visit with results is:   Lab Visit on 02/28/2023   Component Date  Value Ref Range Status    WBC 02/28/2023 10.90  3.90 - 12.70 K/uL Final    RBC 02/28/2023 5.14  4.60 - 6.20 M/uL Final    Hemoglobin 02/28/2023 14.9  14.0 - 18.0 g/dL Final    Hematocrit 02/28/2023 46.1  40.0 - 54.0 % Final    MCV 02/28/2023 90  82 - 98 fL Final    MCH 02/28/2023 29.0  27.0 - 31.0 pg Final    MCHC 02/28/2023 32.3  32.0 - 36.0 g/dL Final    RDW 02/28/2023 13.5  11.5 - 14.5 % Final    Platelets 02/28/2023 334  150 - 450 K/uL Final    MPV 02/28/2023 10.4  9.2 - 12.9 fL Final    Sodium 02/28/2023 141  136 - 145 mmol/L Final    Potassium 02/28/2023 4.0  3.5 - 5.1 mmol/L Final    Chloride 02/28/2023 105  95 - 110 mmol/L Final    CO2 02/28/2023 26  23 - 29 mmol/L Final    Glucose 02/28/2023 81  70 - 110 mg/dL Final    BUN 02/28/2023 10  6 - 20 mg/dL Final    Creatinine 02/28/2023 1.1  0.5 - 1.4 mg/dL Final    Calcium 02/28/2023 9.7  8.7 - 10.5 mg/dL Final    Total Protein 02/28/2023 7.7  6.0 - 8.4 g/dL Final    Albumin 02/28/2023 4.4  3.5 - 5.2 g/dL Final    Total Bilirubin 02/28/2023 0.5  0.1 - 1.0 mg/dL Final    Alkaline Phosphatase 02/28/2023 61  55 - 135 U/L Final    AST 02/28/2023 24  10 - 40 U/L Final    ALT 02/28/2023 42  10 - 44 U/L Final    Anion Gap 02/28/2023 10  8 - 16 mmol/L Final    eGFR 02/28/2023 >60.0  >60 mL/min/1.73 m^2 Final    Cholesterol 02/28/2023 187  120 - 199 mg/dL Final    Triglycerides 02/28/2023 58  30 - 150 mg/dL Final    HDL 02/28/2023 56  40 - 75 mg/dL Final    LDL Cholesterol 02/28/2023 119.4  63.0 - 159.0 mg/dL Final    HDL/Cholesterol Ratio 02/28/2023 29.9  20.0 - 50.0 % Final    Total Cholesterol/HDL Ratio 02/28/2023 3.3  2.0 - 5.0 Final    Non-HDL Cholesterol 02/28/2023 131  mg/dL Final    TSH 02/28/2023 1.581  0.400 - 4.000 uIU/mL Final    Hemoglobin A1C 02/28/2023 5.5  4.0 - 5.6 % Final    Estimated Avg Glucose 02/28/2023 111  68 - 131 mg/dL Final        Mental Status Exam:   Appearance: casual   Oriented: x 3  Attitude: cooperative pleasant   Eye Contact: good    Behavior: calm here   Mood: generally ok tho thinks about move so not have to deal with flooding like Katherin   Cognition: alert /  Concentration: grossly intact   Affect: appropriate range   Anxiety: mild mod  Thought Process: goal directed   Speech: Volume : WNL   Quantity WNL   Quality: appears to openly answer questions   Eye Contact: good   Insight:  acknowledges some anxiety depression and interested in treatment    Threats: no SI / no HI   Memory: intact (as relay information across time spans)   Psychosis: denies all   Estimate of Intellectual Function: average   Impulse Control: no history SI / nor HI ; calm here      3 wishes ?  Financial stability / nothing else     Musculoskeletal: no tremor     Social: Living Situation / Supports / Activities / Substance      Patient Active Problem List   Diagnosis    Lateral epicondylitis (tennis elbow)    Chronic midline low back pain with bilateral sciatica    Misuse of prescription only drugs    Current moderate episode of major depressive disorder    Anxiety disorder    Dysfunction of eustachian tube    Depressive disorder    Recurrent major depressive disorder, in partial remission    Severe obesity (BMI 35.0-39.9) with comorbidity    Erectile dysfunction    Nicotine dependence, cigarettes, uncomplicated    Divorce / child custody issues still pending April 2024:  (2y old and 11 y old)    Major depressive disorder, recurrent episode, moderate degree    Cannabis use disorder in FULL REMISSION [did SMOKE CANNABIS 21y to 25 or 26y / DAILY 1/2 blunt]          Current Outpatient Medications:     buPROPion (WELLBUTRIN XL) 300 MG 24 hr tablet, Take 1 tablet (300 mg total) by mouth once daily., Disp: 30 tablet, Rfl: 3    EScitalopram oxalate (LEXAPRO) 10 MG tablet, Take 1 tablet (10 mg total) by mouth once daily., Disp: 30 tablet, Rfl: 3    multivitamin capsule, Take 1 capsule by mouth once daily., Disp: , Rfl:     tadalafiL (CIALIS) 20 MG Tab, Take 1 tablet (20 mg  "total) by mouth once daily., Disp: 30 tablet, Rfl: 11     Social History     Tobacco Use   Smoking Status Former    Types: Cigarettes   Smokeless Tobacco Never        Review of patient's allergies indicates:  No Known Allergies     ASSESSMENT:   Encounter Diagnoses   Name Primary?    Major depressive disorder, recurrent episode, moderate degree Yes    Anxiety disorder, unspecified type     Divorce / child custody issues still pending April 2024:  (2y old and 11 y old)     Nicotine dependence, cigarettes, uncomplicated     Cannabis use disorder in FULL REMISSION [did SMOKE CANNABIS 21y to 25 or 26y / DAILY 1/2 blunt]        Patient Instructions     PLAN:     Sarika SIMPSON flowed request to scheduling for Follow Up Oct 22 2024 @ 2 pm IN CLINIC THO PATIENT  NEEDS TO CONFIRM APPOINTMENT  by seeing such appointment  appear on their "My Chart" isidro.      NOTE:  IF  appointment is not visible, THEN patient is  instructed to call [facility] ([facility]) at:  [phone]  and coordinate appointment scheduling with . Understanding Expressed.      Info on Counselors:    1)  [facility]idenced[facility]Based Psych[facility] program, [facility] Program: ([phone])   and ask to set up an intake eval    Website: ([email or web address]   (Copy and paste to your browser)     Current Conditions Treated:  PTSD  Insomnia  Panic Attacks  Depression  Anxiety  Chronic Pain  Stress    The [facility] Clinic is ideal for patients who:  Desire short-term rather than long-term psychotherapy  Want structured psychotherapy sessions for specific targets  Are willing to engage in daily practice assignments  Can commit to weekly psychotherapy for a time-limited period (usually between 6-12 weeks)    Note: Possibility of virtual  (telehealth) participation may exist    call for more information ([phone])     2) Individual Counseling:     A) Call [facility]              " "[phone] (there may be significant wait times)     B) Contact   your  insurance carrier:  via their Website or call and ask for assistance    C) Psychology Today: www.[email or web address].ValetAnywhere/us/therapists     D) [facility] ([facility]) :   [phone] [email or web address]      (website shows accepts "walk ins"; "perhaps double check"    E)  [facility]:  [email or web address]://[email or web address]               [phone]    F) [facility]: Contact [facility] :   [phone] [email or web address]      Psyc Meds:   Wellbutrin XL advanced to 300 mg as mutually agreed over 30 x 3  Lexapro 10 mg once daily #30x 3  Risks benefits as discussed   Read package insert of all medication  for further detail / ask psych MD if any questions    References:     Relaxation stress reduction workbook: ROULA Ruiz PhD ( used: $7-10)    Feeling Good Website: Eduardo Vera MD / www.[email or web address] website (free) / vinny. PODCASTS    Anxiety &  phobia workbook by NOEMI Vega PhD  (web retailers: used: $ 7-10)    VA: Path to Better Sleep : https://www.veterantraining.va.gov/insomnia/ (free)     QUIT WITH [facility] : [email or web address]://[email or web address]/prepare/     [facility]ess[facility] [phone]: (www.[email or web address].[email or web address]/StopSmoking ; Quit smoking the Easy Way by Luis Klesey as well as some motivational concepts of alternative stresss mangement / relaxation skills and peer support      Pt expressed appreciation for the visit today and did not have further question at this time though pt  was still informed to:     Call  if problems.    Call / Report Side Effects to Psyc MD     Encouraged to follow up with primary care / Gen Med MD for continued monitoring of general health and wellness.    Understanding was expressed; and no further concerns nor questions were raised at this time.     Remember healthy self care:   eat right  attempt adequate rest   HANDWASHING / encourage such vinny. During this " corona virus time   walk or light exercise within reason and as your general med team approves  read or explore any of reference materials / homework mentioned  reach out (I.e.,  connect with)  others who nuture and bring out best in you  avoid risky behaviors    Keep your appointments:    IF you  cannot make your appt THEN please call [phone]  or go online (via My Chart isidro) to reschedule.    It is the responsibility of the patient to reschedule an appointment if an appointment has been canceled or missed.    Avoid  alcohol and illicit substances.  Look for the positive.  All is often relative-seek balance  Call sooner if needed : [phone]  Call 911 or go to Emergency Room  (ER)  if  any acute concerns       >> Excerpt from psyc alex <<      Varinder Clark Salazar a 38 y.o.  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     College grad and masters degree   Has a background in business administration and finance    He is pleasant goal-directed calm; says he is between jobs.    Has some job opportunities he is looking at    Mom was especially ; dad oversaw [employer] [facility] [employer] [facility]    He has had 1 psych hospitalization past; around the time of some marital stress.    He has 2 children (2 years old and 11 years old).  He has in a custody coppola with ex-wife.  Wife only wants to give him supervised visitation.  Says she always pains others in the worst anxiety has an   Excerpt of  [facility] note  from 8-8-23 :  Pt returned in person casually dressed and neatly groomed.  He reported that his mood has improved noticeably since restarting Lexapro 10 mg daily.  He is less anxious and upset over life events, without returning to the amotivational state which he experienced before at 20 mg.  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  He has regular custody of his children and he is dating again.  Unfortunately in  [place], the domestic violence charge which he says is false can never be expunged.     Medication was reviewed.  Pt reported he still has some sadness and decreased energy.  He is also experiencing sexual dysfunction on Lexapro.  Changing timing of the the dose has not helped.  He benefited from Wellbutrin in addition to Lexapro in the past and would like to try that again.  Seizure risk was discussed again and 150 mg of XR was prescribed daily.  He was asked to return in person in 3 months.  My care home in 3 months was explained.  He agreed to schedule now f/u with a prescribing provider in this clinic in 4 months.     >>  Past Psychiatric History:   Previous therapy: Yes and in past; helpful therapist left  Previous psychiatric hospitalizations: Yes and Lexy  job marital stress   Previous diagnoses:  depression and anxiety  Previous suicide attempts: No     Abuse History:   Physical Abuse: No  Sexual Abuse: No  Other Abuse / Trauma : No     Substance Abuse History:  Use of alcohol:  social occasion 1 day week/ no control issue  Tobacco use:  1 pack last 4 day to 1 week / started 22y   Cannabis:  gummies occasion at night // did SMOKE CANNABIS 21y to 25 or 26y / DAILY 1/2 blunt  Recreational drugs:  denies     Family History Mental Health:   Suicide :  No  Other:   mom depression / aunt bipolar       Weapons: No     Psyc Meds:   Lexapro 10mg (says helps tho some sexual side effect tho Cialis has resolved)   Wellbutrin  interest to go 300 mg     Top 3 Stressors:  child custody issues / legal issues / ex wife wanting supervised visits //  kids 11y and 2 y // financial stability //   [place] Born:  [place]     Siblings (full)  Brothers: one 6y older Edson / patent officer // lives [facility] // has girl boy  Sisters: one 10y older  [employer] benefit admin [employer] // [facility] she 2 girls   Pt is youngest      Parents : dad  complications covid 76y  // mom alive 73  Dorie      Briefly  "Describe  your Mom: supportive /  present   Briefly Describe your Dad: supportive present / "geat relationship with parents     Bio Mom: Occupation: spec  [employer] / [place] [employer]   Bio Dad:  Occupation:  over [employer] (keyboard [employer] violin )     Marital Status: once x 10 yrs / [place] 38y ()      Children   Girls  (ages): none  Boys (ages):  2y old: Navneet // 11y Shiraz      Education: [employer] /  [employer] bachelor:  [employer] admin and finance (2012)      Scientologist: Mandaen      Legal Issues? Custody kids   DWI ?n     Ever homeless? 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 4-5 yrs   [place] [employer] [place]       "

## 2024-07-03 NOTE — PATIENT INSTRUCTIONS
"  PLAN:     Sarika SIMPSON flowed request to scheduling for Follow Up Oct 22 2024 @ 2 pm IN CLINIC THO PATIENT  NEEDS TO CONFIRM APPOINTMENT  by seeing such appointment  appear on their "My Chart" isidro.      NOTE:  IF  appointment is not visible, THEN patient is  instructed to call [facility] ([facility]) at:  [phone]  and coordinate appointment scheduling with . Understanding Expressed.      Info on Counselors:    1)  [facility]enced[facility] program, [facility] Program: ([phone])   and ask to set up an intake eval    Website: ([email or web address]   (Copy and paste to your browser)     Current Conditions Treated:  PTSD  Insomnia  Panic Attacks  Depression  Anxiety  Chronic Pain  Stress    The [facility] Clinic is ideal for patients who:  Desire short-term rather than long-term psychotherapy  Want structured psychotherapy sessions for specific targets  Are willing to engage in daily practice assignments  Can commit to weekly psychotherapy for a time-limited period (usually between 6-12 weeks)    Note: Possibility of virtual  (telehealth) participation may exist    call for more information ([phone])     2) Individual Counseling:     A) Call [facility]              [phone] (there may be significant wait times)     B) Contact   your  insurance carrier:  via their Website or call and ask for assistance    C) Psychology Today: www.psychologytoday.com/us/therapists     D) [facility] ([facility] [place]) :   [phone] https://www.[email or web address]      (website shows accepts "walk ins"; "perhaps double check"    E)  [facility]:  [email or web address]://[email or web address]               [phone]    F) [facility]: Contact [facility] :   [phone] [email or web address]      Sarika Meds:   Wellbutrin XL advanced to 300 mg as mutually agreed over 30 x 3  Lexapro 10 mg once " daily #30x 3  Risks benefits as discussed   Read package insert of all medication  for further detail / ask psych MD if any questions    References:     Relaxation stress reduction workbook: ROULA Ruiz PhD ( used: $7-10)    Feeling Good Website: Eduardo Vera MD / www.feelingLake Communications.com website (free) / vinny. PODCASTS    Anxiety &  phobia workbook by NOEMI Vega PhD  (web retailers: used: $ 7-10)    VA: Path to Better Sleep : https://www.veterantraining.va.gov/insomnia/ (free)     QUIT WITH [facility] : https://[email or web address]/prepare/     [facility] [phone]: (www.[email or web address].org/StopSmoking ; Quit smoking the Easy Way by Luis Kelsey as well as some motivational concepts of alternative stresss mangement / relaxation skills and peer support      Pt expressed appreciation for the visit today and did not have further question at this time though pt  was still informed to:     Call  if problems.    Call / Report Side Effects to Psyc MD     Encouraged to follow up with primary care / Gen Med MD for continued monitoring of general health and wellness.    Understanding was expressed; and no further concerns nor questions were raised at this time.     Remember healthy self care:   eat right  attempt adequate rest   HANDWASHING / encourage such vinyn. During this corona virus time   walk or light exercise within reason and as your general med team approves  read or explore any of reference materials / homework mentioned  reach out (I.e.,  connect with)  others who nuture and bring out best in you  avoid risky behaviors    Keep your appointments:    IF you  cannot make your appt THEN please call [phone]  or go online (via My Chart isidro) to reschedule.    It is the responsibility of the patient to reschedule an appointment if an appointment has been canceled or missed.    Avoid  alcohol and illicit substances.  Look for the positive.  All is often relative-seek balance  Call sooner if needed :  [phone]  Call 911 or go to Emergency Room  (ER)  if  any acute concerns

## 2024-10-22 ENCOUNTER — OFFICE VISIT (OUTPATIENT)
Dept: PSYCHIATRY | Facility: CLINIC | Age: 39
End: 2024-10-22

## 2024-10-22 VITALS
WEIGHT: 218.69 LBS | SYSTOLIC BLOOD PRESSURE: 125 MMHG | HEART RATE: 101 BPM | DIASTOLIC BLOOD PRESSURE: 84 MMHG | BODY MASS INDEX: 34.25 KG/M2

## 2024-10-22 DIAGNOSIS — F41.9 ANXIETY DISORDER, UNSPECIFIED TYPE: ICD-10-CM

## 2024-10-22 DIAGNOSIS — F17.210 NICOTINE DEPENDENCE, CIGARETTES, UNCOMPLICATED: ICD-10-CM

## 2024-10-22 DIAGNOSIS — F33.1 MAJOR DEPRESSIVE DISORDER, RECURRENT EPISODE, MODERATE DEGREE: Primary | ICD-10-CM

## 2024-10-22 PROCEDURE — 99999 PR PBB SHADOW E&M-EST. PATIENT-LVL II: CPT | Mod: PBBFAC,,, | Performed by: PSYCHIATRY & NEUROLOGY

## 2024-10-22 PROCEDURE — 99214 OFFICE O/P EST MOD 30 MIN: CPT | Mod: S$PBB,,, | Performed by: PSYCHIATRY & NEUROLOGY

## 2024-10-22 PROCEDURE — 99212 OFFICE O/P EST SF 10 MIN: CPT | Mod: PBBFAC | Performed by: PSYCHIATRY & NEUROLOGY

## 2024-10-22 RX ORDER — ESCITALOPRAM OXALATE 10 MG/1
10 TABLET ORAL DAILY
Qty: 30 TABLET | Refills: 3 | Status: SHIPPED | OUTPATIENT
Start: 2024-10-22 | End: 2025-02-19

## 2024-10-22 RX ORDER — BUPROPION HYDROCHLORIDE 300 MG/1
300 TABLET ORAL DAILY
Qty: 30 TABLET | Refills: 3 | Status: SHIPPED | OUTPATIENT
Start: 2024-10-22 | End: 2025-02-19

## 2024-10-22 NOTE — PATIENT INSTRUCTIONS
"  PLAN:     Sarika SIMPSON flowed request to scheduling for Follow UP Feb 5 2025 @ 1 pm TELEHEALTH    2) If intersted in  Individual Counseling:     A) Call [facility]              [phone] (there may be significant wait times)     B) Contact   your  insurance carrier:  via their Website or call and ask for assistance    C) Psychology Today: www.psychologyVelostackday.ICRTec/us/therapists     D) [facility] ([facility]) :   [phone] [email or web address]      (website shows accepts "walk ins"; "perhaps double check"    E)  [facility]:  [email or web address]               [phone]    F) [facility]: Contact [facility] :   [phone] [email or web address]      Psyc Meds:   Wellbutrin  mg as mutually agreed over 30 x 3  Lexapro 10 mg once daily #30x 3  Risks benefits as discussed   Read package insert of all medication  for further detail / ask psych MD if any questions    References:     Relaxation stress reduction workbook: ROULA Ruiz PhD ( used: $7-10)    Feeling Good Website: Eduardo Vera MD / www.[email or web address].[email or web address] website (free) / vinny. PODCASTS    Anxiety &  phobia workbook by NOEMI Vega PhD  (web retailers: used: $ 7-10)    [place]: Path to Better Sleep : https://www.veterantraining.va.gov/insomnia/ (free)     QUIT WITH [facility] : https://[email or web address]/prepare/     [facility] [phone]: ([facility].org/StopSmoking ; Quit smoking the Easy Way by Luis Kelsey as well as some motivational concepts of alternative stresss mangement / relaxation skills and peer support      Pt expressed appreciation for the visit today and did not have further question at this time though pt  was still informed to:     Call  if problems.    Call / Report Side Effects to Sarika SIMPSON     Encouraged to follow up with primary care / Gen Med MD for continued monitoring of general health and wellness.    Understanding was expressed; and no " further concerns nor questions were raised at this time.     Remember healthy self care:   eat right  attempt adequate rest   HANDWASHING / encourage such vinny. During this corona virus time   walk or light exercise within reason and as your general med team approves  read or explore any of reference materials / homework mentioned  reach out (I.e.,  connect with)  others who nuture and bring out best in you  avoid risky behaviors    Keep your appointments:    IF you  cannot make your appt THEN please call [phone]  or go online (via My Chart isidro) to reschedule.    It is the responsibility of the patient to reschedule an appointment if an appointment has been canceled or missed.    Avoid  alcohol and illicit substances.  Look for the positive.  All is often relative-seek balance  Call sooner if needed : [phone]  Call [phone] or go to Emergency Room  (ER)  if  any acute concerns

## 2024-10-22 NOTE — PROGRESS NOTES
Varinder Salazar   1985     10/22/2024     Disclaimer: Evaluation and treatment is based on information presented to date. Any new information may affect assessment and findings.     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)     Oct 22 2024 >> Says Lost job couple weeks ago /  for prop m[employer] / [employer] /  [place] / says he was last join / first to go // handling ok      The patient location is: In Clinic     S: Patient's Own Perception of Condition (& Side Effects) :  none     O:      CURRENT PRESENTATION:     Says doing well notes: Got  late summer 2024; juan vega  / had been  once prior  x 10 yrs to [place] 38y ()  2 boys :  2y old: Navneet // 11y Shiraz     Says keeping busy at Work and daily life    Custody issue with kids remains same tho may be updated this summer    Goal directed    Calm    No SI  no HI    No Psychosis    Mood:  ok     Affect:   appro range    Self Ratings:       10/22/2024     2:29 PM 7/3/2024     3:23 PM 4/23/2024     7:18 PM   GAD7   1. Feeling nervous, anxious, or on edge? 2 1 2   2. Not being able to stop or control worrying? 2 2 2   3. Worrying too much about different things? 2 1 2   4. Trouble relaxing? 2 1 2   5. Being so restless that it is hard to sit still? 2 1 3   6. Becoming easily annoyed or irritable? 1 1 0   7. Feeling afraid as if something awful might happen? 1 1 2   8. If you checked off any problems, how difficult have these problems made it for you to do your work, take care of things at home, or get along with other people? 1 1 1   BECKY-7 Score 12 8 13          10/22/2024     2:29 PM 7/3/2024     3:25 PM 4/23/2024     7:18 PM 6/1/2021     9:39 AM 12/13/2017    10:59 AM   Depression Patient Health Questionnaire   Over the last two weeks how often have you been bothered by little interest or pleasure in doing things Several days Not at all More than half  the days Not at all Not at all   Over the last two weeks how often have you been bothered by feeling down, depressed or hopeless More than half the days Not at all More than half the days Not at all Not at all   PHQ-2 Total Score 3 0 4 0 0   Over the last two weeks how often have you been bothered by trouble falling or staying asleep, or sleeping too much Nearly every day More than half the days Nearly every day     Over the last two weeks how often have you been bothered by feeling tired or having little energy More than half the days Several days More than half the days     Over the last two weeks how often have you been bothered by a poor appetite or overeating Several days Not at all Several days     Over the last two weeks how often have you been bothered by feeling bad about yourself - or that you are a failure or have let yourself or your family down More than half the days Not at all More than half the days     Over the last two weeks how often have you been bothered by trouble concentrating on things, such as reading the newspaper or watching television More than half the days More than half the days More than half the days     Over the last two weeks how often have you been bothered by moving or speaking so slowly that other people could have noticed. Or the opposite - being so fidgety or restless that you have been moving around a lot more than usual. Several days Several days Several days     Over the last two weeks how often have you been bothered by thoughts that you would be better off dead, or of hurting yourself Not at all Not at all Not at all     If you checked off any problems, how difficult have these problems made it for you to do your work, take care of things at home or get along with other people? Somewhat difficult Somewhat difficult Somewhat difficult     PHQ-9 Score 14 6 15     PHQ-9 Interpretation Moderate Mild Moderately Severe       Psyc Medication:  likes such no side effects no med  issues    Wellbutrin  mg daily  Lexapro 10 mg daily    Constitutional Health Concerns:      Patient Active Problem List   Diagnosis    Lateral epicondylitis (tennis elbow)    Chronic midline low back pain with bilateral sciatica    Misuse of prescription only drugs    Current moderate episode of major depressive disorder    Anxiety disorder    Dysfunction of eustachian tube    Depressive disorder    Recurrent major depressive disorder, in partial remission    Severe obesity (BMI 35.0-39.9) with comorbidity    Erectile dysfunction    Nicotine dependence, cigarettes, uncomplicated    Divorce / child custody issues still pending April 2024:  (2y old and 11 y old)    Major depressive disorder, recurrent episode, moderate degree    Cannabis use disorder in FULL REMISSION [did SMOKE CANNABIS 21y to 25 or 26y / DAILY 1/2 blunt]        Wt Readings from Last 3 Encounters:   10/22/24 99.2 kg (218 lb 11.1 oz)   04/23/24 93.5 kg (206 lb 2.1 oz)   09/28/23 91 kg (200 lb 9.9 oz)        Laboratory Data  No visits with results within 1 Month(s) from this visit.   Latest known visit with results is:   Lab Visit on 02/28/2023   Component Date Value Ref Range Status    WBC 02/28/2023 10.90  3.90 - 12.70 K/uL Final    RBC 02/28/2023 5.14  4.60 - 6.20 M/uL Final    Hemoglobin 02/28/2023 14.9  14.0 - 18.0 g/dL Final    Hematocrit 02/28/2023 46.1  40.0 - 54.0 % Final    MCV 02/28/2023 90  82 - 98 fL Final    MCH 02/28/2023 29.0  27.0 - 31.0 pg Final    MCHC 02/28/2023 32.3  32.0 - 36.0 g/dL Final    RDW 02/28/2023 13.5  11.5 - 14.5 % Final    Platelets 02/28/2023 334  150 - 450 K/uL Final    MPV 02/28/2023 10.4  9.2 - 12.9 fL Final    Sodium 02/28/2023 141  136 - 145 mmol/L Final    Potassium 02/28/2023 4.0  3.5 - 5.1 mmol/L Final    Chloride 02/28/2023 105  95 - 110 mmol/L Final    CO2 02/28/2023 26  23 - 29 mmol/L Final    Glucose 02/28/2023 81  70 - 110 mg/dL Final    BUN 02/28/2023 10  6 - 20 mg/dL Final    Creatinine 02/28/2023  1.1  0.5 - 1.4 mg/dL Final    Calcium 02/28/2023 9.7  8.7 - 10.5 mg/dL Final    Total Protein 02/28/2023 7.7  6.0 - 8.4 g/dL Final    Albumin 02/28/2023 4.4  3.5 - 5.2 g/dL Final    Total Bilirubin 02/28/2023 0.5  0.1 - 1.0 mg/dL Final    Alkaline Phosphatase 02/28/2023 61  55 - 135 U/L Final    AST 02/28/2023 24  10 - 40 U/L Final    ALT 02/28/2023 42  10 - 44 U/L Final    Anion Gap 02/28/2023 10  8 - 16 mmol/L Final    eGFR 02/28/2023 >60.0  >60 mL/min/1.73 m^2 Final    Cholesterol 02/28/2023 187  120 - 199 mg/dL Final    Triglycerides 02/28/2023 58  30 - 150 mg/dL Final    HDL 02/28/2023 56  40 - 75 mg/dL Final    LDL Cholesterol 02/28/2023 119.4  63.0 - 159.0 mg/dL Final    HDL/Cholesterol Ratio 02/28/2023 29.9  20.0 - 50.0 % Final    Total Cholesterol/HDL Ratio 02/28/2023 3.3  2.0 - 5.0 Final    Non-HDL Cholesterol 02/28/2023 131  mg/dL Final    TSH 02/28/2023 1.581  0.400 - 4.000 uIU/mL Final    Hemoglobin A1C 02/28/2023 5.5  4.0 - 5.6 % Final    Estimated Avg Glucose 02/28/2023 111  68 - 131 mg/dL Final        Mental Status Exam:   Appearance: casual   Oriented: x 3  Attitude: cooperative pleasant   Eye Contact: good   Behavior: calm here   Mood: ok   Cognition: alert /  Concentration: grossly intact   Affect: appropriate range   Anxiety: mild mod  Thought Process: goal directed   Speech: Volume : WNL   Quantity WNL   Quality: appears to openly answer questions   Eye Contact: good   Threats: no SI / no HI   Memory: intact (as relay information across time spans)   Psychosis: denies all   Impulse Control: no history SI / nor HI ; calm here      3 wishes ?  Financial stability / nothing else     Musculoskeletal: no tremor     Social:  Recently  summer 2024 lady he has known since 7th grade      Patient Active Problem List   Diagnosis    Lateral epicondylitis (tennis elbow)    Chronic midline low back pain with bilateral sciatica    Misuse of prescription only drugs    Current moderate episode of major  depressive disorder    Anxiety disorder    Dysfunction of eustachian tube    Depressive disorder    Recurrent major depressive disorder, in partial remission    Severe obesity (BMI 35.0-39.9) with comorbidity    Erectile dysfunction    Nicotine dependence, cigarettes, uncomplicated    Divorce / child custody issues still pending April 2024:  (2y old and 11 y old)    Major depressive disorder, recurrent episode, moderate degree    Cannabis use disorder in FULL REMISSION [did SMOKE CANNABIS 21y to 25 or 26y / DAILY 1/2 blunt]          Current Outpatient Medications:     buPROPion (WELLBUTRIN XL) 300 MG 24 hr tablet, Take 1 tablet (300 mg total) by mouth once daily., Disp: 30 tablet, Rfl: 3    EScitalopram oxalate (LEXAPRO) 10 MG tablet, Take 1 tablet (10 mg total) by mouth once daily., Disp: 30 tablet, Rfl: 3    multivitamin capsule, Take 1 capsule by mouth once daily., Disp: , Rfl:     tadalafiL (CIALIS) 20 MG Tab, Take 1 tablet (20 mg total) by mouth once daily., Disp: 30 tablet, Rfl: 11     Social History     Tobacco Use   Smoking Status Former    Types: Cigarettes   Smokeless Tobacco Never        Review of patient's allergies indicates:  No Known Allergies     ASSESSMENT:   Encounter Diagnoses   Name Primary?    Major depressive disorder, recurrent episode, mild (historically: moderate ) Yes    Nicotine dependence, cigarettes, uncomplicated     Anxiety disorder, unspecified type          Patient Instructions     PLAN:     Sarika SIMPSON flowed request to scheduling for Follow UP Feb 5 2025 @ 1 pm TELEHEALTH    2) If intersted in  Individual Counseling:     A) Call [facility]              [phone] (there may be significant wait times)     B) Contact   your  insurance carrier:  via their Website or call and ask for assistance    C) Psychology Today: www.psychologytoday.com/us/therapists     D) [facility] :   [phone] [email or web address]      (website shows  "accepts "walk ins"; "perhaps double check"    E)  [facility]:  [email or web address]               [phone]    F) [facility]: Contact [facility] :   [phone] [email or web address]      Psyc Meds:   Wellbutrin  mg as mutually agreed over 30 x 3  Lexapro 10 mg once daily #30x 3  Risks benefits as discussed   Read package insert of all medication  for further detail / ask psych MD if any questions    References:     Relaxation stress reduction workbook: ROULA Ruiz PhD ( used: $7-10)    Feeling Good Website: Eduardo Vera MD / www.[employer] website (free) / vinny. PODCASTS    Anxiety &  phobia workbook by NOEMI Vega PhD  (web retailers: used: $ 7-10)    VA: Path to Better Sleep : https://www.veterantraining.va.gov/insomnia/ (free)     QUIT WITH [facility] : https://quitwithusla.org/prepare/     [facility] [phone]: (www.[email or web address].org/StopSmoking ; Quit smoking the Easy Way by Luis Kelsey as well as some motivational concepts of alternative stresss mangement / relaxation skills and peer support      Pt expressed appreciation for the visit today and did not have further question at this time though pt  was still informed to:     Call  if problems.    Call / Report Side Effects to Psyc MD     Encouraged to follow up with primary care / Gen Med MD for continued monitoring of general health and wellness.    Understanding was expressed; and no further concerns nor questions were raised at this time.     Remember healthy self care:   eat right  attempt adequate rest   HANDWASHING / encourage such vinny. During this corona virus time   walk or light exercise within reason and as your general med team approves  read or explore any of reference materials / homework mentioned  reach out (I.e.,  connect with)  others who nuture and bring out best in you  avoid risky behaviors    Keep your appointments:    IF you  cannot make your appt THEN please call " [phone]  or go online (via My Chart isidro) to reschedule.    It is the responsibility of the patient to reschedule an appointment if an appointment has been canceled or missed.    Avoid  alcohol and illicit substances.  Look for the positive.  All is often relative-seek balance  Call sooner if needed : [phone]  Call 911 or go to Emergency Room  (ER)  if  any acute concerns       >> Excerpt from psyc alex <<      Varinder Clark Salazar a 38 y.o.  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     College grad and masters degree   Has a background in business administration and finance    He is pleasant goal-directed calm; says he is between jobs.    Has some job opportunities he is looking at    Mom was especially ; dad oversaw [employer] [facility]    He has had 1 psych hospitalization past; around the time of some marital stress.    He has 2 children (2 years old and 11 years old).  He has in a custody coppola with ex-wife.  Wife only wants to give him supervised visitation.  Says she always pains others in the worst anxiety has an   Excerpt of  [facility] note  from 8-8-23 :  Pt returned in person casually dressed and neatly groomed.  He reported that his mood has improved noticeably since restarting Lexapro 10 mg daily.  He is less anxious and upset over life events, without returning to the amotivational state which he experienced before at 20 mg.  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  He has regular custody of his children and he is dating again.  Unfortunately in [facility], the domestic violence charge which he says is false can never be expunged.     Medication was reviewed.  Pt reported he still has some sadness and decreased energy.  He is also experiencing sexual dysfunction on Lexapro.  Changing timing of the the dose has not helped.  He benefited from Wellbutrin in addition to Lexapro in the past  "and would like to try that again.  Seizure risk was discussed again and 150 mg of XR was prescribed daily.  He was asked to return in person in 3 months.  My half-way in 3 months was explained.  He agreed to schedule now f/u with a prescribing provider in this clinic in 4 months.     >>  Past Psychiatric History:   Previous therapy: Yes and in past; helpful therapist left  Previous psychiatric hospitalizations: Yes and Lexy  job marital stress   Previous diagnoses:  depression and anxiety  Previous suicide attempts: No     Abuse History:   Physical Abuse: No  Sexual Abuse: No  Other Abuse / Trauma : No     Substance Abuse History:  Use of alcohol:  social occasion 1 day week/ no control issue  Tobacco use:  1 pack last 4 day to 1 week / started 22y   Cannabis:  gummies occasion at night // did SMOKE CANNABIS 21y to 25 or 26y / DAILY 1/2 blunt  Recreational drugs:  denies     Family History Mental Health:   Suicide :  No  Other:   mom depression / aunt bipolar       Weapons: No     Psyc Meds:   Lexapro 10mg (says helps tho some sexual side effect tho Cialis has resolved)   Wellbutrin  interest to go 300 mg     Top 3 Stressors:  child custody issues / legal issues / ex wife wanting supervised visits //  kids 11y and 2 y // financial stability //   City Born:  [place]     Siblings (full)  Brothers: one 6y older Edson / patent officer // lives [facility] // has girl boy  Sisters: one 10y older  [employer] benefit admin [employer] // [facility] she 2 girls   Pt is youngest      Parents : dad  complications covid 76y  // mom alive 73  Dorie      Briefly Describe  your Mom: supportive /  present   Briefly Describe your Dad: supportive present / "geat relationship with parents     Bio Mom: Occupation: spec  [employer] / [employer]   Bio Dad:  Occupation:  over [employer] (keyboard clarinet violin )     Marital Status: once x 10 yrs / [place] 38y (2nd grade " teacher)      Children   Girls  (ages): none  Boys (ages):  2y old: Navneet // 11y Shiraz      Education: [employer] W[employer] /  [employer] bachelor:  [employer] admin and finance (2012)      Mu-ism: Mu-ism      Legal Issues? Custody kids   DWI ?n     Ever homeless? 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 4-5 yrs   [employer] [place]

## 2025-02-05 ENCOUNTER — OFFICE VISIT (OUTPATIENT)
Dept: PSYCHIATRY | Facility: CLINIC | Age: 40
End: 2025-02-05

## 2025-02-05 DIAGNOSIS — F17.210 NICOTINE DEPENDENCE, CIGARETTES, UNCOMPLICATED: ICD-10-CM

## 2025-02-05 DIAGNOSIS — F41.9 ANXIETY DISORDER, UNSPECIFIED TYPE: ICD-10-CM

## 2025-02-05 DIAGNOSIS — F33.1 MAJOR DEPRESSIVE DISORDER, RECURRENT EPISODE, MODERATE DEGREE: ICD-10-CM

## 2025-02-05 DIAGNOSIS — G47.00 INSOMNIA, UNSPECIFIED TYPE: Primary | ICD-10-CM

## 2025-02-05 PROCEDURE — 98005 SYNCH AUDIO-VIDEO EST LOW 20: CPT | Mod: 95,,, | Performed by: PSYCHIATRY & NEUROLOGY

## 2025-02-05 PROCEDURE — 90833 PSYTX W PT W E/M 30 MIN: CPT | Mod: 95,,, | Performed by: PSYCHIATRY & NEUROLOGY

## 2025-02-05 RX ORDER — ESCITALOPRAM OXALATE 10 MG/1
10 TABLET ORAL DAILY
Qty: 30 TABLET | Refills: 4 | Status: SHIPPED | OUTPATIENT
Start: 2025-02-05 | End: 2025-07-05

## 2025-02-05 RX ORDER — BUPROPION HYDROCHLORIDE 300 MG/1
300 TABLET ORAL DAILY
Qty: 30 TABLET | Refills: 4 | Status: SHIPPED | OUTPATIENT
Start: 2025-02-05 | End: 2025-07-05

## 2025-02-05 RX ORDER — HYDROXYZINE HYDROCHLORIDE 25 MG/1
25-50 TABLET, FILM COATED ORAL NIGHTLY PRN
Qty: 60 TABLET | Refills: 3 | Status: SHIPPED | OUTPATIENT
Start: 2025-02-05 | End: 2025-06-05

## 2025-02-05 NOTE — PROGRESS NOTES
aVrinder Salazar   1985     02/05/2025     Disclaimer: Evaluation and treatment is based on information presented to date. Any new information may affect assessment and findings.     He has WIL from [facility]  [facility] bachelor:  [employer] (2012)     Oct 22 2024 >> Lost job couple weeks ago /  for [employer] / [employer] /  [place] / says he was last join / first to go //  Feb 5 2025: Landed job from  [employer]    The patient location is: Patient's [facility]       Visit type: Virtual visit with synchronous audio and video     Each patient to whom he or she provides medical services by telemedicine is: (1) informed of the relationship between the physician and patient and the respective role of any other health care provider with respect to management of the patient; and (2) notified that he or she may decline to receive medical services by telemedicine and may withdraw from such care at any time.    Patient was informed that I am a physician who is licensed in the [facility]:  Eduardo Malik MD:  Employed by   [facility]     If technology issues arise: RAYNA Malik MD will attempt to call pt back     Pt informed that if he / she is ever in crisis (or has acute concerns): pt is instructed to Dial 911 or go to nearest Emergency Room (ER)    Pt informed that if questions related to privacy practices: pt is instructed to contact [facility] Information Department:     Understanding Expressed. No questions.     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 Feeling nervous, anxious, or on edge? 1 2 1   2. Not being able to stop or control worrying? 1 2 2   3. Worrying too much about different things? 1 2 1   4. Trouble relaxing? 1 2 1   5. Being so restless that it is hard to sit still? 1 2 1   6. Becoming easily annoyed or irritable? 0 1 1   7. Feeling afraid as if something awful might happen? 0 1 1   8. If you checked off any problems, how difficult have these problems made it for you to do your work, take care of things at home, or get along with other people? 1 1 1   BECKY-7 Score 5 12 8          2/5/2025     1:23 PM 10/22/2024     2:29 PM 7/3/2024     3:25 PM 4/23/2024     7:18 PM 6/1/2021     9:39 AM 12/13/2017    10:59 AM   Depression Patient Health Questionnaire   Over the last two weeks how often have you been bothered by little interest or pleasure in doing things More than half the days Several days Not at all More than half the days Not at all Not at all   Over the last two weeks how often have you been bothered by feeling down, depressed or hopeless Several days More than half the days Not at all More than half the days Not at all Not at all   PHQ-2 Total Score 3 3 0 4 0 0   Over the last two weeks how often have you been bothered by trouble falling or staying asleep, or sleeping too much More than half the days Nearly every day More than half the days Nearly every day     Over the last two weeks how often have you been bothered by feeling tired or having little energy More than half the days More than half the days Several days More than half the days     Over the last two weeks how often have you been bothered by a poor appetite or overeating Not at all Several days Not at all Several days      Over the last two weeks how often have you been bothered by feeling bad about yourself - or that you are a failure or have let yourself or your family down Several days More than half the days Not at all More than half the days     Over the last two weeks how often have you been bothered by trouble concentrating on things, such as reading the newspaper or watching television More than half the days More than half the days More than half the days More than half the days     Over the last two weeks how often have you been bothered by moving or speaking so slowly that other people could have noticed. Or the opposite - being so fidgety or restless that you have been moving around a lot more than usual. Several days Several days Several days Several days     Over the last two weeks how often have you been bothered by thoughts that you would be better off dead, or of hurting yourself Not at all Not at all Not at all Not at all     If you checked off any problems, how difficult have these problems made it for you to do your work, take care of things at home or get along with other people? Somewhat difficult Somewhat difficult Somewhat difficult Somewhat difficult     PHQ-9 Score 11 14 6 15     PHQ-9 Interpretation Moderate Moderate Mild Moderately Severe       Psyc Medication:  likes such no side effects no med issues    Wellbutrin  mg daily  Lexapro 10 mg daily    Constitutional Health Concerns:      Patient Active Problem List   Diagnosis    Lateral epicondylitis (tennis elbow)    Chronic midline low back pain with bilateral sciatica    Misuse of prescription only drugs    Current moderate episode of major depressive disorder    Anxiety disorder    Dysfunction of eustachian tube    Depressive disorder    Recurrent major depressive disorder, in partial remission    Severe obesity (BMI 35.0-39.9) with comorbidity    Erectile dysfunction    Nicotine dependence, cigarettes, uncomplicated    Divorce / child custody  issues still pending April 2024:  (2y old and 11 y old)    Major depressive disorder, recurrent episode, moderate degree    Cannabis use disorder in FULL REMISSION [did SMOKE CANNABIS 21y to 25 or 26y / DAILY 1/2 blunt]        Wt Readings from Last 3 Encounters:   10/22/24 99.2 kg (218 lb 11.1 oz)   04/23/24 93.5 kg (206 lb 2.1 oz)   09/28/23 91 kg (200 lb 9.9 oz)        Laboratory Data  No visits with results within 1 Month(s) from this visit.   Latest known visit with results is:   Lab Visit on 02/28/2023   Component Date Value Ref Range Status    WBC 02/28/2023 10.90  3.90 - 12.70 K/uL Final    RBC 02/28/2023 5.14  4.60 - 6.20 M/uL Final    Hemoglobin 02/28/2023 14.9  14.0 - 18.0 g/dL Final    Hematocrit 02/28/2023 46.1  40.0 - 54.0 % Final    MCV 02/28/2023 90  82 - 98 fL Final    MCH 02/28/2023 29.0  27.0 - 31.0 pg Final    MCHC 02/28/2023 32.3  32.0 - 36.0 g/dL Final    RDW 02/28/2023 13.5  11.5 - 14.5 % Final    Platelets 02/28/2023 334  150 - 450 K/uL Final    MPV 02/28/2023 10.4  9.2 - 12.9 fL Final    Sodium 02/28/2023 141  136 - 145 mmol/L Final    Potassium 02/28/2023 4.0  3.5 - 5.1 mmol/L Final    Chloride 02/28/2023 105  95 - 110 mmol/L Final    CO2 02/28/2023 26  23 - 29 mmol/L Final    Glucose 02/28/2023 81  70 - 110 mg/dL Final    BUN 02/28/2023 10  6 - 20 mg/dL Final    Creatinine 02/28/2023 1.1  0.5 - 1.4 mg/dL Final    Calcium 02/28/2023 9.7  8.7 - 10.5 mg/dL Final    Total Protein 02/28/2023 7.7  6.0 - 8.4 g/dL Final    Albumin 02/28/2023 4.4  3.5 - 5.2 g/dL Final    Total Bilirubin 02/28/2023 0.5  0.1 - 1.0 mg/dL Final    Alkaline Phosphatase 02/28/2023 61  55 - 135 U/L Final    AST 02/28/2023 24  10 - 40 U/L Final    ALT 02/28/2023 42  10 - 44 U/L Final    Anion Gap 02/28/2023 10  8 - 16 mmol/L Final    eGFR 02/28/2023 >60.0  >60 mL/min/1.73 m^2 Final    Cholesterol 02/28/2023 187  120 - 199 mg/dL Final    Triglycerides 02/28/2023 58  30 - 150 mg/dL Final    HDL 02/28/2023 56  40 - 75 mg/dL  Final    LDL Cholesterol 02/28/2023 119.4  63.0 - 159.0 mg/dL Final    HDL/Cholesterol Ratio 02/28/2023 29.9  20.0 - 50.0 % Final    Total Cholesterol/HDL Ratio 02/28/2023 3.3  2.0 - 5.0 Final    Non-HDL Cholesterol 02/28/2023 131  mg/dL Final    TSH 02/28/2023 1.581  0.400 - 4.000 uIU/mL Final    Hemoglobin A1C 02/28/2023 5.5  4.0 - 5.6 % Final    Estimated Avg Glucose 02/28/2023 111  68 - 131 mg/dL Final        Mental Status Exam:   Appearance: casual   Oriented: x 3  Attitude: cooperative pleasant   Eye Contact: good   Behavior: calm here   Mood: ok   Cognition: alert /  Concentration: grossly intact   Affect: appropriate range   Anxiety: mild mod  Thought Process: goal directed   Speech: Volume : WNL   Quantity WNL   Quality: appears to openly answer questions   Eye Contact: good   Threats: no SI / no HI   Memory: intact (as relay information across time spans)   Psychosis: denies all   Impulse Control: no history SI / nor HI ; calm here      3 wishes ?  Financial stability / nothing else     Musculoskeletal: no tremor     Social:  Recently  summer 2024 lady he has known since 7th grade      Patient Active Problem List   Diagnosis    Lateral epicondylitis (tennis elbow)    Chronic midline low back pain with bilateral sciatica    Misuse of prescription only drugs    Current moderate episode of major depressive disorder    Anxiety disorder    Dysfunction of eustachian tube    Depressive disorder    Recurrent major depressive disorder, in partial remission    Severe obesity (BMI 35.0-39.9) with comorbidity    Erectile dysfunction    Nicotine dependence, cigarettes, uncomplicated    Divorce / child custody issues still pending April 2024:  (2y old and 11 y old)    Major depressive disorder, recurrent episode, moderate degree    Cannabis use disorder in FULL REMISSION [did SMOKE CANNABIS 21y to 25 or 26y / DAILY 1/2 blunt]          Current Outpatient Medications:     buPROPion (WELLBUTRIN XL) 300 MG 24 hr  tablet, Take 1 tablet (300 mg total) by mouth once daily., Disp: 30 tablet, Rfl: 4    EScitalopram oxalate (LEXAPRO) 10 MG tablet, Take 1 tablet (10 mg total) by mouth once daily., Disp: 30 tablet, Rfl: 4    hydrOXYzine HCL (ATARAX) 25 MG tablet, Take 1-2 tablets (25-50 mg total) by mouth nightly as needed (INSOMNIA)., Disp: 60 tablet, Rfl: 3    multivitamin capsule, Take 1 capsule by mouth once daily., Disp: , Rfl:     tadalafiL (CIALIS) 20 MG Tab, Take 1 tablet (20 mg total) by mouth once daily., Disp: 30 tablet, Rfl: 11     Social History     Tobacco Use   Smoking Status Former    Types: Cigarettes   Smokeless Tobacco Never        Review of patient's allergies indicates:  No Known Allergies       Psychotherapy:  Target symptoms: depression, anxiety , new job, recently , insomnia   Why chosen therapy is appropriate versus another modality: relevant to diagnosis  Outcome monitoring methods: self-report, observation, checklist/rating scale  Therapeutic intervention type: insight oriented psychotherapy, supportive psychotherapy  Topics discussed/themes:  see Target symptoms  The patient's response to the intervention is accepting. The patient's progress toward treatment goals is good.   Duration of intervention: 16 minutes.     ASSESSMENT:   Encounter Diagnoses   Name Primary?    Major depressive disorder, recurrent episode, mild (historically: moderate )     Insomnia, unspecified type Yes    Nicotine dependence, cigarettes, uncomplicated     Anxiety disorder, unspecified type        Patient Instructions     PLAN:   Follow UP: May 1 2025 @  11am TELEHEALTH     2) If intersted in  Individual Counseling:     A) Call [facility]              [phone] (there may be significant wait times)     B) Contact   your  insurance carrier:  via their Website or call and ask for assistance    C) Psychology Today: www.psychologytoday.com/us/therapists     D) [facility] ([facility])  ":   [phone] [email or web address]      (website shows accepts "walk ins"; "perhaps double check"    E)  [facility]:  [email or web address]               [phone]    F) [facility]: Contact [facility] :   [phone] [email or web address]      Psyc Meds:   Wellbutrin  mg as mutually agreed over 30 x 3  Lexapro 10 mg once daily #30x 3  Hydroxyzine 25 mg 1-2 at bed as needed for insomnia  Risks benefits as discussed   Read package insert of all medication  for further detail / ask psych MD if any questions    References:     Relaxation stress reduction workbook: ROULA Ruiz PhD ( used: $7-10)    Feeling Good Website: Eduardo Vera MD / www.[employer] website (free) / vinny. PODCASTS    Anxiety &  phobia workbook by NOEMI Vega PhD  (web retailers: used: $ 7-10)    [place]: Path to Better Sleep : https://www.veterantraining.va.gov/insomnia/ (free)     QUIT WITH [facility] : https://quitwithusla.org/prepare/     [facility]ess[facility] [phone]: (www.[email or web address].org/StopSmoking ; Quit smoking the Easy Way by Luis Kelsey as well as some motivational concepts of alternative stresss mangement / relaxation skills and peer support      Pt expressed appreciation for the visit today and did not have further question at this time though pt  was still informed to:     Call  if problems.    Call / Report Side Effects to Psyc MD     Encouraged to follow up with primary care / Gen Med MD for continued monitoring of general health and wellness.    Understanding was expressed; and no further concerns nor questions were raised at this time.     Remember healthy self care:   eat right  attempt adequate rest   HANDWASHING / encourage such vinny. During this corona virus time   walk or light exercise within reason and as your general med team approves  read or explore any of reference materials / homework mentioned  reach out (I.e.,  connect with)  others who nuture and bring out " best in you  avoid risky behaviors    Keep your appointments:    IF you  cannot make your appt THEN please call [phone]  or go online (via My Chart isidro) to reschedule.    It is the responsibility of the patient to reschedule an appointment if an appointment has been canceled or missed.    Avoid  alcohol and illicit substances.  Look for the positive.  All is often relative-seek balance  Call sooner if needed : [phone]  Call 911 or go to Emergency Room  (ER)  if  any acute concerns       >> Excerpt from psyc alex <<      Varinder Clark Salazar a 38 y.o.  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     College grad and masters degree   Has a background in business administration and finance    He is pleasant goal-directed calm; says he is between jobs.    Has some job opportunities he is looking at    Mom was especially ; dad oversaw [employer] [facility]    He has had 1 psych hospitalization past; around the time of some marital stress.    He has 2 children (2 years old and 11 years old).  He has in a custody coppola with ex-wife.  Wife only wants to give him supervised visitation.  Says she always pains others in the worst anxiety has an   Excerpt of  [facility] note  from 8-8-23 :  Pt returned in person casually dressed and neatly groomed.  He reported that his mood has improved noticeably since restarting Lexapro 10 mg daily.  He is less anxious and upset over life events, without returning to the amotivational state which he experienced before at 20 mg.  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  He has regular custody of his children and he is dating again.  Unfortunately in [facility], the domestic violence charge which he says is false can never be expunged.     Medication was reviewed.  Pt reported he still has some sadness and decreased energy.  He is also experiencing sexual dysfunction on Lexapro.   "Changing timing of the the dose has not helped.  He benefited from Wellbutrin in addition to Lexapro in the past and would like to try that again.  Seizure risk was discussed again and 150 mg of XR was prescribed daily.  He was asked to return in person in 3 months.  My skilled nursing in 3 months was explained.  He agreed to schedule now f/u with a prescribing provider in this clinic in 4 months.     >>  Past Psychiatric History:   Previous therapy: Yes and in past; helpful therapist left  Previous psychiatric hospitalizations: Yes and [facility] job marital stress   Previous diagnoses:  depression and anxiety  Previous suicide attempts: No     Abuse History:   Physical Abuse: No  Sexual Abuse: No  Other Abuse / Trauma : No     Substance Abuse History:  Use of alcohol:  social occasion 1 day week/ no control issue  Tobacco use:  1 pack last 4 day to 1 week / started 22y   Cannabis:  gummies occasion at night // did SMOKE CANNABIS 21y to 25 or 26y / DAILY 1/2 blunt  Recreational drugs:  denies     Family History Mental Health:   Suicide :  No  Other:   mom depression / aunt bipolar       Weapons: No     Psyc Meds:   Lexapro 10mg (says helps tho some sexual side effect tho Cialis has resolved)   Wellbutrin  interest to go 300 mg     Top 3 Stressors:  child custody issues / legal issues / ex wife wanting supervised visits //  kids 11y and 2 y // financial stability //   City Born:  [place]     Siblings (full)  Brothers: one 6y older Edson / patent officer // lives [facility] // has girl boy  Sisters: one 10y older  [employer] benefit admin [facility] [employer] // [facility] she 2 girls   Pt is youngest      Parents : dad  complications covid 76y  // mom alive 73  Dorie      Briefly Describe  your Mom: supportive /  present   Briefly Describe your Dad: supportive present / "geat relationship with parents     Bio Mom: Occupation: spec  [employer] / [employer]   Bio Dad:  Occupation: music " supervisor over [employer] (keyboard [employer] )     Marital Status: once x 10 yrs / [place]rem[place] 38y ()      Children   Girls  (ages): none  Boys (ages):  2y old: Navneet // 11y Shiraz      Education: [employer] WIL /  [employer] bachelor:  [employer] admin and finance (2012)      Yarsani: Taoist      Legal Issues? Custody kids   DWI ?n     Ever homeless? 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 4-5 yrs   [employer] [place]

## 2025-02-05 NOTE — PATIENT INSTRUCTIONS
"  PLAN:   Follow UP: May 1 2025 @  11am TELEHEALTH     2) If intersted in  Individual Counseling:     A) Call [facility]              [phone] (there may be significant wait times)     B) Contact   your  insurance carrier:  via their Website or call and ask for assistance    C) Psychology Today: www.psychologyLucidux.[employer]/us/therapists     D) [facility] :   [phone] [email or web address]      (website shows accepts "walk ins"; "perhaps double check"    E)  [facility]:  [email or web address]               [phone]    F) [facility]: Contact [facility] :   [phone] [email or web address]      Psyc Meds:   Wellbutrin  mg as mutually agreed over 30 x 3  Lexapro 10 mg once daily #30x 3  Hydroxyzine 25 mg 1-2 at bed as needed for insomnia  Risks benefits as discussed   Read package insert of all medication  for further detail / ask psych MD if any questions    References:     Relaxation stress reduction workbook: ROULA Ruiz PhD ( used: $7-10)    Feeling Good Website: Eduardo Vera MD / www.[employer] website (free) / vinny. PODCASTS    Anxiety &  phobia workbook by NOEMI Vega PhD  (web retailers: used: $ 7-10)    [place]: Path to Better Sleep : https://www.veterantraining.va.gov/insomnia/ (free)     QUIT WITH [facility] : https://quitwithusla.org/prepare/     [facility] [phone]: (www.[facility].org/StopSmoking ; Quit smoking the Easy Way by Luis Kelsey as well as some motivational concepts of alternative stresss mangement / relaxation skills and peer support      Pt expressed appreciation for the visit today and did not have further question at this time though pt  was still informed to:     Call  if problems.    Call / Report Side Effects to Psyc MD     Encouraged to follow up with primary care / Gen Med MD for continued monitoring of general health and wellness.    Understanding was " expressed; and no further concerns nor questions were raised at this time.     Remember healthy self care:   eat right  attempt adequate rest   HANDWASHING / encourage such vinny. During this corona virus time   walk or light exercise within reason and as your general med team approves  read or explore any of reference materials / homework mentioned  reach out (I.e.,  connect with)  others who nuture and bring out best in you  avoid risky behaviors    Keep your appointments:    IF you  cannot make your appt THEN please call [phone]  or go online (via My Chart isidro) to reschedule.    It is the responsibility of the patient to reschedule an appointment if an appointment has been canceled or missed.    Avoid  alcohol and illicit substances.  Look for the positive.  All is often relative-seek balance  Call sooner if needed : [phone]  Call 431 or go to Emergency Room  (ER)  if  any acute concerns

## 2025-05-01 ENCOUNTER — OFFICE VISIT (OUTPATIENT)
Dept: PSYCHIATRY | Facility: CLINIC | Age: 40
End: 2025-05-01

## 2025-05-01 DIAGNOSIS — Z63.5 DIVORCE: ICD-10-CM

## 2025-05-01 DIAGNOSIS — F33.1 MAJOR DEPRESSIVE DISORDER, RECURRENT EPISODE, MODERATE DEGREE: Primary | ICD-10-CM

## 2025-05-01 DIAGNOSIS — F17.210 NICOTINE DEPENDENCE, CIGARETTES, UNCOMPLICATED: ICD-10-CM

## 2025-05-01 DIAGNOSIS — G47.00 INSOMNIA, UNSPECIFIED TYPE: ICD-10-CM

## 2025-05-01 DIAGNOSIS — F41.9 ANXIETY DISORDER, UNSPECIFIED TYPE: ICD-10-CM

## 2025-05-01 RX ORDER — HYDROXYZINE HYDROCHLORIDE 50 MG/1
50 TABLET, FILM COATED ORAL NIGHTLY PRN
Qty: 30 TABLET | Refills: 3 | Status: SHIPPED | OUTPATIENT
Start: 2025-05-01 | End: 2025-08-29

## 2025-05-01 RX ORDER — BUPROPION HYDROCHLORIDE 300 MG/1
300 TABLET ORAL DAILY
Qty: 30 TABLET | Refills: 3 | Status: SHIPPED | OUTPATIENT
Start: 2025-05-01 | End: 2025-08-29

## 2025-05-01 RX ORDER — ESCITALOPRAM OXALATE 10 MG/1
10 TABLET ORAL DAILY
Qty: 30 TABLET | Refills: 3 | Status: SHIPPED | OUTPATIENT
Start: 2025-05-01 | End: 2025-08-29

## 2025-05-01 SDOH — SOCIAL DETERMINANTS OF HEALTH (SDOH): DISRUPTION OF FAMILY BY SEPARATION AND DIVORCE: Z63.5

## 2025-05-01 NOTE — PATIENT INSTRUCTIONS
"  PLAN:     Follow UP Aug 21 2025 @ 3:30pm TELEHEALTH     2) If intersted in  Individual Counseling:     A) Call [facility]              [phone] (there may be significant wait times)     B) Contact   your  insurance carrier:  via their Website or call and ask for assistance    C) Psychology Today: www.psychologyCrowderyday.[employer]/us/therapists     D) [facility] ([facility]) :   [phone] [email or web address]      (website shows accepts "walk ins"; "perhaps double check"    E)  [facility]:  [email or web address]               [phone]    F) [facility]: Contact [facility] :   [phone] [email or web address]      Psyc Meds:   Wellbutrin  mg as mutually agreed over 30 x 3  Lexapro 10 mg once daily #30x 3  Hydroxyzine 50 mg at bed as needed for insomnia    Read package insert of all medication  for further detail / ask psych MD if any questions    References:     Relaxation stress reduction workbook: ROULA Ruiz PhD ( used: $7-10)    Feeling Good Website: Eduardo Vera MD / www.[employer] website (free) / vinny. PODCASTS    Anxiety &  phobia workbook by NOEMI Vega PhD  (web retailers: used: $ 7-10)    VA: Path to Better Sleep : https://www.veterantraining.va.gov/insomnia/ (free)     QUIT WITH [facility] : https://quitwithusla.org/prepare/     [facility] [phone]: (www.[facility].org/StopSmoking ; Quit smoking the Easy Way by Luis Kelsey as well as some motivational concepts of alternative stresss mangement / relaxation skills and peer support      Pt expressed appreciation for the visit today and did not have further question at this time though pt  was still informed to:     Call  if problems.    Call / Report Side Effects to Psyc MD     Encouraged to follow up with primary care / Gen Med MD for continued monitoring of general health and wellness.    Understanding was expressed; and no further concerns " nor questions were raised at this time.     Remember healthy self care:   eat right  attempt adequate rest   HANDWASHING / encourage such vinny. During this corona virus time   walk or light exercise within reason and as your general med team approves  read or explore any of reference materials / homework mentioned  reach out (I.e.,  connect with)  others who nuture and bring out best in you  avoid risky behaviors    Keep your appointments:    IF you  cannot make your appt THEN please call [phone]  or go online (via My Chart isidro) to reschedule.    It is the responsibility of the patient to reschedule an appointment if an appointment has been canceled or missed.    Avoid  alcohol and illicit substances.  Look for the positive.  All is often relative-seek balance  Call sooner if needed : [phone]  Call 911 or go to Emergency Room  (ER)  if  any acute concerns

## 2025-05-01 NOTE — PROGRESS NOTES
Varinder Salazar   1985     05/01/2025     Disclaimer: Evaluation and treatment is based on information presented to date. Any new information may affect assessment and findings.     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)     Oct 22 2024 >> Lost job couple weeks ago /  for prop [employer] /  [place] / says he was last join / first to go //  Feb 5 2025: Landed job from  [employer]    The patient location is: Patient's [facility]       Visit type: Virtual visit with synchronous audio and video     Each patient to whom he or she provides medical services by telemedicine is: (1) informed of the relationship between the physician and patient and the respective role of any other health care provider with respect to management of the patient; and (2) notified that he or she may decline to receive medical services by telemedicine and may withdraw from such care at any time.    Patient was informed that I am a physician who is licensed in the [facility]:  Eduardo Malik MD:  Employed by   [facility]     If technology issues arise: RAYNA Malik MD will attempt to call pt back     Pt informed that if he / she is ever in crisis (or has acute concerns): pt is instructed to Dial 911 or go to nearest Emergency Room (ER)    Pt informed that if questions related to privacy practices: pt is instructed to contact [facility] Information Department:     Understanding Expressed. No questions.     S: Patient's Own Perception of Condition (& Side Effects) :  none     O:      CURRENT PRESENTATION:     Says doing well ; finds medication helpful    Got  late summer 2024; juan vega  / had been  once prior  x 10 yrs to [place] 38y ()  2 boys :  2y old: Navneet // 11y Shiraz     Says keeping busy at Work and daily life    Says was off his medicine for  about a month says it was finance related but he is back on it; says it was an unusual time for him;     Top Stressors    Job is temporary : working as  with [employer]     Custody issue with kids (12y and 2y): only sees 12 y old 1x week; and says has only seen his 2y old a  few times    Wife only wants to give him supervised visitation.  Says she always pains others in the worst anxiety has an      Goal directed    Calm    No SI  no HI    No Psychosis    Mood:  ok     Affect:   appro range    Self Ratings:       5/1/2025    11:29 AM 2/5/2025     1:22 PM 10/22/2024     2:29 PM   GAD7   1. Feeling nervous, anxious, or on edge? 2 1 2   2. Not being able to stop or control worrying? 1 1 2   3. Worrying too much about different things? 2 1 2   4. Trouble relaxing? 1 1 2   5. Being so restless that it is hard to sit still? 1 1 2   6. Becoming easily annoyed or irritable? 1 0 1   7. Feeling afraid as if something awful might happen? 0 0 1   8. If you checked off any problems, how difficult have these problems made it for you to do your work, take care of things at home, or get along with other people? 1 1 1   BECKY-7 Score 8 5 12          5/1/2025    11:30 AM 2/5/2025     1:23 PM 10/22/2024     2:29 PM 7/3/2024     3:25 PM 4/23/2024     7:18 PM 6/1/2021     9:39 AM 12/13/2017    10:59 AM   Depression Patient Health Questionnaire   Over the last two weeks how often have you been bothered by little interest or pleasure in doing things Several days More than half the days Several days Not at all More than half the days Not at all  Not at all    Over the last two weeks how often have you been bothered by feeling down, depressed or hopeless Several days Several days More than half the days Not at all More than half the days Not at all  Not at all    PHQ-2 Total Score 2 3 3 0 4 0 0   Over the last two weeks how often have you been bothered by trouble falling or staying asleep, or sleeping  too much Not at all More than half the days Nearly every day More than half the days Nearly every day     Over the last two weeks how often have you been bothered by feeling tired or having little energy Not at all More than half the days More than half the days Several days More than half the days     Over the last two weeks how often have you been bothered by a poor appetite or overeating Not at all Not at all Several days Not at all Several days     Over the last two weeks how often have you been bothered by feeling bad about yourself - or that you are a failure or have let yourself or your family down Not at all Several days More than half the days Not at all More than half the days     Over the last two weeks how often have you been bothered by trouble concentrating on things, such as reading the newspaper or watching television Several days More than half the days More than half the days More than half the days More than half the days     Over the last two weeks how often have you been bothered by moving or speaking so slowly that other people could have noticed. Or the opposite - being so fidgety or restless that you have been moving around a lot more than usual. Several days Several days Several days Several days Several days     Over the last two weeks how often have you been bothered by thoughts that you would be better off dead, or of hurting yourself Not at all Not at all Not at all Not at all Not at all     If you checked off any problems, how difficult have these problems made it for you to do your work, take care of things at home or get along with other people? Somewhat difficult Somewhat difficult Somewhat difficult Somewhat difficult Somewhat difficult     PHQ-9 Score 4 11 14 6 15     PHQ-9 Interpretation Minimal or None Moderate Moderate Mild Moderately Severe         Data saved with a previous flowsheet row definition     Psyc Medication:  likes such no side effects no med issues    Wellbutrin XL  "300 mg daily  Lexapro 10 mg daily  Hydroxyzine 50 mg at bed as needed / says "really works well" / says 50 mg works better than the 25mg /     Constitutional Health Concerns:      Patient Active Problem List   Diagnosis    Lateral epicondylitis (tennis elbow)    Chronic midline low back pain with bilateral sciatica    Misuse of prescription only drugs    Current moderate episode of major depressive disorder    Anxiety disorder    Dysfunction of eustachian tube    Depressive disorder    Recurrent major depressive disorder, in partial remission    Severe obesity (BMI 35.0-39.9) with comorbidity    Erectile dysfunction    Nicotine dependence, cigarettes, uncomplicated    Divorce / child custody issues still pending April 2024:  (2y old and 11 y old)    Major depressive disorder, recurrent episode, moderate degree    Cannabis use disorder in FULL REMISSION [did SMOKE CANNABIS 21y to 25 or 26y / DAILY 1/2 blunt]        Wt Readings from Last 3 Encounters:   10/22/24 99.2 kg (218 lb 11.1 oz)   04/23/24 93.5 kg (206 lb 2.1 oz)   09/28/23 91 kg (200 lb 9.9 oz)        Laboratory Data  No visits with results within 1 Month(s) from this visit.   Latest known visit with results is:   Lab Visit on 02/28/2023   Component Date Value Ref Range Status    WBC 02/28/2023 10.90  3.90 - 12.70 K/uL Final    RBC 02/28/2023 5.14  4.60 - 6.20 M/uL Final    Hemoglobin 02/28/2023 14.9  14.0 - 18.0 g/dL Final    Hematocrit 02/28/2023 46.1  40.0 - 54.0 % Final    MCV 02/28/2023 90  82 - 98 fL Final    MCH 02/28/2023 29.0  27.0 - 31.0 pg Final    MCHC 02/28/2023 32.3  32.0 - 36.0 g/dL Final    RDW 02/28/2023 13.5  11.5 - 14.5 % Final    Platelets 02/28/2023 334  150 - 450 K/uL Final    MPV 02/28/2023 10.4  9.2 - 12.9 fL Final    Sodium 02/28/2023 141  136 - 145 mmol/L Final    Potassium 02/28/2023 4.0  3.5 - 5.1 mmol/L Final    Chloride 02/28/2023 105  95 - 110 mmol/L Final    CO2 02/28/2023 26  23 - 29 mmol/L Final    Glucose 02/28/2023 81  70 - " 110 mg/dL Final    BUN 02/28/2023 10  6 - 20 mg/dL Final    Creatinine 02/28/2023 1.1  0.5 - 1.4 mg/dL Final    Calcium 02/28/2023 9.7  8.7 - 10.5 mg/dL Final    Total Protein 02/28/2023 7.7  6.0 - 8.4 g/dL Final    Albumin 02/28/2023 4.4  3.5 - 5.2 g/dL Final    Total Bilirubin 02/28/2023 0.5  0.1 - 1.0 mg/dL Final    Alkaline Phosphatase 02/28/2023 61  55 - 135 U/L Final    AST 02/28/2023 24  10 - 40 U/L Final    ALT 02/28/2023 42  10 - 44 U/L Final    Anion Gap 02/28/2023 10  8 - 16 mmol/L Final    eGFR 02/28/2023 >60.0  >60 mL/min/1.73 m^2 Final    Cholesterol 02/28/2023 187  120 - 199 mg/dL Final    Triglycerides 02/28/2023 58  30 - 150 mg/dL Final    HDL 02/28/2023 56  40 - 75 mg/dL Final    LDL Cholesterol 02/28/2023 119.4  63.0 - 159.0 mg/dL Final    HDL/Cholesterol Ratio 02/28/2023 29.9  20.0 - 50.0 % Final    Total Cholesterol/HDL Ratio 02/28/2023 3.3  2.0 - 5.0 Final    Non-HDL Cholesterol 02/28/2023 131  mg/dL Final    TSH 02/28/2023 1.581  0.400 - 4.000 uIU/mL Final    Hemoglobin A1C 02/28/2023 5.5  4.0 - 5.6 % Final    Estimated Avg Glucose 02/28/2023 111  68 - 131 mg/dL Final        Mental Status Exam:   Appearance: casual   Oriented: x 3  Attitude: cooperative pleasant   Eye Contact: good   Behavior: calm here   Mood: ok   Cognition: alert /  Concentration: grossly intact   Affect: appropriate range   Anxiety: mild mod  Thought Process: goal directed   Speech: Volume : WNL   Quantity WNL   Quality: appears to openly answer questions   Eye Contact: good   Threats: no SI / no HI   Memory: intact (as relay information across time spans)   Psychosis: denies all   Impulse Control: no history SI / nor HI ; calm here      3 wishes ?  Financial stability / nothing else     Musculoskeletal: no tremor     Social:  Recently  summer 2024 lady he has known since 7th grade      Patient Active Problem List   Diagnosis    Lateral epicondylitis (tennis elbow)    Chronic midline low back pain with bilateral  sciatica    Misuse of prescription only drugs    Current moderate episode of major depressive disorder    Anxiety disorder    Dysfunction of eustachian tube    Depressive disorder    Recurrent major depressive disorder, in partial remission    Severe obesity (BMI 35.0-39.9) with comorbidity    Erectile dysfunction    Nicotine dependence, cigarettes, uncomplicated    Divorce / child custody issues still pending April 2024:  (2y old and 11 y old)    Major depressive disorder, recurrent episode, moderate degree    Cannabis use disorder in FULL REMISSION [did SMOKE CANNABIS 21y to 25 or 26y / DAILY 1/2 blunt]          Current Outpatient Medications:     buPROPion (WELLBUTRIN XL) 300 MG 24 hr tablet, Take 1 tablet (300 mg total) by mouth once daily., Disp: 30 tablet, Rfl: 3    EScitalopram oxalate (LEXAPRO) 10 MG tablet, Take 1 tablet (10 mg total) by mouth once daily., Disp: 30 tablet, Rfl: 3    hydrOXYzine (ATARAX) 50 MG tablet, Take 1 tablet (50 mg total) by mouth nightly as needed (INSOMNIA)., Disp: 30 tablet, Rfl: 3    multivitamin capsule, Take 1 capsule by mouth once daily., Disp: , Rfl:     tadalafiL (CIALIS) 20 MG Tab, Take 1 tablet (20 mg total) by mouth once daily., Disp: 30 tablet, Rfl: 11     Social History     Tobacco Use   Smoking Status Former    Types: Cigarettes   Smokeless Tobacco Never        Review of patient's allergies indicates:  No Known Allergies       Psychotherapy:  Target symptoms: depression, anxiety , new job, recently , insomnia   Why chosen therapy is appropriate versus another modality: relevant to diagnosis  Outcome monitoring methods: self-report, observation, checklist/rating scale  Therapeutic intervention type: insight oriented psychotherapy, supportive psychotherapy  Topics discussed/themes:  see Target symptoms  The patient's response to the intervention is accepting. The patient's progress toward treatment goals is good.   Duration of intervention: 16 minutes.  "    ASSESSMENT:   Encounter Diagnoses   Name Primary?    Major depressive disorder, recurrent episode, mild (historically: moderate ) Yes    Anxiety disorder, unspecified type     Divorce / child custody issues still pending April 2024:  (2y old and 11 y old)     Insomnia, unspecified type     Nicotine dependence, cigarettes, uncomplicated          Patient Instructions     PLAN:     Follow UP Aug 21 2025 @ 3:30pm TELEHEALTH     2) If intersted in  Individual Counseling:     A) Call [facility]              [phone] (there may be significant wait times)     B) Contact   your  insurance carrier:  via their Website or call and ask for assistance    C) Psychology Today: www.psychologyMarcadia Biotech.[employer]/us/therapists     D) [facility] ([facility]) :   [phone] [email or web address]      (website shows accepts "walk ins"; "perhaps double check"    E)  [facility]:  [email or web address]               [phone]    F) [facility]: Contact [facility] :   [phone] [email or web address]      Psyc Meds:   Wellbutrin  mg as mutually agreed over 30 x 3  Lexapro 10 mg once daily #30x 3  Hydroxyzine 50 mg at bed as needed for insomnia    Read package insert of all medication  for further detail / ask psych MD if any questions    References:     Relaxation stress reduction workbook: ROULA Ruiz PhD ( used: $7-10)    Feeling Good Website: Eduardo Vera MD / www.[employer] website (free) / vinny. PODCASTS    Anxiety &  phobia workbook by NOEMI Vega PhD  (web retailers: used: $ 7-10)    VA: Path to Better Sleep : https://www.veterantraining.va.gov/insomnia/ (free)     QUIT WITH [facility] : https://quitwithusla.org/prepare/     [facility] [phone]: (www.[facility].org/StopSmoking ; Quit smoking the Easy Way by Luis Kelsey as well as some motivational concepts of alternative stresss mangement / relaxation skills and peer " support      Pt expressed appreciation for the visit today and did not have further question at this time though pt  was still informed to:     Call  if problems.    Call / Report Side Effects to Psyc MD     Encouraged to follow up with primary care / Gen Med MD for continued monitoring of general health and wellness.    Understanding was expressed; and no further concerns nor questions were raised at this time.     Remember healthy self care:   eat right  attempt adequate rest   HANDWASHING / encourage such vinny. During this corona virus time   walk or light exercise within reason and as your general med team approves  read or explore any of reference materials / homework mentioned  reach out (I.e.,  connect with)  others who nuture and bring out best in you  avoid risky behaviors    Keep your appointments:    IF you  cannot make your appt THEN please call [phone]  or go online (via My Chart isidro) to reschedule.    It is the responsibility of the patient to reschedule an appointment if an appointment has been canceled or missed.    Avoid  alcohol and illicit substances.  Look for the positive.  All is often relative-seek balance  Call sooner if needed : [phone]  Call 911 or go to Emergency Room  (ER)  if  any acute concerns     >> Excerpt from unique eval 4-  <<    Varinder Clark Salazar initially presented as a 38 y.o.  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     College grad and masters degree   Has a background in business administration and finance    He is pleasant goal-directed calm; says he is between jobs.    Has some job opportunities he is looking at    Mom was especially ; dad oversaw [employer] [facility]    He has had 1 psych hospitalization past; around the time of some marital stress.    He has 2 children (2 years old and 11 years old).  He has in a custody coppola with ex-wife.  Wife only wants to give him supervised visitation.  Says she always pains others in the worst  anxiety has an     Excerpt of  [facility] note  from 8-8-23 :  Pt returned in person casually dressed and neatly groomed.  He reported that his mood has improved noticeably since restarting Lexapro 10 mg daily.  He is less anxious and upset over life events, without returning to the amotivational state which he experienced before at 20 mg.  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  He has regular custody of his children and he is dating again.  Unfortunately in [facility], the domestic violence charge which he says is false can never be expunged.     Medication was reviewed.  Pt reported he still has some sadness and decreased energy.  He is also experiencing sexual dysfunction on Lexapro.  Changing timing of the the dose has not helped.  He benefited from Wellbutrin in addition to Lexapro in the past and would like to try that again.  Seizure risk was discussed again and 150 mg of XR was prescribed daily.  He was asked to return in person in 3 months.  My assisted in 3 months was explained.  He agreed to schedule now f/u with a prescribing provider in this clinic in 4 months.     >>  Past Psychiatric History:   Previous therapy: Yes and in past; helpful therapist left  Previous psychiatric hospitalizations: Yes and [facility] job marital stress   Previous diagnoses:  depression and anxiety  Previous suicide attempts: No     Abuse History:   Physical Abuse: No  Sexual Abuse: No  Other Abuse / Trauma : No     Substance Abuse History:  Use of alcohol:  social occasion 1 day week/ no control issue  Tobacco use:  1 pack last 4 day to 1 week / started 22y   Cannabis:  gummies occasion at night // did SMOKE CANNABIS 21y to 25 or 26y / DAILY 1/2 blunt  Recreational drugs:  denies     Family History Mental Health:   Suicide :  No  Other:   mom depression / aunt bipolar       Weapons: No     Psyc Meds:   Lexapro 10mg (says helps  "tho some sexual side effect tho Cialis has resolved)   Wellbutrin  interest to go 300 mg     Top 3 Stressors:  child custody issues / legal issues / ex wife wanting supervised visits //  kids 11y and 2 y // financial stability //   [place] Born:  [place]     Siblings (full)  Brothers: one 6y older Edson / patent officer // [place] [facility] // has girl boy  Sisters: one 10y older  [employer] benefit admin [facility] [employer] // [facility] she 2 girls   Pt is youngest      Parents : dad  complications covid 76y  // mom alive 73  Dorie      Briefly Describe  your Mom: supportive /  present   Briefly Describe your Dad: supportive present / "geat relationship with parents     Bio Mom: Occupation: spec  [employer] / [place] [employer]   Bio Dad:  Occupation:  over [employer] (keyboard [employer] violin )     Marital Status: once x 10 yrs / Tremida 38y ()      Children   Girls  (ages): none  Boys (ages):  2y old: Navneet // 11y Shiraz      Education: [employer] /  [employer] bachelor:  [employer] admin and finance (2012)      [employer]: Worship      Legal Issues? Custody kids   DWI ?n     Ever homeless? 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 4-5 yrs   [employer] [place]         "

## 2025-08-21 ENCOUNTER — OFFICE VISIT (OUTPATIENT)
Dept: PSYCHIATRY | Facility: CLINIC | Age: 40
End: 2025-08-21

## 2025-08-21 DIAGNOSIS — Z56.0 LOSS OF JOB: ICD-10-CM

## 2025-08-21 DIAGNOSIS — F17.210 NICOTINE DEPENDENCE, CIGARETTES, UNCOMPLICATED: ICD-10-CM

## 2025-08-21 DIAGNOSIS — F33.1 MAJOR DEPRESSIVE DISORDER, RECURRENT EPISODE, MODERATE DEGREE: Primary | ICD-10-CM

## 2025-08-21 DIAGNOSIS — G47.00 INSOMNIA, UNSPECIFIED TYPE: ICD-10-CM

## 2025-08-21 DIAGNOSIS — F41.9 ANXIETY DISORDER, UNSPECIFIED TYPE: ICD-10-CM

## 2025-08-21 DIAGNOSIS — F12.91 CANNABIS USE DISORDER IN REMISSION: ICD-10-CM

## 2025-08-21 DIAGNOSIS — Z63.5 DIVORCE: ICD-10-CM

## 2025-08-21 RX ORDER — ESCITALOPRAM OXALATE 10 MG/1
10 TABLET ORAL DAILY
Qty: 30 TABLET | Refills: 3 | Status: SHIPPED | OUTPATIENT
Start: 2025-08-21 | End: 2025-12-19

## 2025-08-21 RX ORDER — BUPROPION HYDROCHLORIDE 300 MG/1
300 TABLET ORAL DAILY
Qty: 30 TABLET | Refills: 3 | Status: SHIPPED | OUTPATIENT
Start: 2025-08-21 | End: 2025-12-19

## 2025-08-21 RX ORDER — HYDROXYZINE HYDROCHLORIDE 50 MG/1
50-75 TABLET, FILM COATED ORAL NIGHTLY PRN
Qty: 45 TABLET | Refills: 3 | Status: SHIPPED | OUTPATIENT
Start: 2025-08-21 | End: 2025-12-19

## 2025-08-21 SDOH — SOCIAL DETERMINANTS OF HEALTH (SDOH): UNEMPLOYMENT, UNSPECIFIED: Z56.0

## 2025-08-21 SDOH — SOCIAL DETERMINANTS OF HEALTH (SDOH): DISRUPTION OF FAMILY BY SEPARATION AND DIVORCE: Z63.5

## (undated) DEVICE — MARKER SKIN STND TIP BLUE BARR

## (undated) DEVICE — DRAPE COLUMN DAVINCI XI

## (undated) DEVICE — DRAPE ABDOMINAL TIBURON 14X11

## (undated) DEVICE — DRAPE STERI INSTRUMENT 1018

## (undated) DEVICE — BLADE SURG CARBON STEEL SZ11

## (undated) DEVICE — ADHESIVE DERMABOND ADVANCED

## (undated) DEVICE — COVER TIP CURVED SCISSORS XI

## (undated) DEVICE — COVER LIGHT HANDLE

## (undated) DEVICE — KIT ANTIFOG

## (undated) DEVICE — SOL ELECTROLUBE ANTI-STIC

## (undated) DEVICE — SUT VLOC 90 3-0 V-20 NDL 9

## (undated) DEVICE — DRAPE SCOPE PILLOW WARMER

## (undated) DEVICE — TRAY MINOR GEN SURG

## (undated) DEVICE — NDL HYPO REG 25G X 1 1/2

## (undated) DEVICE — SUT 0 VICRYL / UR6 (J603)

## (undated) DEVICE — DRAPE ARM DAVINCI XI

## (undated) DEVICE — SEE MEDLINE ITEM 157117

## (undated) DEVICE — TUBE SET SINGLE LUMEN FILTERED

## (undated) DEVICE — OBTURATOR BLADELESS 8MM XI CLR

## (undated) DEVICE — GLOVE GAMMEX SURG LF PI SZ 7.5

## (undated) DEVICE — SUT MCRYL PLUS 4-0 PS2 27IN

## (undated) DEVICE — ELECTRODE REM PLYHSV RETURN 9

## (undated) DEVICE — SEE MEDLINE ITEM 152622

## (undated) DEVICE — SEAL UNIVERSAL 5MM-8MM XI

## (undated) DEVICE — TROCAR ENDOPATH XCEL 5X100MM